# Patient Record
Sex: FEMALE | Race: WHITE | NOT HISPANIC OR LATINO | Employment: OTHER | URBAN - METROPOLITAN AREA
[De-identification: names, ages, dates, MRNs, and addresses within clinical notes are randomized per-mention and may not be internally consistent; named-entity substitution may affect disease eponyms.]

---

## 2017-01-18 ENCOUNTER — OFFICE VISIT (OUTPATIENT)
Dept: OPHTHALMOLOGY | Facility: CLINIC | Age: 67
End: 2017-01-18
Payer: COMMERCIAL

## 2017-01-18 DIAGNOSIS — Z96.1 PSEUDOPHAKIA OF BOTH EYES: ICD-10-CM

## 2017-01-18 DIAGNOSIS — D31.32 CHOROIDAL NEVUS, LEFT: ICD-10-CM

## 2017-01-18 DIAGNOSIS — H52.4 PRESBYOPIA: Primary | ICD-10-CM

## 2017-01-18 DIAGNOSIS — M35.00 SJOGREN'S SYNDROME (H): ICD-10-CM

## 2017-01-18 PROCEDURE — 92250 FUNDUS PHOTOGRAPHY W/I&R: CPT | Performed by: STUDENT IN AN ORGANIZED HEALTH CARE EDUCATION/TRAINING PROGRAM

## 2017-01-18 PROCEDURE — 92014 COMPRE OPH EXAM EST PT 1/>: CPT | Performed by: STUDENT IN AN ORGANIZED HEALTH CARE EDUCATION/TRAINING PROGRAM

## 2017-01-18 PROCEDURE — 92015 DETERMINE REFRACTIVE STATE: CPT | Performed by: STUDENT IN AN ORGANIZED HEALTH CARE EDUCATION/TRAINING PROGRAM

## 2017-01-18 ASSESSMENT — REFRACTION_MANIFEST
OS_ADD: +2.50
OD_SPHERE: -0.25
OD_ADD: +2.50
OS_CYLINDER: +1.00
OS_SPHERE: -0.50
OS_AXIS: 170
OD_CYLINDER: SPHERE

## 2017-01-18 ASSESSMENT — CONF VISUAL FIELD
OS_NORMAL: 1
OD_NORMAL: 1

## 2017-01-18 ASSESSMENT — EXTERNAL EXAM - RIGHT EYE: OD_EXAM: NORMAL

## 2017-01-18 ASSESSMENT — SLIT LAMP EXAM - LIDS
COMMENTS: NORMAL
COMMENTS: NORMAL

## 2017-01-18 ASSESSMENT — TONOMETRY
OS_IOP_MMHG: 14
IOP_METHOD: APPLANATION
OD_IOP_MMHG: 15

## 2017-01-18 ASSESSMENT — VISUAL ACUITY
OD_SC: 20/20
METHOD: SNELLEN - LINEAR
OS_SC: 20/25
OD_SC+: -2

## 2017-01-18 ASSESSMENT — EXTERNAL EXAM - LEFT EYE: OS_EXAM: NORMAL

## 2017-01-18 ASSESSMENT — CUP TO DISC RATIO
OD_RATIO: 0.25
OS_RATIO: 0.2

## 2017-01-18 NOTE — PATIENT INSTRUCTIONS
"Use artificial tears up to 4 times per day (Refresh Plus, Systane Balance, or generic artificial tears are ok. Avoid \"get the red out\" drops).   Continue Zyrtec as needed   May continue over the counter reader  Glasses prescription given - optional     Tiffanie Leroy MD  [phone]    "

## 2017-01-18 NOTE — MR AVS SNAPSHOT
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 Avoid \"get the red out\" drops).   Continue Zyrtec as needed   May continue over the counter reader  Glasses prescription given - optional     Tiffanie Leroy MD  [phone]          Follow-ups after your visit        Follow-up notes from your care team     Return in about 1 year (around 1/18/2018) for Complete Exam.      Who to contact     If you have questions or need follow up information about today's clinic visit or your schedule please contact [facility] directly at [phone].  Normal or non-critical lab and imaging results will be communicated to you by [employer], letter or phone within 4 business days after the clinic has received the results. If you do not hear from us within 7 days, please contact the clinic through [employer] or phone. If you have a critical or abnormal lab result, we will notify you by phone as soon as possible.  Submit refill requests through "[employer] or call your pharmacy and they will forward the refill request to us. Please allow 3 business days for your refill to be completed.          Additional Information About Your Visit        MyChart Information     "[employer] gives you secure access to your electronic health record. If you see a primary care provider, you can also send messages to your care team and make appointments. If you have questions, please call your primary care clinic.  If you do not have a primary care " provider, please call [phone] and they will assist you.        Care EveryWhere ID     This is your Care EveryWhere ID. This could be used by other organizations to access your [place] medical records  [id number]         Blood Pressure from Last 3 Encounters:   08/27/16 129/67   07/19/16 129/78   03/23/16 125/72    Weight from Last 3 Encounters:   08/27/16 93.26 kg (205 lb 9.6 oz)   07/19/16 92.987 kg (205 lb)   03/23/16 94.348 kg (208 lb)              Today, you had the following     No orders found for display       Primary Care Provider Office Phone # Fax #    Josee Carpenter [phone]       [facility] [address] [facility] [address]        Thank you!     Thank you for choosing [facility]  for your care. Our goal is always to provide you with excellent care. Hearing back from our patients is one way we can continue to improve our services. Please take a few minutes to complete the written survey that you may receive in the mail after your visit with us. Thank you!             Your Updated Medication List - Protect others around you: Learn how to safely use, store and throw away your medicines at www.disposemymeds.org.          This list is accurate as of: 1/18/17  3:08 PM.  Always use your most recent med list.                   Brand Name Dispense Instructions for use    ascorbic acid 500 MG tablet    VITAMIN C     1 TABLET  DAILY       BL VITAMIN B COMPLEX Tabs      sublingual       CALCIUM 600 + D PO      Take by mouth daily       carboxymethylcellulose 1 % ophthalmic solution    CELLUVISC/REFRESH LIQUIGEL     Place 1 drop into both eyes 3 times daily       cetirizine 10 MG tablet    zyrTEC     Take 10 mg by mouth daily       clobetasol 0.05 % cream    TEMOVATE     Apply topically 2 times daily       diclofenac 1 % Gel topical gel    VOLTAREN    100 g    Place onto the skin 4 times daily       fluticasone 50 MCG/ACT spray    FLONASE     Spray 2  sprays into both nostrils daily       OSTEO BI-FLEX ADV DOUBLE ST Caps      1 tablet daily       SUDAFED 30 MG tablet   Generic drug:  pseudoePHEDrine      1 TABLET EVERY 4 TO 6 HOURS AS NEEDED       TYLENOL PO      Take 325 mg by mouth       umeclidinium 62.5 MCG/INH oral inhaler    INCRUSE ELLIPTA    30 each    Inhale 1 puff (62.5 mcg) into the lungs daily       vitamin E 400 UNIT capsule      1 CAPSULE DAILY

## 2017-01-18 NOTE — PROGRESS NOTES
" Current Eye Medications:  Art. Tears TID both eyes.      Subjective:  Here for complete.  Has the marker's for Sjogren's, [facility] Does use drops every day, vision has been good.  Even reads without glasses sometimes. Zyrtec helps with the red right eye flare ups. Nevus left eye, will be photographed today.      Objective:  See Ophthalmology Exam.      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      Choroidal nevus, left Stable. Photos obtained today.       Plan:  Use artificial tears up to 4 times per day (Refresh Plus, Systane Balance, or generic artificial tears are ok. Avoid \"get the red out\" drops).   Continue Zyrtec as needed   May continue over the counter reader    Tiffanie Leroy MD  [phone]        "

## 2017-01-26 ENCOUNTER — OFFICE VISIT (OUTPATIENT)
Dept: URGENT CARE | Facility: URGENT CARE | Age: 67
End: 2017-01-26
Payer: COMMERCIAL

## 2017-01-26 VITALS
WEIGHT: 196 LBS | RESPIRATION RATE: 16 BRPM | DIASTOLIC BLOOD PRESSURE: 71 MMHG | SYSTOLIC BLOOD PRESSURE: 117 MMHG | TEMPERATURE: 99.3 F | BODY MASS INDEX: 31.16 KG/M2 | HEART RATE: 81 BPM | OXYGEN SATURATION: 97 %

## 2017-01-26 DIAGNOSIS — J01.40 ACUTE NON-RECURRENT PANSINUSITIS: Primary | ICD-10-CM

## 2017-01-26 PROCEDURE — 99214 OFFICE O/P EST MOD 30 MIN: CPT | Performed by: PHYSICIAN ASSISTANT

## 2017-01-26 NOTE — PROGRESS NOTES
SUBJECTIVE:                                                    Karis Youssef is a 66 year old female who presents to clinic today for the following health issues:      RESPIRATORY SYMPTOMS      Duration: 3 days    Description  nasal congestion, sore throat, facial pain/pressure, cough, wheezing and chills    Severity: moderate    Accompanying signs and symptoms: None    History (predisposing factors):  COPD ?    Precipitating or alleviating factors: None    Therapies tried and outcome:  oral decongestant     Grayson Youssef is a 65 yo female who presents with possible sinus infection for last 4 days.  She notes frontal sinus pressure.  She does have a cough at night, not during the day as much.  There is a sore throat, that is right sided and worse in the morning.  She has had a fever, states on Monday she had body aches, headache, chills and a temperature up to 104.  Low grade fever has persisted since Monday.  She did have some wheezing last night when she was lying down.  She has used an oral decongestant with some relief.  She has a history of chronic sinusitis as well as pneumonia-last year.  She has been around sick contacts.  No shortness of breath, vomiting, or diarrhea.  No hemoptysis.      Problem list and histories reviewed & adjusted, as indicated.  Additional history: as documented    Current Outpatient Prescriptions   Medication Sig Dispense Refill     carboxymethylcellulose (CELLUVISC/REFRESH LIQUIGEL) 1 % ophthalmic solution Place 1 drop into both eyes 3 times daily       fluticasone (FLONASE) 50 MCG/ACT nasal spray Spray 2 sprays into both nostrils daily       umeclidinium (INCRUSE ELLIPTA) 62.5 MCG/INH oral inhaler Inhale 1 puff (62.5 mcg) into the lungs daily 30 each 1     diclofenac (VOLTAREN) 1 % GEL Place onto the skin 4 times daily 100 g 6     Acetaminophen (TYLENOL PO) Take 325 mg by mouth       cetirizine (ZYRTEC) 10 MG tablet Take 10 mg by mouth daily       clobetasol (TEMOVATE) 0.05 %  cream Apply topically 2 times daily       Calcium Carbonate-Vitamin D (CALCIUM 600 + D OR) Take by mouth daily       OSTEO BI-FLEX ADV DOUBLE ST PO CAPS 1 tablet daily       VITAMIN E 400 UNIT PO CAPS 1 CAPSULE DAILY       BL VITAMIN B COMPLEX PO TABS sublingual       VITAMIN C 500 MG OR TABS 1 TABLET  DAILY       SUDAFED 30 MG OR TABS 1 TABLET EVERY 4 TO 6 HOURS AS NEEDED       Allergies   Allergen Reactions     Erythromycin Nausea     Tongue turned black.       ROS:  As per HPI    OBJECTIVE:                                                    /71 mmHg  Pulse 81  Temp(Src) 99.3  F (37.4  C) (Oral)  Resp 16  Wt 196 lb (88.905 kg)  SpO2 97%  Body mass index is 31.16 kg/(m^2).  GENERAL: healthy, alert and no distress  HENT: normal cephalic/atraumatic, ear canals and TM's normal, nose and mouth without ulcers or lesions, oropharynx clear and oral mucous membranes moist.  Bilateral inferior turbinates are mildly edematous.  There is frontal and maxillary sinus tenderness bilaterally.  Thick drainage is noted in the OP.  NECK: no adenopathy  RESP: lungs clear to auscultation - no rales, rhonchi or wheezes  CV: regular rate and rhythm, normal S1 S2  SKIN: no suspicious lesions or rashes    Diagnostic Test Results:  none      ASSESSMENT/PLAN:                                                    (J01.40) Acute non-recurrent pansinusitis  (primary encounter diagnosis)      Findings consistent with sinus infection.  With her history of chronic sinusitis and low grade fever in clinic I would like to treat and she was in agreement.  Start Augmentin 875 mg one tablet twice daily for 10 days.  Take with food and add a probiotic.  Continue with nasal saline rinses and a decongestant of choice.  Steamy showers.  Follow up with primary care to assure resolution of symptoms.  All questions were answered and she was in agreement with above plan.      Mignon Stanford PA-C  [facility]

## 2017-01-27 NOTE — NURSING NOTE
"Chief Complaint   Patient presents with     Sinus Problem     c/o sinus pressure and pain, congestion and fever x 3 days       Initial /71 mmHg  Pulse 81  Temp(Src) 99.3  F (37.4  C) (Oral)  Resp 16  Wt 196 lb (88.905 kg)  SpO2 97% Estimated body mass index is 31.16 kg/(m^2) as calculated from the following:    Height as of 7/19/16: 5' 6.5\" (1.689 m).    Weight as of this encounter: 196 lb (88.905 kg).  BP completed using cuff size: uyen Jenkins MA      "

## 2017-05-02 ENCOUNTER — TELEPHONE (OUTPATIENT)
Dept: FAMILY MEDICINE | Facility: CLINIC | Age: 67
End: 2017-05-02

## 2017-05-02 NOTE — TELEPHONE ENCOUNTER
Panel Management Review      Patient has the following on her problem list: None      Composite cancer screening  Chart review shows that this patient is due/due soon for the following Pap Smear  Summary:    Patient is due/failing the following:   PAP    Action needed:   Patient needs office visit for AFE .    Type of outreach:    Sent [employer] message.    Questions for provider review:    None                                                                                                                                    Lucy Spangler [facility]       Chart routed to Care Team .

## 2017-05-26 ENCOUNTER — HEALTH MAINTENANCE LETTER (OUTPATIENT)
Age: 67
End: 2017-05-26

## 2017-07-21 ENCOUNTER — OFFICE VISIT (OUTPATIENT)
Dept: URGENT CARE | Facility: URGENT CARE | Age: 67
End: 2017-07-21
Payer: COMMERCIAL

## 2017-07-21 ENCOUNTER — RADIANT APPOINTMENT (OUTPATIENT)
Dept: GENERAL RADIOLOGY | Facility: CLINIC | Age: 67
End: 2017-07-21
Attending: FAMILY MEDICINE
Payer: COMMERCIAL

## 2017-07-21 VITALS
WEIGHT: 203 LBS | OXYGEN SATURATION: 98 % | HEART RATE: 74 BPM | TEMPERATURE: 97.5 F | DIASTOLIC BLOOD PRESSURE: 75 MMHG | SYSTOLIC BLOOD PRESSURE: 127 MMHG | BODY MASS INDEX: 32.27 KG/M2

## 2017-07-21 DIAGNOSIS — R07.89 CHEST WALL PAIN: Primary | ICD-10-CM

## 2017-07-21 DIAGNOSIS — R07.89 CHEST WALL PAIN: ICD-10-CM

## 2017-07-21 DIAGNOSIS — S23.8XXA SPRAIN OF CHEST WALL, INITIAL ENCOUNTER: ICD-10-CM

## 2017-07-21 PROCEDURE — 71101 X-RAY EXAM UNILAT RIBS/CHEST: CPT | Mod: RT

## 2017-07-21 PROCEDURE — 99213 OFFICE O/P EST LOW 20 MIN: CPT | Performed by: FAMILY MEDICINE

## 2017-07-21 NOTE — MR AVS SNAPSHOT
After Visit Summary   7/21/2017    Karis Youssef    MRN: [id number]           Patient Information     Date Of Birth          1950        Visit Information        Provider Department      7/21/2017 5:30 PM Johnny Lanier MD [facility]        Today's Diagnoses     Chest wall pain    -  1    Sprain of chest wall, initial encounter          Care Instructions    Continue resting, icing, biofreeze and OTC analgesia.  Avoid lifting heavy weight (5 pound or more).   Follow up if symptoms persist or worsen.             Follow-ups after your visit        Who to contact     If you have questions or need follow up information about today's clinic visit or your schedule please contact [facility] [place] directly at [phone].  Normal or non-critical lab and imaging results will be communicated to you by [employer], letter or phone within 4 business days after the clinic has received the results. If you do not hear from us within 7 days, please contact the clinic through [employer] or phone. If you have a critical or abnormal lab result, we will notify you by phone as soon as possible.  Submit refill requests through [employer] or call your pharmacy and they will forward the refill request to us. Please allow 3 business days for your refill to be completed.          Additional Information About Your Visit        MyChart Information     [employer] gives you secure access to your electronic health record. If you see a primary care provider, you can also send messages to your care team and make appointments. If you have questions, please call your primary care clinic.  If you do not have a primary care provider, please call [phone] and they will assist you.        Care EveryWhere ID     This is your Care EveryWhere ID. This could be used by other organizations to access your [place] medical records  [id number]        Your Vitals Were     Pulse Temperature Pulse Oximetry BMI (Body Mass Index)           74 97.5  F (36.4  C) (Oral) 98% 32.27 kg/m2         Blood Pressure from Last 3 Encounters:   07/21/17 127/75   01/26/17 117/71   08/27/16 129/67    Weight from Last 3 Encounters:   07/21/17 203 lb (92.1 kg)   01/26/17 196 lb (88.9 kg)   08/27/16 205 lb 9.6 oz (93.3 kg)               Primary Care Provider Office Phone # Fax #    Josee Dori Carpenter [phone]       [facility] [address] [facility] [address]        Equal Access to Services     ADELINA DUQUE : Hadii aad ku hadasho Soshola, waaxda luqadaha, qaybta kaalmada adeyrisyada, otto patel. So [facility] [phone].    ATENCIÓN: Si habla español, tiene a lewis disposición servicios gratuitos de asistencia lingüística. Llame al [phone].    We comply with applicable federal civil rights laws and [place] laws. We do not discriminate on the basis of race, color, national origin, age, disability sex, sexual orientation or gender identity.            Thank you!     Thank you for choosing [facility]  for your care. Our goal is always to provide you with excellent care. Hearing back from our patients is one way we can continue to improve our services. Please take a few minutes to complete the written survey that you may receive in the mail after your visit with us. Thank you!             Your Updated Medication List - Protect others around you: Learn how to safely use, store and throw away your medicines at www.disposemymeds.org.          This list is accurate as of: 7/21/17  6:21 PM.  Always use your most recent med list.                   Brand Name Dispense Instructions for use Diagnosis    ascorbic acid 500 MG tablet    VITAMIN C     1 TABLET  DAILY    Sicca (H)       BL VITAMIN B COMPLEX Tabs      sublingual    Sicca (H)       CALCIUM 600 + D PO      Take by mouth daily        carboxymethylcellulose 1 % ophthalmic solution    CELLUVISC/REFRESH LIQUIGEL     Place 1 drop into  both eyes 3 times daily        cetirizine 10 MG tablet    zyrTEC     Take 10 mg by mouth daily        clobetasol 0.05 % cream    TEMOVATE     Apply topically 2 times daily        diclofenac 1 % Gel topical gel    VOLTAREN    100 g    Place onto the skin 4 times daily    Primary osteoarthritis of both knees       fluticasone 50 MCG/ACT spray    FLONASE     Spray 2 sprays into both nostrils daily        OSTEO BI-FLEX ADV DOUBLE ST Caps      1 tablet daily        SUDAFED 30 MG tablet   Generic drug:  pseudoePHEDrine      1 TABLET EVERY 4 TO 6 HOURS AS NEEDED    Sicca (H)       TYLENOL PO      Take 325 mg by mouth        vitamin E 400 UNIT capsule      1 CAPSULE DAILY    Sicca (H)

## 2017-07-21 NOTE — PROGRESS NOTES
SUBJECTIVE:                                                    Karis Youssef is a 67 year old female who presents to clinic today for the following health issues:      Musculoskeletal problem/pain      Duration: X 5 days    Description  Location: right side rib    Intensity:  moderate    Accompanying signs and symptoms: radiation of pain across under breast    History  Previous similar problem: no   Previous evaluation:  none    Precipitating or alleviating factors:  Trauma or overuse: YES- using chainsaw before it started, lifted a humidifier from floor to counter top (weight 40-50 pounds) as well    Aggravating factors include: movements, laying down    Therapies tried and outcome: Ibuprofen        Problem list and histories reviewed & adjusted, as indicated.  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 Mother      OSTEOPOROSIS Mother      Hypertension Mother      HEART DISEASE Mother      CAD, CHF     DIABETES Father      Hypertension Father      CANCER Brother      liver     DIABETES Brother      DIABETES Sister      Breast Cancer Sister 50     Hypertension Sister      CANCER Sister 72      non-hodgkins lymphoma     Connective Tissue Disorder Other      2 cousins and niece with MS; aunt with lupus     Rheumatoid Arthritis Cousin          Current Outpatient Prescriptions   Medication Sig Dispense Refill     Acetaminophen (TYLENOL PO) Take 325 mg by mouth       cetirizine (ZYRTEC) 10 MG tablet Take 10 mg by mouth daily       Calcium Carbonate-Vitamin D (CALCIUM 600 + D OR) Take by mouth daily       VITAMIN E 400 UNIT PO CAPS 1 CAPSULE DAILY       VITAMIN C 500 MG OR TABS 1 TABLET  DAILY       SUDAFED 30 MG OR TABS 1 TABLET EVERY 4 TO 6 HOURS AS NEEDED       carboxymethylcellulose (CELLUVISC/REFRESH LIQUIGEL) 1 % ophthalmic solution Place 1 drop into both eyes 3 times daily       fluticasone (FLONASE) 50 MCG/ACT nasal spray Spray 2 sprays into both nostrils daily       diclofenac (VOLTAREN) 1 % GEL Place onto the skin 4 times daily (Patient not taking: Reported on 7/21/2017) 100 g 6     clobetasol (TEMOVATE) 0.05 % cream Apply topically 2 times daily       OSTEO BI-FLEX ADV DOUBLE ST PO CAPS 1 tablet daily       BL VITAMIN B COMPLEX PO TABS sublingual       Allergies   Allergen Reactions     Erythromycin Nausea     Tongue turned black.     Recent Labs   Lab Test  05/28/15   0831  08/07/13   1232  08/18/10   1035  05/27/09   1037   LDL  114   --   127   --    HDL  67   --   66   --    TRIG  78   --   83   --    ALT   --    --   22   --    CR   --    --   0.79   --    GFRESTIMATED   --    --   75   --    GFRESTBLACK   --    --   >90   --    POTASSIUM   --    --   4.1   --    TSH   --   2.52   --   3.30      BP Readings from Last 3 Encounters:   07/21/17 127/75   01/26/17 117/71   08/27/16 129/67    Wt Readings from Last 3 Encounters:   07/21/17 203 lb (92.1 kg)   01/26/17 196 lb (88.9 kg)   08/27/16 205 lb 9.6 oz (93.3 kg)                  Labs reviewed in EPIC          ROS:  Constitutional, HEENT, cardiovascular, pulmonary, gi and gu systems are negative, except as otherwise noted.      OBJECTIVE:   BP  127/75  Pulse 74  Temp 97.5  F (36.4  C) (Oral)  Wt 203 lb (92.1 kg)  SpO2 98%  BMI 32.27 kg/m2  Body mass index is 32.27 kg/(m^2).  GENERAL: alert and no distress  EYES: Eyes grossly normal to inspection, PERRL and conjunctivae and sclerae normal  NECK: no adenopathy, no asymmetry, masses, or scars and thyroid normal to palpation  RESP: lungs clear to auscultation - no rales, rhonchi or wheezes  CV: right lower chest wall tenderness, no bruising or swelling noted, pain reproducible with movement, HR normal, normal S1 and S2,   ABDOMEN: soft, nontender, no hepatosplenomegaly, no masses and bowel sounds normal  MS: no gross musculoskeletal defects noted, no edema      XR RIBS & CHEST RT 3VW 7/21/2017 6:13 PM      HISTORY: right chest wall pain, Other chest pain     COMPARISON: 8/25/2016         IMPRESSION: Cardiac size is at the upper limits of normal. Thoracic  aorta is tortuous. The pulmonary vasculature is not distended. No  evidence of pneumothorax. There are no acute infiltrates. No displaced  fractures are identified on rib detail views.      ASSESSMENT/PLAN:         ICD-10-CM    1. Chest wall pain R07.89 XR Ribs & Chest Right G/E 3 Views   2. Sprain of chest wall, initial encounter S23.8XXA      Symptoms likely secondary to chest wall sprain. X-ray findings discussed. Written instructions provided. All questions answered.       Patient Instructions   Continue resting, icing and OTC analgesia.  Avoid lifting heavy weight (5 pound or more).   Follow up if symptoms persist or worsen.         Johnny Lanier MD  [facility]

## 2017-07-21 NOTE — NURSING NOTE
"Chief Complaint   Patient presents with     Rib Pain     right side, related to movement       Initial /75  Pulse 74  Temp 97.5  F (36.4  C) (Oral)  Wt 203 lb (92.1 kg)  SpO2 98%  BMI 32.27 kg/m2 Estimated body mass index is 32.27 kg/(m^2) as calculated from the following:    Height as of 7/19/16: 5' 6.5\" (1.689 m).    Weight as of this encounter: 203 lb (92.1 kg).  Medication Reconciliation: complete   Antonietta Duggan CMA      "

## 2017-07-21 NOTE — PATIENT INSTRUCTIONS
Continue resting, icing, biofreeze and OTC analgesia.  Avoid lifting heavy weight (5 pound or more).   Follow up if symptoms persist or worsen.

## 2017-07-24 ENCOUNTER — MYC MEDICAL ADVICE (OUTPATIENT)
Dept: FAMILY MEDICINE | Facility: CLINIC | Age: 67
End: 2017-07-24

## 2017-07-24 NOTE — TELEPHONE ENCOUNTER
Per protocol, will route encounter to be cosigned by provider for Verbal Orders.  Trinity Fraser RN

## 2017-08-10 ENCOUNTER — OFFICE VISIT (OUTPATIENT)
Dept: URGENT CARE | Facility: URGENT CARE | Age: 67
End: 2017-08-10
Payer: COMMERCIAL

## 2017-08-10 VITALS
TEMPERATURE: 97.4 F | DIASTOLIC BLOOD PRESSURE: 71 MMHG | OXYGEN SATURATION: 96 % | SYSTOLIC BLOOD PRESSURE: 116 MMHG | WEIGHT: 199.8 LBS | BODY MASS INDEX: 31.77 KG/M2 | HEART RATE: 78 BPM

## 2017-08-10 DIAGNOSIS — R07.0 THROAT PAIN: Primary | ICD-10-CM

## 2017-08-10 DIAGNOSIS — J01.90 ACUTE SINUSITIS WITH COEXISTING CONDITION REQUIRING PROPHYLACTIC TREATMENT: ICD-10-CM

## 2017-08-10 DIAGNOSIS — M35.00 SJOGREN'S SYNDROME, WITH UNSPECIFIED ORGAN INVOLVEMENT (H): ICD-10-CM

## 2017-08-10 LAB
DEPRECATED S PYO AG THROAT QL EIA: NORMAL
MICRO REPORT STATUS: NORMAL
SPECIMEN SOURCE: NORMAL

## 2017-08-10 PROCEDURE — 99213 OFFICE O/P EST LOW 20 MIN: CPT | Performed by: FAMILY MEDICINE

## 2017-08-10 PROCEDURE — 87880 STREP A ASSAY W/OPTIC: CPT | Performed by: FAMILY MEDICINE

## 2017-08-10 PROCEDURE — 87081 CULTURE SCREEN ONLY: CPT | Performed by: FAMILY MEDICINE

## 2017-08-10 RX ORDER — AMOXICILLIN 875 MG
875 TABLET ORAL 2 TIMES DAILY
Qty: 20 TABLET | Refills: 0 | Status: SHIPPED | OUTPATIENT
Start: 2017-08-10 | End: 2017-08-20

## 2017-08-10 NOTE — MR AVS SNAPSHOT
After Visit Summary   8/10/2017    Karis Youssef    MRN: [id number]           Patient Information     Date Of Birth          1950        Visit Information        Provider Department      8/10/2017 5:15 PM Giselle Perez MD [facility]        Today's Diagnoses     Throat pain    -  1    Sjogren's syndrome, with unspecified organ involvement (H)        Acute sinusitis with coexisting condition requiring prophylactic treatment           Follow-ups after your visit        Your next 10 appointments already scheduled     Aug 14, 2017  8:40 AM CDT   [facility] with [facility]e Carpenter [facility]   [facility]    [address] [facility] [address] [phone]              Who to contact     If you have questions or need follow up information about today's clinic visit or your schedule please contact [facility] directly at [phone].  Normal or non-critical lab and imaging results will be communicated to you by cWyzehart, letter or phone within 4 business days after the clinic has received the results. If you do not hear from us within 7 days, please contact the clinic through ZOCKOt or phone. If you have a critical or abnormal lab result, we will notify you by phone as soon as possible.  Submit refill requests through [email or web address] or call your pharmacy and they will forward the refill request to us. Please allow 3 business days for your refill to be completed.          Additional Information About Your Visit        MyChart Information     [email or web address] gives you secure access to your electronic health record. If you see a primary care provider, you can also send messages to your care team and make appointments. If you have questions, please call your primary care clinic.  If you do not have a primary care provider, please call [phone] and they will assist you.        Care EveryWhere ID     This is your Care  EveryWhere ID. This could be used by other organizations to access your [place] medical records  [id number]        Your Vitals Were     Pulse Temperature Pulse Oximetry BMI (Body Mass Index)          78 97.4  F (36.3  C) (Oral) 96% 31.77 kg/m2         Blood Pressure from Last 3 Encounters:   08/10/17 116/71   07/21/17 127/75   01/26/17 117/71    Weight from Last 3 Encounters:   08/10/17 199 lb 12.8 oz (90.6 kg)   07/21/17 203 lb (92.1 kg)   01/26/17 196 lb (88.9 kg)              We Performed the Following     Beta strep group A culture     Rapid strep screen          Today's Medication Changes          These changes are accurate as of: 8/10/17  6:25 PM.  If you have any questions, ask your nurse or doctor.               Start taking these medicines.        Dose/Directions    amoxicillin 875 MG tablet   Commonly known as:  AMOXIL   Used for:  Acute sinusitis with coexisting condition requiring prophylactic treatment        Dose:  875 mg   Take 1 tablet (875 mg) by mouth 2 times daily for 10 days   Quantity:  20 tablet   Refills:  0            Where to get your medicines      These medications were sent to [facility] [address], [facility] [address]  [id number] [place] [address]     Phone:  [phone]     amoxicillin 875 MG tablet                Primary Care Provider Office Phone # Fax #    Josee Dori Carpenter [phone] [address] [facility] [address]        Equal Access to Services     ADELINA DUQUE AH: Hadii kristy mcphersono Sobuzzali, waaxda luqadaha, qaybta kaalmada linda, otto patel. So [facility] [phone].    ATENCIÓN: Si habla español, tiene a lewis disposición servicios gratuitos de asistencia lingüística. Llame al [phone].    We comply with applicable federal civil rights laws and [place] laws. We do not discriminate on the basis of race, color, national origin, age, disability sex, sexual  orientation or gender identity.            Thank you!     [facility] you [facility]  for your [facility]. Our goal is always to provide you with excellent care. Hearing back from our patients is one way we can continue to improve our services. Please take a few minutes to complete the written survey that you may receive in the mail after your visit with us. Thank you!             Your Updated Medication List - Protect others around you: Learn how to safely use, store and throw away your medicines at www.disposemymeds.org.          This list is accurate as of: 8/10/17  6:25 PM.  Always use your most recent med list.                   Brand Name Dispense Instructions for use Diagnosis    amoxicillin 875 MG tablet    AMOXIL    20 tablet    Take 1 tablet (875 mg) by mouth 2 times daily for 10 days    Acute sinusitis with coexisting condition requiring prophylactic treatment       ascorbic acid 500 MG tablet    VITAMIN C     1 TABLET  DAILY    Sicca (H)       BL VITAMIN B COMPLEX Tabs      sublingual    Sicca (H)       CALCIUM 600 + D PO      Take by mouth daily        carboxymethylcellulose 1 % ophthalmic solution    CELLUVISC/REFRESH LIQUIGEL     Place 1 drop into both eyes 3 times daily        cetirizine 10 MG tablet    zyrTEC     Take 10 mg by mouth daily        clobetasol 0.05 % cream    TEMOVATE     Apply topically 2 times daily        diclofenac 1 % Gel topical gel    VOLTAREN    100 g    Place onto the skin 4 times daily    Primary osteoarthritis of both knees       fluticasone 50 MCG/ACT spray    FLONASE     Spray 2 sprays into both nostrils daily        OSTEO BI-FLEX ADV DOUBLE ST Caps      1 tablet daily        SUDAFED 30 MG tablet   Generic drug:  pseudoePHEDrine      1 TABLET EVERY 4 TO 6 HOURS AS NEEDED    Sicca (H)       TYLENOL PO      Take 325 mg by mouth        vitamin E 400 UNIT capsule      1 CAPSULE DAILY    Sicca (H)

## 2017-08-10 NOTE — NURSING NOTE
"Chief Complaint   Patient presents with     Pharyngitis     fatigue X 6 days       Initial /71  Pulse 78  Temp 97.4  F (36.3  C) (Oral)  Wt 199 lb 12.8 oz (90.6 kg)  SpO2 96%  BMI 31.77 kg/m2 Estimated body mass index is 31.77 kg/(m^2) as calculated from the following:    Height as of 7/19/16: 5' 6.5\" (1.689 m).    Weight as of this encounter: 199 lb 12.8 oz (90.6 kg).  Medication Reconciliation: complete   Antonietta Duggan CMA      "

## 2017-08-10 NOTE — PROGRESS NOTES
SUBJECTIVE:                                                    Karis Youssef is a 67 year old female who presents to clinic today for the following health issues:      RESPIRATORY SYMPTOMS      Duration: X 6 days    Description  nasal congestion, sore throat, fatigue/malaise and sneezing    Severity: moderate    Accompanying signs and symptoms: None    History (predisposing factors):  Does have allergies    Precipitating or alleviating factors: None    Therapies tried and outcome:  Sudafed, Zytec    Thought initially maybe her sinuses allergy    This morning got worse and then saw white spots  Colds, sinus congestion, facial pain  Cough Yes  Greenish discharge  Fever No  Progressively getting worse: YES  Thought was getting better then started getting worse: YES  Getting better:  No  Exposure to pertussis or pertussis like symptoms: No    Problem list and histories reviewed & adjusted, as indicated.  Additional history: as documented    Problem list, Medication list, Allergies, and Medical/Social/Surgical histories reviewed in EPIC and updated as appropriate.    ROS:  Constitutional, HEENT, cardiovascular, pulmonary, gi and gu systems are negative, except as otherwise noted.    OBJECTIVE:                                                    /71  Pulse 78  Temp 97.4  F (36.3  C) (Oral)  Wt 199 lb 12.8 oz (90.6 kg)  SpO2 96%  BMI 31.77 kg/m2  Body mass index is 31.77 kg/(m^2).  GENERAL: healthy, alert and no distress  EYES: Eyes grossly normal to inspection, PERRL and conjunctivae and sclerae normal  HENT: ear canals and TM's normal, nose and mouth without ulcers or lesions  Sinuses: turbinates erythematous   NECK: no adenopathy, no asymmetry, masses, or scars and thyroid normal to palpation  RESP: lungs clear to auscultation - no rales, rhonchi or wheezes   CV: regular rate and rhythm, normal S1 S2, no S3 or S4, no murmur, click or rub, no peripheral edema and peripheral pulses strong  MS: no gross  musculoskeletal defects noted, no edema  SKIN: no suspicious lesions or rashes  NEURO: Normal strength and tone, mentation intact and speech normal  PSYCH: mentation appears normal, affect normal/bright    Diagnostic Test Results:  Results for orders placed or performed in visit on 08/10/17 (from the past 24 hour(s))   Rapid strep screen   Result Value Ref Range    Specimen Description Throat     Rapid Strep A Screen       NEGATIVE: No Group A streptococcal antigen detected by immunoassay, await   culture report.      Micro Report Status FINAL 08/10/2017         ASSESSMENT/PLAN:                                                        ICD-10-CM    1. Throat pain R07.0 Rapid strep screen     Beta strep group A culture   2. Sjogren's syndrome, with unspecified organ involvement (H) M35.00    3. Acute sinusitis with coexisting condition requiring prophylactic treatment J01.90 amoxicillin (AMOXIL) 875 MG tablet     Prescribed with amoxicillin  sjogren's stable. No concerns. Dry eyes manageable. However hasn't follow up with her rheumatologist for many years.  Recommend follow up. Patient states she will  Recommend follow up with primary care provider if no relief , sooner if worse  Adverse reactions of medications discussed.  Over the counter medications discussed.   Aware to come back in if with worsening symptoms or if no relief despite treatment plan  Patient voiced understanding and had no further questions.     MD Giselle Lora MD  [facility]

## 2017-08-11 LAB
BACTERIA SPEC CULT: NORMAL
MICRO REPORT STATUS: NORMAL
SPECIMEN SOURCE: NORMAL

## 2017-08-14 ENCOUNTER — OFFICE VISIT (OUTPATIENT)
Dept: FAMILY MEDICINE | Facility: CLINIC | Age: 67
End: 2017-08-14
Payer: COMMERCIAL

## 2017-08-14 VITALS
BODY MASS INDEX: 31.23 KG/M2 | DIASTOLIC BLOOD PRESSURE: 69 MMHG | SYSTOLIC BLOOD PRESSURE: 111 MMHG | HEIGHT: 67 IN | WEIGHT: 199 LBS | HEART RATE: 74 BPM | TEMPERATURE: 97.7 F

## 2017-08-14 DIAGNOSIS — J44.9 CHRONIC OBSTRUCTIVE PULMONARY DISEASE, UNSPECIFIED COPD TYPE (H): ICD-10-CM

## 2017-08-14 DIAGNOSIS — Z12.31 ENCOUNTER FOR SCREENING MAMMOGRAM FOR BREAST CANCER: ICD-10-CM

## 2017-08-14 DIAGNOSIS — M35.00 SJOGREN'S SYNDROME, WITH UNSPECIFIED ORGAN INVOLVEMENT (H): ICD-10-CM

## 2017-08-14 DIAGNOSIS — Z12.11 SCREEN FOR COLON CANCER: ICD-10-CM

## 2017-08-14 DIAGNOSIS — Z00.00 ENCOUNTER FOR ROUTINE ADULT HEALTH EXAMINATION WITHOUT ABNORMAL FINDINGS: Primary | ICD-10-CM

## 2017-08-14 DIAGNOSIS — R60.0 BILATERAL LEG EDEMA: ICD-10-CM

## 2017-08-14 DIAGNOSIS — M85.80 OSTEOPENIA, UNSPECIFIED LOCATION: ICD-10-CM

## 2017-08-14 LAB
CHOLEST SERPL-MCNC: 197 MG/DL
FOLATE SERPL-MCNC: 20.7 NG/ML
GLUCOSE SERPL-MCNC: 99 MG/DL (ref 70–99)
HDLC SERPL-MCNC: 68 MG/DL
LDLC SERPL CALC-MCNC: 116 MG/DL
NONHDLC SERPL-MCNC: 129 MG/DL
TRIGL SERPL-MCNC: 66 MG/DL
TSH SERPL DL<=0.005 MIU/L-ACNC: 2.62 MU/L (ref 0.4–4)
VIT B12 SERPL-MCNC: 320 PG/ML (ref 193–986)

## 2017-08-14 PROCEDURE — 82746 ASSAY OF FOLIC ACID SERUM: CPT | Performed by: FAMILY MEDICINE

## 2017-08-14 PROCEDURE — 82947 ASSAY GLUCOSE BLOOD QUANT: CPT | Performed by: FAMILY MEDICINE

## 2017-08-14 PROCEDURE — 80061 LIPID PANEL: CPT | Performed by: FAMILY MEDICINE

## 2017-08-14 PROCEDURE — 36415 COLL VENOUS BLD VENIPUNCTURE: CPT | Performed by: FAMILY MEDICINE

## 2017-08-14 PROCEDURE — 82607 VITAMIN B-12: CPT | Performed by: FAMILY MEDICINE

## 2017-08-14 PROCEDURE — 99397 PER PM REEVAL EST PAT 65+ YR: CPT | Performed by: FAMILY MEDICINE

## 2017-08-14 PROCEDURE — 84443 ASSAY THYROID STIM HORMONE: CPT | Performed by: FAMILY MEDICINE

## 2017-08-14 NOTE — MR AVS SNAPSHOT
After Visit Summary   8/14/2017    Karis Youssef    MRN: [id number]           Patient Information     Date Of Birth          1950        Visit Information        Provider Department      8/14/2017 8:40 AM Josee Carpenter MD [facility]        Today's Diagnoses     Encounter for routine adult health examination without abnormal findings    -  1    Chronic obstructive pulmonary disease, unspecified COPD type (H)        Sjogren's syndrome, with unspecified organ involvement (H)        Osteopenia, unspecified location        Screen for colon cancer        Encounter for screening mammogram for breast cancer        Bilateral leg edema          Care Instructions    Please  call [phone] to schedule your mammogram and bone density testing.    Complete stool card after antibiotics are completed.           Follow-ups after your visit        Additional Services     PULMONARY MEDICINE REFERRAL       Your provider has referred you to: [facility]: [facility] [place] [phone]   [email or web address]    Please be aware that coverage of these services is subject to the terms and limitations of your health insurance plan.  Call member services at your health plan with any benefit or coverage questions.      Please bring the following with you to your appointment:    (1) Any X-Rays, CTs or MRIs which have been performed.  Contact the facility where they were done to arrange for  prior to your scheduled appointment.    (2) List of current medications   (3) This referral request   (4) Any documents/labs given to you for this referral                  Future tests that were ordered for you today     Open Future Orders        Priority Expected Expires Ordered    *MA Screening Digital Bilateral Routine  8/14/2018 8/14/2017    Fecal colorectal cancer screen (FIT) Routine 9/4/2017 11/6/2017 8/14/2017  "   DX Hip/Pelvis/Spine Routine  8/14/2018 8/14/2017            Who to contact     If you have questions or need follow up information about today's clinic visit or your schedule please contact [facility] directly at [phone].  Normal or non-critical lab and imaging results will be communicated to you by MyChart, letter or phone within 4 business days after the clinic has received the results. If you do not hear from us within 7 days, please contact the clinic through Nanosyshart or phone. If you have a critical or abnormal lab result, we will notify you by phone as soon as possible.  Submit refill requests through Bluesocket or call your pharmacy and they will forward the refill request to us. Please allow 3 business days for your refill to be completed.          Additional Information About Your Visit        NanosysharInfer Information     Bluesocket gives you secure access to your electronic health record. If you see a primary care provider, you can also send messages to your care team and make appointments. If you have questions, please call your primary care clinic.  If you do not have a primary care provider, please call [phone] and they will assist you.        Care EveryWhere ID     This is your Care EveryWhere ID. This could be used by other organizations to access your [place] medical records  [id number]        Your Vitals Were     Pulse Temperature Height BMI (Body Mass Index)          74 97.7  F (36.5  C) (Oral) 5' 6.5\" (1.689 m) 31.64 kg/m2         Blood Pressure from Last 3 Encounters:   08/14/17 111/69   08/10/17 116/71   07/21/17 127/75    Weight from Last 3 Encounters:   08/14/17 199 lb (90.3 kg)   08/10/17 199 lb 12.8 oz (90.6 kg)   07/21/17 203 lb (92.1 kg)              We Performed the Following     Folate     Glucose     Lipid panel reflex to direct LDL     PULMONARY MEDICINE REFERRAL     TSH with free T4 reflex     Vitamin B12        Primary Care Provider Office Phone # Fax #    Josee " Dori Carpenter -[phone]       [address] [facility] [address]        Equal Access to Services     ADELINA DUQUE : Hadii aad ku hadattila Juarez, jerald ameliafarrahha, sandi bharatinancy stallworth, otto nunez noemiyris villanueva laSindylanden patel. So [facility] [phone].    ATENCIÓN: Si habla español, tiene a lewis disposición servicios gratuitos de asistencia lingüística. Llame al [phone].    We comply with applicable federal civil rights laws and [place] laws. We do not discriminate on the basis of race, color, national origin, age, disability sex, sexual orientation or gender identity.            Thank you!     Thank you for choosing [facility]  for your care. Our goal is always to provide you with excellent care. Hearing back from our patients is one way we can continue to improve our services. Please take a few minutes to complete the written survey that you may receive in the mail after your visit with us. Thank you!             Your Updated Medication List - Protect others around you: Learn how to safely use, store and throw away your medicines at www.disposemymeds.org.          This list is accurate as of: 8/14/17  9:46 AM.  Always use your most recent med list.                   Brand Name Dispense Instructions for use Diagnosis    amoxicillin 875 MG tablet    AMOXIL    20 tablet    Take 1 tablet (875 mg) by mouth 2 times daily for 10 days    Acute sinusitis with coexisting condition requiring prophylactic treatment       ascorbic acid 500 MG tablet    VITAMIN C     1 TABLET  DAILY    Sicca (H)       CALCIUM 600 + D PO      Take by mouth daily        carboxymethylcellulose 1 % ophthalmic solution    CELLUVISC/REFRESH LIQUIGEL     Place 1 drop into both eyes 3 times daily        cetirizine 10 MG tablet    zyrTEC     Take 10 mg by mouth daily        clobetasol 0.05 % cream    TEMOVATE     Apply topically 2 times daily        diclofenac 1 % Gel topical gel    VOLTAREN    100 g     Place onto the skin 4 times daily    Primary osteoarthritis of both knees       KRILL OIL PO           SUDAFED 30 MG tablet   Generic drug:  pseudoePHEDrine      1 TABLET EVERY 4 TO 6 HOURS AS NEEDED    Sicca (H)       TYLENOL PO      Take 325 mg by mouth        vitamin E 400 UNIT capsule      1 CAPSULE DAILY    Sicca (H)

## 2017-08-14 NOTE — PATIENT INSTRUCTIONS
Please  call [phone] to schedule your mammogram and bone density testing.    Complete stool card after antibiotics are completed.

## 2017-08-14 NOTE — PROGRESS NOTES
SUBJECTIVE:   CC: Karis Youssef is an 67 year old woman who presents for preventive health visit.     Physical   Annual:     Getting at least 3 servings of Calcium per day::  Yes    Bi-annual eye exam::  Yes    Dental care twice a year::  Yes    Sleep apnea or symptoms of sleep apnea::  None    Diet::  Gluten-free/reduced    Frequency of exercise::  4-5 days/week    Duration of exercise::  30-45 minutes    Taking medications regularly::  Yes    Medication side effects::  Other    Additional concerns today::  YES        Follow up chest xray , RE: enlarged heart      Today's PHQ-2 Score:   PHQ-2 (  [employer]) 2017   Q1: Little interest or pleasure in doing things 0   Q2: Feeling down, depressed or hopeless 0   PHQ-2 Score 0   Q1: Little interest or pleasure in doing things -   Q2: Feeling down, depressed or hopeless -   PHQ-2 Score -       Abuse: Current or Past(Physical, Sexual or Emotional)- No  Do you feel safe in your environment - Yes    Social History   Substance Use Topics     Smoking status: Former Smoker     Quit date: 1984     Smokeless tobacco: Never Used      Comment: nonsmoking hosuehold     Alcohol use Yes      Comment: 1 glass of wine one/two times a week     The patient does not drink >3 drinks per day nor >7 drinks per week.    Reviewed orders with patient.  Reviewed health maintenance and updated orders accordingly - Yes  G 2 P 2   No LMP recorded. Patient is postmenopausal.     Fasting: Yes    Td:Tdap        Last 3 Pap Results:   PAP (no units)   Date Value   2013 NIL   2010 NIL        HPV: neg           Cholesterol:   Lab Results   Component Value Date    CHOL 197 2015     Lab Results   Component Value Date    HDL 67 2015     Lab Results   Component Value Date     2015     Lab Results   Component Value Date    TRIG 78 2015     Lab Results   Component Value Date    CHOLHDLRATIO 2.9 2015         MM/15  Dexa:   2004     Flex/colo: FIT 5/16      Seat Belt: Yes    Sunscreen use: Yes   Calcium Intake: adeq  Health Care Directive: Yes   Sexually Active: No    Current contraception: none  History of abnormal Pap smear: Yes: see pmh  Family history of colon/breast/ovarian cancer: Yes: see [facility]  Regular self breast exam: Yes  History of abnormal mammogram: Yes: f/u benign       Labs reviewed in EPIC  BP Readings from Last 3 Encounters:   08/14/17 111/69   08/10/17 116/71   07/21/17 127/75    Wt Readings from Last 3 Encounters:   08/14/17 199 lb (90.3 kg)   08/10/17 199 lb 12.8 oz (90.6 kg)   07/21/17 203 lb (92.1 kg)                  Patient Active Problem List   Diagnosis     Thigh pain     Osteopenia     Sjogren's syndrome (H)     CARDIOVASCULAR SCREENING; LDL GOAL LESS THAN 160     Advanced directives, counseling/discussion     Palpitations     Acute conjunctivitis     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     Choroidal nevus, left     Acute pain of left shoulder     Chronic obstructive pulmonary disease (H)     Chronic obstructive pulmonary disease, unspecified COPD type (H)     Past Surgical History:   Procedure Laterality Date     BIOPSY  2000    right breast lumpectomy     BREAST LUMPECTOMY, RT/LT      right breast, benign     CATARACT IOL, RT/LT      bilateral       Social History   Substance Use Topics     Smoking status: Former Smoker     Quit date: 1/1/1984     Smokeless tobacco: Never Used      Comment: nonsmoking hosuehold     Alcohol use Yes      Comment: 1 glass of wine one/two times a week     Family History   Problem Relation Age of Onset     C.A.D. Mother      OSTEOPOROSIS Mother      Hypertension Mother      HEART DISEASE Mother      CAD, CHF     Coronary Artery Disease Mother      Hyperlipidemia Mother      DIABETES Father      Hypertension Father      CANCER Brother      liver     DIABETES Brother      DIABETES Sister      Breast Cancer Sister 50     Hypertension Sister      CANCER Sister 72     non-hodgkins  lymphoma     CEREBROVASCULAR DISEASE Maternal Grandmother      DIABETES Paternal Grandfather      Connective Tissue Disorder Other      2 cousins and niece with MS; aunt with lupus     Rheumatoid Arthritis Cousin      DIABETES Niece      Hyperlipidemia Sister      Other Cancer Brother      Liver     Other Cancer Sister      non-hodgkins lymphoma     Thyroid Disease Sister      OSTEOPOROSIS Sister          Current Outpatient Prescriptions   Medication Sig Dispense Refill     KRILL OIL PO        amoxicillin (AMOXIL) 875 MG tablet Take 1 tablet (875 mg) by mouth 2 times daily for 10 days 20 tablet 0     carboxymethylcellulose (CELLUVISC/REFRESH LIQUIGEL) 1 % ophthalmic solution Place 1 drop into both eyes 3 times daily       diclofenac (VOLTAREN) 1 % GEL Place onto the skin 4 times daily 100 g 6     Acetaminophen (TYLENOL PO) Take 325 mg by mouth       cetirizine (ZYRTEC) 10 MG tablet Take 10 mg by mouth daily       clobetasol (TEMOVATE) 0.05 % cream Apply topically 2 times daily       Calcium Carbonate-Vitamin D (CALCIUM 600 + D OR) Take by mouth daily       VITAMIN E 400 UNIT PO CAPS 1 CAPSULE DAILY       VITAMIN C 500 MG OR TABS 1 TABLET  DAILY       SUDAFED 30 MG OR TABS 1 TABLET EVERY 4 TO 6 HOURS AS NEEDED             Patient over age 50, mutual decision to screen reflected in health maintenance.      Pertinent mammograms are reviewed under the imaging tab.      Reviewed and updated as needed this visit by clinical staffTobacco  Allergies  Meds         Reviewed and updated as needed this visit by Provider            ROS:  C: NEGATIVE for fever, chills, change in weight  I: NEGATIVE for worrisome rashes, moles or lesions  E: NEGATIVE for vision changes or irritation  ENT: NEGATIVE for ear, mouth and throat problems  R: NEGATIVE for significant cough or SOB  B: NEGATIVE for masses, tenderness or discharge  CV: NEGATIVE for chest pain, palpitations or peripheral edema  GI: NEGATIVE for nausea, abdominal pain,  "heartburn, or change in bowel habits  : NEGATIVE for unusual urinary or vaginal symptoms. No vaginal bleeding.  M: NEGATIVE for significant arthralgias or myalgia  N: NEGATIVE for weakness, dizziness or paresthesias  P: NEGATIVE for changes in mood or affect      OBJECTIVE:   /69  Pulse 74  Temp 97.7  F (36.5  C) (Oral)  Ht 5' 6.5\" (1.689 m)  Wt 199 lb (90.3 kg)  BMI 31.64 kg/m2  EXAM:  GENERAL APPEARANCE: healthy, alert and no distress  EYES: Eyes grossly normal to inspection, PERRL and conjunctivae and sclerae normal  HENT: ear canals and TM's normal, nose and mouth without ulcers or lesions, oropharynx clear and oral mucous membranes moist  NECK: no adenopathy, no asymmetry, masses, or scars and thyroid normal to palpation  RESP: lungs clear to auscultation - no rales, rhonchi or wheezes  BREAST: normal without masses, tenderness or nipple discharge and no palpable axillary masses or adenopathy  CV: regular rate and rhythm, normal S1 S2, no S3 or S4, no murmur, click or rub, no peripheral edema and peripheral pulses strong  ABDOMEN: soft, nontender, no hepatosplenomegaly, no masses and bowel sounds normal  MS: no musculoskeletal defects are noted and gait is age appropriate without ataxia  SKIN: no suspicious lesions or rashes  NEURO: Normal strength and tone, sensory exam grossly normal, mentation intact and speech normal  PSYCH: mentation appears normal and affect normal/bright    ASSESSMENT/PLAN:   (Z00.00) Encounter for routine adult health examination without abnormal findings  (primary encounter diagnosis)  Comment: preventive needs reviewed  Plan: Glucose, Lipid panel reflex to direct LDL        see orders in Epic.     (J44.9) Chronic obstructive pulmonary disease, unspecified COPD type (H)  Comment: unclear if truly copd, symptoms at time of testing are resolved  Plan: PULMONARY MEDICINE REFERRAL        Desires confirmation of COPD    (M35.00) Sjogren's syndrome, with unspecified organ " "involvement (H)  Comment: has all markers of Sjogren's but no biopsy to prove she has this  Plan: no symptoms  Will query rheum for need for biopsy if asymptomatic and if any treatment or lack of treatment will make long term difference    (M85.80) Osteopenia, unspecified location  Comment: due  Plan: DX Hip/Pelvis/Spine            (Z12.11) Screen for colon cancer  Comment: due  Plan: Fecal colorectal cancer screen (FIT)            (Z12.31) Encounter for screening mammogram for breast cancer  Comment: due  Plan: *MA Screening Digital Bilateral            (R60.0) Bilateral leg edema  Comment: by end of day, no using compression stockings  Plan: TSH with free T4 reflex, Vitamin B12, Folate        Check labs, pt will purchase compression stockings and let me know if wants to try low dose prn hctz      COUNSELING:  Reviewed preventive health counseling, as reflected in patient instructions  Special attention given to:        Regular exercise       Healthy diet/nutrition         reports that she quit smoking about 33 years ago. She has never used smokeless tobacco.    Estimated body mass index is 31.64 kg/(m^2) as calculated from the following:    Height as of this encounter: 5' 6.5\" (1.689 m).    Weight as of this encounter: 199 lb (90.3 kg).   Weight management plan: Discussed healthy diet and exercise guidelines and patient will follow up in 12 months in clinic to re-evaluate.    Counseling Resources:  ATP IV Guidelines  Pooled Cohorts Equation Calculator  Breast Cancer Risk Calculator  FRAX Risk Assessment  ICSI Preventive Guidelines  Dietary Guidelines for Americans, 2010  USDA's MyPlate  ASA Prophylaxis  Lung CA Screening    Josee Carpenter MD  [facility] [place]  Answers for HPI/ROS submitted by the patient on 8/11/2017   PHQ-2 Score: 0    "

## 2017-08-14 NOTE — NURSING NOTE
"Chief Complaint   Patient presents with     Physical       Initial /69  Pulse 74  Temp 97.7  F (36.5  C) (Oral)  Ht 5' 6.5\" (1.689 m)  Wt 199 lb (90.3 kg)  BMI 31.64 kg/m2 Estimated body mass index is 31.64 kg/(m^2) as calculated from the following:    Height as of this encounter: 5' 6.5\" (1.689 m).    Weight as of this encounter: 199 lb (90.3 kg).  Medication Reconciliation: complete  Lucy Spang[place] , [place]     "

## 2017-09-01 DIAGNOSIS — Z12.11 SCREEN FOR COLON CANCER: ICD-10-CM

## 2017-09-01 PROCEDURE — 82274 ASSAY TEST FOR BLOOD FECAL: CPT | Performed by: FAMILY MEDICINE

## 2017-09-03 LAB — HEMOCCULT STL QL IA: NEGATIVE

## 2017-10-02 ENCOUNTER — RADIANT APPOINTMENT (OUTPATIENT)
Dept: MAMMOGRAPHY | Facility: CLINIC | Age: 67
End: 2017-10-02
Attending: FAMILY MEDICINE
Payer: COMMERCIAL

## 2017-10-02 ENCOUNTER — RADIANT APPOINTMENT (OUTPATIENT)
Dept: BONE DENSITY | Facility: CLINIC | Age: 67
End: 2017-10-02
Attending: FAMILY MEDICINE
Payer: COMMERCIAL

## 2017-10-02 DIAGNOSIS — Z12.31 VISIT FOR SCREENING MAMMOGRAM: ICD-10-CM

## 2017-10-02 DIAGNOSIS — M85.80 OSTEOPENIA, UNSPECIFIED LOCATION: ICD-10-CM

## 2017-10-02 PROCEDURE — G0202 SCR MAMMO BI INCL CAD: HCPCS | Mod: TC

## 2017-10-02 PROCEDURE — 77085 DXA BONE DENSITY AXL VRT FX: CPT | Performed by: INTERNAL MEDICINE

## 2017-11-26 ENCOUNTER — OFFICE VISIT (OUTPATIENT)
Dept: URGENT CARE | Facility: URGENT CARE | Age: 67
End: 2017-11-26
Payer: COMMERCIAL

## 2017-11-26 VITALS
SYSTOLIC BLOOD PRESSURE: 129 MMHG | BODY MASS INDEX: 32.75 KG/M2 | OXYGEN SATURATION: 97 % | WEIGHT: 206 LBS | HEART RATE: 79 BPM | DIASTOLIC BLOOD PRESSURE: 77 MMHG | TEMPERATURE: 97 F

## 2017-11-26 DIAGNOSIS — L03.012 PARONYCHIA OF LEFT INDEX FINGER: Primary | ICD-10-CM

## 2017-11-26 PROCEDURE — 99213 OFFICE O/P EST LOW 20 MIN: CPT | Mod: 25 | Performed by: FAMILY MEDICINE

## 2017-11-26 PROCEDURE — 10060 I&D ABSCESS SIMPLE/SINGLE: CPT | Performed by: FAMILY MEDICINE

## 2017-11-26 RX ORDER — CEPHALEXIN 500 MG/1
500 CAPSULE ORAL 3 TIMES DAILY
Qty: 21 CAPSULE | Refills: 0 | Status: SHIPPED | OUTPATIENT
Start: 2017-11-26 | End: 2017-12-03

## 2017-11-26 NOTE — NURSING NOTE
"Chief Complaint   Patient presents with     Infection     Infected finger , L hand       Initial /77  Pulse 79  Temp 97  F (36.1  C) (Oral)  Wt 206 lb (93.4 kg)  SpO2 97%  BMI 32.75 kg/m2 Estimated body mass index is 32.75 kg/(m^2) as calculated from the following:    Height as of 8/14/17: 5' 6.5\" (1.689 m).    Weight as of this encounter: 206 lb (93.4 kg).  Medication Reconciliation: complete     YODIT KERR      "

## 2017-11-26 NOTE — PROGRESS NOTES
SUBJECTIVE:                                                    Karis Youssef is a 67 year old female who presents to clinic today for the following health issues:    Infected finger on the R hand.     SUBJECTIVE:  Here for possible infection left index finger.  Swollen and angry - worsening over past week.  No fever or chills.  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    Treating with essential oils.    Review of systems otherwise negative.  Past medical, family, and social history reviewed and updated in chart.    OBJECTIVE:  /77  Pulse 79  Temp 97  F (36.1  C) (Oral)  Wt 206 lb (93.4 kg)  SpO2 97%  BMI 32.75 kg/m2  Alert, pleasant, upbeat, and in no apparent discomfort.  Left hand - distal 2nd phalanx swollen and red, with a pocket of pus along nail fold  Past labs reviewed with the patient.     Discussed risks and benefits of proposed procedure - patient agreed to proceed.   #11 scalpel used to open x-incision.  Pus drained, bandaged     ASSESSMENT / PLAN:  (L03.012) Paronychia of left index finger  (primary encounter diagnosis)  Comment: after care discussed.  Likely does not need antibiotics - should resolve over today - if not, fill keflex.   Plan: cephALEXin (KEFLEX) 500 MG capsule, DRAIN SKIN         ABSCESS SIMPLE/SINGLE            Follow up as needed   S. Adelfo Huang MD    (Chart documentation completed in part with Dragon voice-recognition software.  Even though reviewed some grammatical, spelling, and word errors may remain.)

## 2017-12-13 ENCOUNTER — PRE VISIT (OUTPATIENT)
Dept: PULMONOLOGY | Facility: CLINIC | Age: 67
End: 2017-12-13

## 2017-12-14 NOTE — TELEPHONE ENCOUNTER
PULMONARY NEW PATIENT PRE-VISIT ASSESSMENT    Date Patient Contacted: 12/18/2017    Confirmed appointment times and location OR  Message left confirming appointment times and location. Requested patient return call to review medical history and outside records.      1. Patient is scheduled to see [facility] on 1/11/2018  2. Reason for visit: Chronic obstructive pulmonary disease, unspecified COPD type (H) [J44.9]   3. Referring provider Josee Carpenter MD on 08/14/17 0945    4. Has patient seen previous specialist? ???-no      5. Previous Imaging: In [facility]: Last CXR done: 8/26/2016, Last Chest CT done: ???      Outside Imaging: Location/Date/Type/Status (Requested, Received, Patient will hand carry disc, Pushed to iSite, Disc will be mailed) ???-         6.  Pulmonary Function Tests: Scheduled at  1/11/2018       Outside PFT Lab:  Location/Date/Status (Requested, Received, Patient will hand carry) ??? no         Oxygen? ???- no    Will route to care team for follow-up.    Patient Reminders Given:  Please, make sure you bring an updated list of your medications.   If you need to cancel or reschedule, please call [phone].

## 2017-12-20 ENCOUNTER — OFFICE VISIT (OUTPATIENT)
Dept: URGENT CARE | Facility: URGENT CARE | Age: 67
End: 2017-12-20
Payer: COMMERCIAL

## 2017-12-20 VITALS
WEIGHT: 206 LBS | OXYGEN SATURATION: 97 % | RESPIRATION RATE: 15 BRPM | DIASTOLIC BLOOD PRESSURE: 71 MMHG | SYSTOLIC BLOOD PRESSURE: 120 MMHG | BODY MASS INDEX: 32.75 KG/M2 | HEART RATE: 79 BPM | TEMPERATURE: 98.2 F

## 2017-12-20 DIAGNOSIS — J44.89 CHRONIC BRONCHITIS WITH COPD (CHRONIC OBSTRUCTIVE PULMONARY DISEASE) (H): Primary | ICD-10-CM

## 2017-12-20 DIAGNOSIS — H10.31 ACUTE BACTERIAL CONJUNCTIVITIS OF RIGHT EYE: ICD-10-CM

## 2017-12-20 PROCEDURE — 99214 OFFICE O/P EST MOD 30 MIN: CPT | Performed by: PHYSICIAN ASSISTANT

## 2017-12-20 RX ORDER — ALBUTEROL SULFATE 90 UG/1
2 AEROSOL, METERED RESPIRATORY (INHALATION) EVERY 4 HOURS PRN
Qty: 1 INHALER | Refills: 0 | Status: SHIPPED | OUTPATIENT
Start: 2017-12-20 | End: 2018-01-17

## 2017-12-20 RX ORDER — AZITHROMYCIN 250 MG/1
TABLET, FILM COATED ORAL
Qty: 6 TABLET | Refills: 0 | Status: SHIPPED | OUTPATIENT
Start: 2017-12-20 | End: 2018-01-11

## 2017-12-20 RX ORDER — POLYMYXIN B SULFATE AND TRIMETHOPRIM 1; 10000 MG/ML; [USP'U]/ML
1 SOLUTION OPHTHALMIC 4 TIMES DAILY
Qty: 1 BOTTLE | Refills: 0 | Status: SHIPPED | OUTPATIENT
Start: 2017-12-20 | End: 2017-12-27

## 2017-12-20 ASSESSMENT — ENCOUNTER SYMPTOMS
CONSTITUTIONAL NEGATIVE: 1
PALPITATIONS: 0
COUGH: 1
SHORTNESS OF BREATH: 1
GASTROINTESTINAL NEGATIVE: 1
CARDIOVASCULAR NEGATIVE: 1
EYE PAIN: 0
DIAPHORESIS: 0
SPUTUM PRODUCTION: 1
WHEEZING: 1
SINUS PAIN: 1
HEMOPTYSIS: 0
WEIGHT LOSS: 0
FEVER: 0

## 2017-12-20 NOTE — PROGRESS NOTES
SUBJECTIVE:                                                      HPI  Karis Youssef is a 67 year old female who presents to clinic today for the following health issues:  RESPIRATORY SYMPTOMS    Duration: 5 days    Description  Productive cough with mild shortness of breath and wheezing.  No hemoptysis.     Severity: moderate    Accompanying signs and symptoms: also reports sinus congestion/pain/pressure/HA, sore throat, myalgias and post nasal drainage.  Also has R sided pink eye along with drainage and mild discomfort.  Reports blurry vision but no injury or trauma.  No abdominal pain, n/v, constipation, diarrhea, bloody or black tarry stools.  No fever, chills or sweats.    History (predisposing factors):  COPD and sjogren's disease    Precipitating or alleviating factors: None    Therapies tried and outcome:  oral decongestant, tyelnol and ibuprofen with minimal relief      Reviewed PMH.  Patient Active Problem List   Diagnosis     Thigh pain     Osteopenia     Sjogren's syndrome (H)     CARDIOVASCULAR SCREENING; LDL GOAL LESS THAN 160     Advanced directives, counseling/discussion     Palpitations     Acute conjunctivitis     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     Choroidal nevus, left     Acute pain of left shoulder     Chronic obstructive pulmonary disease (H)     Chronic obstructive pulmonary disease, unspecified COPD type (H)     Current Outpatient Prescriptions   Medication Sig Dispense Refill     KRILL OIL PO        carboxymethylcellulose (CELLUVISC/REFRESH LIQUIGEL) 1 % ophthalmic solution Place 1 drop into both eyes 3 times daily       diclofenac (VOLTAREN) 1 % GEL Place onto the skin 4 times daily 100 g 6     Acetaminophen (TYLENOL PO) Take 325 mg by mouth       cetirizine (ZYRTEC) 10 MG tablet Take 10 mg by mouth daily       clobetasol (TEMOVATE) 0.05 % cream Apply topically 2 times daily       Calcium Carbonate-Vitamin D (CALCIUM 600 + D OR) Take by mouth daily       VITAMIN E 400 UNIT PO CAPS  1 CAPSULE DAILY       VITAMIN C 500 MG OR TABS 1 TABLET  DAILY       SUDAFED 30 MG OR TABS 1 TABLET EVERY 4 TO 6 HOURS AS NEEDED       Allergies   Allergen Reactions     Erythromycin Nausea     Tongue turned black.       Review of Systems   Constitutional: Negative.  Negative for diaphoresis, fever and weight loss.   HENT: Positive for congestion, ear pain and sinus pain.    Eyes: Negative for pain.   Respiratory: Positive for cough, sputum production, shortness of breath and wheezing. Negative for hemoptysis.    Cardiovascular: Negative.  Negative for chest pain and palpitations.   Gastrointestinal: Negative.    Skin: Negative.    Endo/Heme/Allergies: Negative for environmental allergies.   All other systems reviewed and are negative.      /71  Pulse 79  Temp 98.2  F (36.8  C) (Oral)  Resp 15  Wt 206 lb (93.4 kg)  SpO2 97%  Breastfeeding? No  BMI 32.75 kg/m2  Physical Exam   Constitutional: She is oriented to person, place, and time and well-developed, well-nourished, and in no distress. No distress.   HENT:   Head: Normocephalic and atraumatic.   Nose: Mucosal edema and rhinorrhea present. No nasal deformity or septal deviation. No epistaxis.  No foreign bodies. Right sinus exhibits no maxillary sinus tenderness and no frontal sinus tenderness. Left sinus exhibits no maxillary sinus tenderness and no frontal sinus tenderness.   Mouth/Throat: Uvula is midline and oropharynx is clear and moist. No oropharyngeal exudate or posterior oropharyngeal erythema.   TMs are intact without any erythema or bulging bilaterally.  Airway is patent.   Eyes: EOM are normal. Pupils are equal, round, and reactive to light. Right eye exhibits discharge. Right conjunctiva is injected. No scleral icterus.   Visual acuity L eye 20/30.  Visual acuity R eye 20/40.     Neck: Normal range of motion. Neck supple. No thyromegaly present.   Cardiovascular: Normal rate, regular rhythm, normal heart sounds and intact distal pulses.   Exam reveals no gallop and no friction rub.    No murmur heard.  Pulmonary/Chest: Effort normal and breath sounds normal. No accessory muscle usage. No respiratory distress. She has no decreased breath sounds. She has no wheezes. She has no rhonchi. She has no rales.   Lymphadenopathy:     She has no cervical adenopathy.   Neurological: She is alert and oriented to person, place, and time.   Skin: Skin is warm and dry. No cyanosis. Nails show no clubbing.   Psychiatric: Mood and affect normal.   Nursing note and vitals reviewed.        Assessment/Plan:  Chronic bronchitis with COPD (chronic obstructive pulmonary disease) (H):  Will treat with zithromax X5days and albuterol inh as needed for symptoms.  OTC Robitussin or Delsym prn cough.  Recommend treatment with rest, fluids and chicken soup. Tylenol/ibuprofen prn fever/pain.  Recheck in clinic if symptoms worsen or if symptoms do not improve.  To the ER if worsening cough, shortness of breath/wheezing, hemoptysis, or fevers.  -     azithromycin (ZITHROMAX) 250 MG tablet; 2 tablets the first day, then 1 tablet daily for the next 4 days  -     albuterol (PROAIR HFA/PROVENTIL HFA/VENTOLIN HFA) 108 (90 BASE) MCG/ACT Inhaler; Inhale 2 puffs into the lungs every 4 hours as needed for shortness of breath / dyspnea or wheezing    Acute bacterial conjunctivitis of right eye:  Will treat with polytrim eye drops as directed X7days.  Continue with warm compresses and frequent hand washing to prevent transmission.  Tylenol as needed for pain/fever.  Recheck in clinic if symptoms worsen or if symptoms do not improve.  -     trimethoprim-polymyxin b (POLYTRIM) ophthalmic solution; Place 1 drop into the right eye 4 times daily for 7 days          Simona Sadler PA-C

## 2017-12-20 NOTE — MR AVS SNAPSHOT
After Visit Summary   12/20/2017    Karis Youssef    MRN: [id number]           Patient Information     Date Of Birth          1950        Visit Information        Provider Department      12/20/2017 5:10 PM Simona Sadler PA-C [facility]        Today's Diagnoses     Chronic bronchitis with COPD (chronic obstructive pulmonary disease) (H)    -  1    Acute bacterial conjunctivitis of right eye           Follow-ups after your visit        Your next 10 appointments already scheduled     Jan 11, 2018 11:00 AM CST   Office Visit with PFT LAB   [facility])    [address] [place] [id number] [phone]           Bring a current list of meds and any records pertaining to this visit. For Physicals, please bring immunization records and any forms needing to be filled out. Please arrive 10 minutes early to complete paperwork.            Jan 11, 2018  1:00 PM CST   New Visit with Miguelina Green MD   [facility])    [address] [place] [address] [phone]              Who to contact     If you have questions or need follow up information about today's clinic visit or your schedule please contact [facility] directly at [phone].  Normal or non-critical lab and imaging results will be communicated to you by MyChart, letter or phone within 4 business days after the clinic has received the results. If you do not hear from us within 7 days, please contact the clinic through MyChart or phone. If you have a critical or abnormal lab result, we will notify you by phone as soon as possible.  Submit refill requests through [employer] or call your pharmacy and they will forward the refill request to us. Please allow 3 business days for your refill to be completed.          Additional Information About Your Visit        MyChart Information      [facility] gives you secure access to your electronic health record. If you see a primary care provider, you can also send messages to your care team and make appointments. If you have questions, please call your primary care clinic.  If you do not have a primary care provider, please call [phone] and they will assist you.        Care EveryWhere ID     This is your Care EveryWhere ID. This could be used by other organizations to access your [facility] medical records  [id number]        Your Vitals Were     Pulse Temperature Respirations Pulse Oximetry Breastfeeding? BMI (Body Mass Index)    79 98.2  F (36.8  C) (Oral) 15 97% No 32.75 kg/m2       Blood Pressure from Last 3 Encounters:   12/20/17 120/71   11/26/17 129/77   08/14/17 111/69    Weight from Last 3 Encounters:   12/20/17 206 lb (93.4 kg)   11/26/17 206 lb (93.4 kg)   08/14/17 199 lb (90.3 kg)              Today, you had the following     No orders found for display         Today's Medication Changes          These changes are accurate as of: 12/20/17  5:40 PM.  If you have any questions, ask your nurse or doctor.               Start taking these medicines.        Dose/Directions    albuterol 108 (90 BASE) MCG/ACT Inhaler   Commonly known as:  PROAIR HFA/PROVENTIL HFA/VENTOLIN HFA   Used for:  Chronic bronchitis with COPD (chronic obstructive pulmonary disease) (H)   Started by:  Simona Sadler PA-C        Dose:  2 puff   Inhale 2 puffs into the lungs every 4 hours as needed for shortness of breath / dyspnea or wheezing   Quantity:  1 Inhaler   Refills:  0       azithromycin 250 MG tablet   Commonly known as:  ZITHROMAX   Used for:  Chronic bronchitis with COPD (chronic obstructive pulmonary disease) (H)   Started by:  Simona Sadler PA-C        2 tablets the first day, then 1 tablet daily for the next 4 days   Quantity:  6 tablet   Refills:  0       trimethoprim-polymyxin b ophthalmic solution   Commonly known as:  POLYTRIM   Used for:  Acute bacterial  conjunctivitis of right eye   Started by:  Simona Sadler PA-C        Dose:  1 drop   Place 1 drop into the right eye 4 times daily for 7 days   Quantity:  1 Bottle   Refills:  0            Where to get your medicines      These medications were sent to [facility] [place], [place] - [address]  [address], [address], [place] [address]     Phone:  [phone]     albuterol 108 (90 BASE) MCG/ACT Inhaler    azithromycin 250 MG tablet    trimethoprim-polymyxin b ophthalmic solution                Primary Care Provider Office Phone # Fax #    Josee Dori Carpenter [phone] [address] [facility] [address]        Equal Access to Services     ADELINA DUQUE : Delphine mcphersono Soshola, waaxda luqadaha, qaybta kaalmada adeegyada, otto mayers . So [facility] [phone].    ATENCIÓN: Si habla español, tiene a lewis disposición servicios gratuitos de asistencia lingüística. [facility] [phone].    We comply with applicable federal civil rights laws and [place] laws. We do not discriminate on the basis of race, color, national origin, age, disability, sex, sexual orientation, or gender identity.            Thank you!     Thank you for choosing [facility]  for your care. Our goal is always to provide you with excellent care. Hearing back from our patients is one way we can continue to improve our services. Please take a few minutes to complete the written survey that you may receive in the mail after your visit with us. Thank you!             Your Updated Medication List - Protect others around you: Learn how to safely use, store and throw away your medicines at www.disposemymeds.org.          This list is accurate as of: 12/20/17  5:40 PM.  Always use your most recent med list.                   Brand Name Dispense Instructions for use Diagnosis    albuterol 108 (90 BASE) MCG/ACT Inhaler    PROAIR HFA/PROVENTIL  HFA/VENTOLIN HFA    1 Inhaler    Inhale 2 puffs into the lungs every 4 hours as needed for shortness of breath / dyspnea or wheezing    Chronic bronchitis with COPD (chronic obstructive pulmonary disease) (H)       ascorbic acid 500 MG tablet    VITAMIN C     1 TABLET  DAILY    Sicca (H)       azithromycin 250 MG tablet    ZITHROMAX    6 tablet    2 tablets the first day, then 1 tablet daily for the next 4 days    Chronic bronchitis with COPD (chronic obstructive pulmonary disease) (H)       CALCIUM 600 + D PO      Take by mouth daily        carboxymethylcellulose 1 % ophthalmic solution    CELLUVISC/REFRESH LIQUIGEL     Place 1 drop into both eyes 3 times daily        cetirizine 10 MG tablet    zyrTEC     Take 10 mg by mouth daily        clobetasol 0.05 % cream    TEMOVATE     Apply topically 2 times daily        diclofenac 1 % Gel topical gel    VOLTAREN    100 g    Place onto the skin 4 times daily    Primary osteoarthritis of both knees       KRILL OIL PO           SUDAFED 30 MG tablet   Generic drug:  pseudoePHEDrine      1 TABLET EVERY 4 TO 6 HOURS AS NEEDED    Sicca (H)       trimethoprim-polymyxin b ophthalmic solution    POLYTRIM    1 Bottle    Place 1 drop into the right eye 4 times daily for 7 days    Acute bacterial conjunctivitis of right eye       TYLENOL PO      Take 325 mg by mouth        vitamin E 400 UNIT capsule      1 CAPSULE DAILY    Sicca (H)

## 2017-12-20 NOTE — NURSING NOTE
"Chief Complaint   Patient presents with     Sinus Problem     c/o cough, congestion, post nasal drainage and right eye is pink and has drainage       Initial /71  Pulse 79  Temp 98.2  F (36.8  C) (Oral)  Resp 15  Wt 206 lb (93.4 kg)  SpO2 97%  Breastfeeding? No  BMI 32.75 kg/m2 Estimated body mass index is 32.75 kg/(m^2) as calculated from the following:    Height as of 8/14/17: 5' 6.5\" (1.689 m).    Weight as of this encounter: 206 lb (93.4 kg).  Medication Reconciliation: complete   Patient and/or MA were masked during rooming process  Luc Jenkins MA        "

## 2018-01-11 ENCOUNTER — OFFICE VISIT (OUTPATIENT)
Dept: PULMONOLOGY | Facility: CLINIC | Age: 68
End: 2018-01-11
Attending: FAMILY MEDICINE
Payer: COMMERCIAL

## 2018-01-11 ENCOUNTER — OFFICE VISIT (OUTPATIENT)
Dept: NURSING | Facility: CLINIC | Age: 68
End: 2018-01-11
Payer: COMMERCIAL

## 2018-01-11 VITALS
DIASTOLIC BLOOD PRESSURE: 81 MMHG | WEIGHT: 209.7 LBS | BODY MASS INDEX: 33.7 KG/M2 | SYSTOLIC BLOOD PRESSURE: 132 MMHG | OXYGEN SATURATION: 97 % | HEIGHT: 66 IN | RESPIRATION RATE: 18 BRPM | HEART RATE: 96 BPM

## 2018-01-11 DIAGNOSIS — R06.09 DYSPNEA ON EXERTION: Primary | ICD-10-CM

## 2018-01-11 DIAGNOSIS — J44.9 COPD (CHRONIC OBSTRUCTIVE PULMONARY DISEASE) (H): Primary | ICD-10-CM

## 2018-01-11 DIAGNOSIS — Z23 NEED FOR PROPHYLACTIC VACCINATION AGAINST STREPTOCOCCUS PNEUMONIAE (PNEUMOCOCCUS): ICD-10-CM

## 2018-01-11 LAB
DLCOUNC-%PRED-PRE: 110 %
DLCOUNC-PRE: 23.83 ML/MIN/MMHG
DLCOUNC-PRED: 21.6 ML/MIN/MMHG
ERV-%PRED-PRE: 69 %
ERV-PRE: 0.32 L
ERV-PRED: 0.46 L
EXPTIME-PRE: 6.57 SEC
FEF2575-%PRED-PRE: 97 %
FEF2575-PRE: 2.04 L/SEC
FEF2575-PRED: 2.1 L/SEC
FEFMAX-%PRED-PRE: 99 %
FEFMAX-PRE: 6.12 L/SEC
FEFMAX-PRED: 6.18 L/SEC
FEV1-%PRED-PRE: 85 %
FEV1-PRE: 2.1 L
FEV1FEV6-PRE: 81 %
FEV1FEV6-PRED: 80 %
FEV1FVC-PRE: 81 %
FEV1FVC-PRED: 78 %
FEV1SVC-PRE: 75 %
FEV1SVC-PRED: 73 %
FIFMAX-PRE: 3.96 L/SEC
FRCPLETH-%PRED-PRE: 92 %
FRCPLETH-PRE: 2.58 L
FRCPLETH-PRED: 2.79 L
FVC-%PRED-PRE: 82 %
FVC-PRE: 2.6 L
FVC-PRED: 3.15 L
IC-%PRED-PRE: 85 %
IC-PRE: 2.48 L
IC-PRED: 2.88 L
RVPLETH-%PRED-PRE: 109 %
RVPLETH-PRE: 2.26 L
RVPLETH-PRED: 2.06 L
TLCPLETH-%PRED-PRE: 97 %
TLCPLETH-PRE: 5.05 L
TLCPLETH-PRED: 5.19 L
VA-%PRED-PRE: 84 %
VA-PRE: 4.49 L
VC-%PRED-PRE: 83 %
VC-PRE: 2.79 L
VC-PRED: 3.34 L

## 2018-01-11 PROCEDURE — 94729 DIFFUSING CAPACITY: CPT | Performed by: INTERNAL MEDICINE

## 2018-01-11 PROCEDURE — G0009 ADMIN PNEUMOCOCCAL VACCINE: HCPCS | Performed by: INTERNAL MEDICINE

## 2018-01-11 PROCEDURE — 99207 ZZC DROP WITH A PROCEDURE: CPT | Performed by: INTERNAL MEDICINE

## 2018-01-11 PROCEDURE — 94726 PLETHYSMOGRAPHY LUNG VOLUMES: CPT | Performed by: INTERNAL MEDICINE

## 2018-01-11 PROCEDURE — 94375 RESPIRATORY FLOW VOLUME LOOP: CPT | Performed by: INTERNAL MEDICINE

## 2018-01-11 PROCEDURE — 90670 PCV13 VACCINE IM: CPT | Performed by: INTERNAL MEDICINE

## 2018-01-11 PROCEDURE — 99204 OFFICE O/P NEW MOD 45 MIN: CPT | Mod: 25 | Performed by: INTERNAL MEDICINE

## 2018-01-11 NOTE — PATIENT INSTRUCTIONS
It was nice to meet you today.    You do not have COPD. I removed it from your problem list and your past medical history.    Prevnar 13 for vaccination against pneumonia; you should get a pneumovax 23 somewhere between 8 weeks and 1  Year from now.    Come back and see me again if you feel like your breathing is changing.     Please use a neti pot for your sinuses.    Miguelina Green

## 2018-01-11 NOTE — LETTER
1/11/2018         RE: Karis Youssef  [address]  [place] [address]        Dear Colleague,    Thank you for referring your patient, Karis Youssef, to the [facility]. Please see a copy of my visit note below.    CHIEF COMPLAINT:  Intermittent shortness of breath.      REFERRING PROVIDER:  Josee Carpenter MD.      HISTORY OF PRESENT ILLNESS:  Karis Youssef is a 67-year-old woman here for evaluation.  She says that she is here essentially to talk to a pulmonologist about whether or not she has diagnosable lung disease.  She says that she was diagnosed with pneumonia in Urgent Care and, while she was being seen there, she was asked if she had any history of COPD.  She went to primary care office and underwent spirometry using a device hooked up to the computer at the office.  She says that she was told that she had COPD at that time.  She was prescribed an inhaler, but really was not taking it.  At another Urgent Care visit (she thinks for a spider bite), while she was being checked in a provider at the clinic was going through her medical records.  The provider looked at her spirometry and told her that she did not think that the patient had COPD, but that the patient should think about confirming the diagnosis.  She says that she does have occasional shortness of breath.  Sometimes when she is climbing up stairs or walking up a steep incline, she will feel short of breath.  Sometimes she has a hard time when she is rushing into work, although she does note that she has to walk uphill to get to her job site.  She says that she has had sinus problems since her childhood.  She always has sinus congestion.  She has used a Neti pot occasionally, but does not use it consistently.  She has been a little bit concerned about her breathing because, in addition to a diagnosis of pneumonia, she has received antibiotics for 2 diagnoses of bronchitis in the last year.        She reports  having Sjogren's markers in her blood as well as dry mouth and dry eyes.  She saw a rheumatologist for a couple of years, and they followed along with her symptoms, but they did not really change and she has not been on any immunomodulatory medications.  She does have lower extremity edema, especially at night.  When she wakes up in the morning, the edema is gone.  She does report that she has to pee several times during the night and says that she thinks that she is just diuresing very well during the night.  She reports a previous episode of hemoptysis when she was in her 20s.  She says at that time she was pregnant with her daughter.  She was smoking, had a chronic cough and had a couple of episodes where she had blood-streaked sputum.  She had a sputum sample sent for AFB, which came back negative.  She also had a chest x-ray done when she moved from [place] to [place], where she was working at [employer] for TB screening.  She was told that there were some small spots.  She went to her primary care provider, who suggested that the spots were encapsulated areas of a previous fungal infection and were not anything to be worried about.  She has had several chest x-rays since then and has never again been told about findings of small granulomas.      PAST MEDICAL HISTORY:   1.  Osteopenia.   2.  Arthritis in the knees and feet.      FAMILY HISTORY:  She has a niece with lupus.  A maternal aunt also had lupus.  Her mother had coronary artery disease, osteoporosis, hyperlipidemia and COPD.  Father had diabetes, hypertension and COPD.  She has 1 sister with osteoporosis.  Another sister had diabetes, breast cancer and non-Hodgkin lymphoma.  She had 1 brother with liver cancer who is .  She has 2 cousins with MS.      SOCIAL HISTORY:  She is a former smoker with an approximately a 15-pack-year smoking history.  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      REVIEW OF SYSTEMS:  A full  14-point review of systems was done and is negative except as noted above in the HPI.      PHYSICAL EXAMINATION:   VITAL SIGNS:  Blood pressure is 132/81, pulse is 96, respiratory rate is 18 and SpO2 is 97% on room air.  BMI is 34.37.   GENERAL APPEARANCE:  She appears her stated age.  She is in no distress.   EYES:  PERRL.  No conjunctival injection.   HEENT:  Nasal mucosa is within normal limits.  Small nasal aperture bilaterally.   MOUTH:  Oral mucosa is moist.  No posterior oropharyngeal erythema or exudate.   NECK:  Supple with no masses.   LYMPHATICS:  No cervical or supraclavicular lymphadenopathy.   RESPIRATORY:  Good air movement bilaterally.  No wheezes, rales or rhonchi.   CARDIOVASCULAR:  Regular rate and rhythm.  Normal S1 and S2.  No murmurs, rubs or gallops.  JVP is not appreciated with the patient sitting upright at 90 degrees.  No lower extremity edema is noted.   MUSCULOSKELETAL:  No clubbing or cyanosis.   SKIN:  No rash on limited exam.   NEUROLOGIC:  Mentation appears intact, and speech is fluid.   PSYCHIATRIC:  Affect appears appropriate.      RESULTS:  Pulmonary function testing from today shows normal spirometry, lung volumes and DLCO.      IMAGING:  Chest x-ray was reviewed with the patient in the clinic.  No acute cardiopulmonary abnormality.      LABORATORY DATA:  Most recent TSH from 08/2017 was 2.62.      ASSESSMENT/PLAN:  The patient is a 67-year-old woman here for evaluation.       1.  Pulmonary function test evaluation.  I did review and interpret her pulmonary function testing in the clinic today.  She does not have evidence of COPD.  Previous spirometry done in the office setting did show a low FVC and FEV1, but a normal ratio.  This pattern is more restrictive than obstructive.  However, I suspect that this test reflects normal lung function, as her lung volumes are within normal limits.  Although she has a previous tobacco use history, she does not have evidence of COPD.  I  removed COPD from her problem list as well as her past medical history.     2.  Dyspnea on exertion.  The patient is reporting mild dyspnea on exertion, including shortness of breath with climbing up hills and stairs.  I suspect that there is a component of deconditioning.  She had a previous cardiac workup that showed a normal echo.  Pulmonary function testing, as noted above, is normal.  TSH is within normal limits.  I encouraged her to continue to be active.     3.  Recent episodes of bronchitis and pneumonia.  No clear underlying etiology for slightly increased frequency of bronchitis and pneumonia.  If she should continue to have recurrent illnesses, it might be reasonable to do initial workup, including a CBC with differential and consideration of immunoglobulins (IgG with subclass panel, IgM, IgA, etc.) for evaluation.  Overall, I suspect that this represents normal course of events for her and would not expect any immunodeficiency at this time.     3.  Vaccination.  She has not had either the 13-valent or the 23-valent pneumococcal vaccinations.  I provided her with a 13-valent in the clinic today, and she will follow up to get the 23-valent in the future.      She can come back and see me again in the future if needed.         MIGUELINA LEONE MD             D: 2018 13:53   T: 2018 14:57   MT: [id number]      Name:     MARY COREAS   MRN:      [id number]        Account:      [id number]   :      1950           Visit Date:   2018      Document: [id number]       cc: Josee Carpenter MD       Again, thank you for allowing me to participate in the care of your patient.        Sincerely,        Miguelina Leone MD

## 2018-01-11 NOTE — NURSING NOTE
"Karis MAHIN Youssef's goals for this visit include: COPD  She requests these members of her care team be copied on today's visit information: yes    PCP: Josee Carpenter    Referring Provider:  Josee Carpenter MD  [address] [facility] [place] [address]    Chief Complaint   Patient presents with     Consult       Initial /81 (BP Location: Left arm, Patient Position: Chair, Cuff Size: Adult Large)  Pulse 96  Resp 18  Ht 1.664 m (5' 5.5\")  Wt 95.1 kg (209 lb 11.2 oz)  SpO2 97%  BMI 34.37 kg/m2 Estimated body mass index is 34.37 kg/(m^2) as calculated from the following:    Height as of this encounter: 1.664 m (5' 5.5\").    Weight as of this encounter: 95.1 kg (209 lb 11.2 oz).  Medication Reconciliation: complete    Do you need any medication refills at today's visit? no    "

## 2018-01-11 NOTE — MR AVS SNAPSHOT
After Visit Summary   1/11/2018    Karis Youssef    MRN: [id number]           Patient Information     Date Of Birth          1950        Visit Information        Provider Department      1/11/2018 1:00 PM Miguelina Green MD [facility]        Today's Diagnoses     Dyspnea on exertion    -  1    Need for prophylactic vaccination against Streptococcus pneumoniae (pneumococcus)          Care Instructions    It was nice to meet you today.    You do not have COPD. I removed it from your problem list and your past medical history.    Prevnar 13 for vaccination against pneumonia; you should get a pneumovax 23 somewhere between 8 weeks and 1  Year from now.    Come back and see me again if you feel like your breathing is changing.     Please use a neti pot for your sinuses.    Miguelina Green           Follow-ups after your visit        Who to contact     If you have questions or need follow up information about today's clinic visit or your schedule please contact [facility] directly at [phone].  Normal or non-critical lab and imaging results will be communicated to you by [employer], letter or phone within 4 business days after the clinic has received the results. If you do not hear from us within 7 days, please contact the clinic through [employer] or phone. If you have a critical or abnormal lab result, we will notify you by phone as soon as possible.  Submit refill requests through [employer] or call your pharmacy and they will forward the refill request to us. Please allow 3 business days for your refill to be completed.          Additional Information About Your Visit        [employer] Information     [employer] gives you secure access to your electronic health record. If you see a primary care provider, you can also send messages to your care team and make appointments. If you have questions, please call your primary care clinic.  If you do not  "have a primary care provider, please call [phone] and they will assist you.      [employer] is an electronic gateway that provides easy, online access to your medical records. With [employer], you can request a clinic appointment, read your test results, renew a prescription or communicate with your care team.     To access your existing account, please contact your [facility] or call [phone] for assistance.        Care EveryWhere ID     This is your Care EveryWhere ID. This could be used by other organizations to access your [facility] medical records  [id number]        Your Vitals Were     Pulse Respirations Height Pulse Oximetry BMI (Body Mass Index)       96 18 1.664 m (5' 5.5\") 97% 34.37 kg/m2        Blood Pressure from Last 3 Encounters:   01/11/18 132/81   12/20/17 120/71   11/26/17 129/77    Weight from Last 3 Encounters:   01/11/18 95.1 kg (209 lb 11.2 oz)   12/20/17 93.4 kg (206 lb)   11/26/17 93.4 kg (206 lb)              We Performed the Following     ADMIN MEDICARE: Pneumococcal Vaccine ()     ADMIN: Vaccine, Initial (02446)     Pneumococcal vaccine 13 valent PCV13 IM (Prevnar) [30020]        Primary Care Provider Office Phone # Fax #    Josee Dori Carpenter [phone] [address] [address] [facility] [address]        Equal Access to Services     ADELINA DUQUE AH: Hadii aad ku hadasho Soomaali, waaxda luqadaha, qaybta kaalmada adeegyada, otto patel. So [facility] [phone].    ATENCIÓN: Si habla español, tiene a lewis disposición servicios gratuitos de asistencia lingüística. Llame al [phone].    We comply with applicable federal civil rights laws and [place] laws. We do not discriminate on the basis of race, color, national origin, age, disability, sex, sexual orientation, or gender identity.            Thank you!     Thank you for choosing [facility]  for your care. Our goal is always to provide " you with excellent care. Hearing back from our patients is one way we can continue to improve our services. Please take a few minutes to complete the written survey that you may receive in the mail after your visit with us. Thank you!             Your Updated Medication List - Protect others around you: Learn how to safely use, store and throw away your medicines at www.disposemymeds.org.          This list is accurate as of: 1/11/18  1:45 PM.  Always use your most recent med list.                   Brand Name Dispense Instructions for use Diagnosis    albuterol 108 (90 BASE) MCG/ACT Inhaler    PROAIR HFA/PROVENTIL HFA/VENTOLIN HFA    1 Inhaler    Inhale 2 puffs into the lungs every 4 hours as needed for shortness of breath / dyspnea or wheezing    Chronic bronchitis with COPD (chronic obstructive pulmonary disease) (H)       ascorbic acid 500 MG tablet    VITAMIN C     1 TABLET  DAILY    Sicca (H)       CALCIUM 600 + D PO      Take by mouth daily        carboxymethylcellulose 1 % ophthalmic solution    CELLUVISC/REFRESH LIQUIGEL     Place 1 drop into both eyes 3 times daily        cetirizine 10 MG tablet    zyrTEC     Take 10 mg by mouth daily        diclofenac 1 % Gel topical gel    VOLTAREN    100 g    Place onto the skin 4 times daily    Primary osteoarthritis of both knees       SUDAFED 30 MG tablet   Generic drug:  pseudoePHEDrine      1 TABLET EVERY 4 TO 6 HOURS AS NEEDED    Sicca (H)       TYLENOL PO      Take 325 mg by mouth        vitamin E 400 UNIT capsule      1 CAPSULE DAILY    Sicca (H)

## 2018-01-11 NOTE — PROGRESS NOTES
CHIEF COMPLAINT:  Intermittent shortness of breath.      REFERRING PROVIDER:  Josee Carpenter MD.      HISTORY OF PRESENT ILLNESS:  Karis Youssef is a 67-year-old woman here for evaluation.  She says that she is here essentially to talk to a pulmonologist about whether or not she has diagnosable lung disease.  She says that she was diagnosed with pneumonia in Urgent Care and, while she was being seen there, she was asked if she had any history of COPD.  She went to primary care office and underwent spirometry using a device hooked up to the computer at the office.  She says that she was told that she had COPD at that time.  She was prescribed an inhaler, but really was not taking it.  At another Urgent Care visit (she thinks for a spider bite), while she was being checked in a provider at the clinic was going through her medical records.  The provider looked at her spirometry and told her that she did not think that the patient had COPD, but that the patient should think about confirming the diagnosis.  She says that she does have occasional shortness of breath.  Sometimes when she is climbing up stairs or walking up a steep incline, she will feel short of breath.  Sometimes she has a hard time when she is rushing into work, although she does note that she has to walk uphill to get to her job site.  She says that she has had sinus problems since her childhood.  She always has sinus congestion.  She has used a Neti pot occasionally, but does not use it consistently.  She has been a little bit concerned about her breathing because, in addition to a diagnosis of pneumonia, she has received antibiotics for 2 diagnoses of bronchitis in the last year.        She reports having Sjogren's markers in her blood as well as dry mouth and dry eyes.  She saw a rheumatologist for a couple of years, and they followed along with her symptoms, but they did not really change and she has not been on any immunomodulatory medications.  She  does have lower extremity edema, especially at night.  When she wakes up in the morning, the edema is gone.  She does report that she has to pee several times during the night and says that she thinks that she is just diuresing very well during the night.  She reports a previous episode of hemoptysis when she was in her 20s.  She says at that time she was pregnant with her daughter.  She was smoking, had a chronic cough and had a couple of episodes where she had blood-streaked sputum.  She had a sputum sample sent for AFB, which came back negative.  She also had a chest x-ray done when she moved from [place] to [place], where she was working at [employer] for TB screening.  She was told that there were some small spots.  She went to her primary care provider, who suggested that the spots were encapsulated areas of a previous fungal infection and were not anything to be worried about.  She has had several chest x-rays since then and has never again been told about findings of small granulomas.      PAST MEDICAL HISTORY:   1.  Osteopenia.   2.  Arthritis in the knees and feet.      FAMILY HISTORY:  She has a niece with lupus.  A maternal aunt also had lupus.  Her mother had coronary artery disease, osteoporosis, hyperlipidemia and COPD.  Father had diabetes, hypertension and COPD.  She has 1 sister with osteoporosis.  Another sister had diabetes, breast cancer and non-Hodgkin lymphoma.  She had 1 brother with liver cancer who is .  She has 2 cousins with MS.      SOCIAL HISTORY:  She is a former smoker with an approximately a 15-pack-year smoking history.  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      REVIEW OF SYSTEMS:  A full 14-point review of systems was done and is negative except as noted above in the HPI.      PHYSICAL EXAMINATION:   VITAL SIGNS:  Blood pressure is 132/81, pulse is 96, respiratory rate is 18 and SpO2 is 97% on room air.  BMI is 34.37.   GENERAL APPEARANCE:   She appears her stated age.  She is in no distress.   EYES:  PERRL.  No conjunctival injection.   HEENT:  Nasal mucosa is within normal limits.  Small nasal aperture bilaterally.   MOUTH:  Oral mucosa is moist.  No posterior oropharyngeal erythema or exudate.   NECK:  Supple with no masses.   LYMPHATICS:  No cervical or supraclavicular lymphadenopathy.   RESPIRATORY:  Good air movement bilaterally.  No wheezes, rales or rhonchi.   CARDIOVASCULAR:  Regular rate and rhythm.  Normal S1 and S2.  No murmurs, rubs or gallops.  JVP is not appreciated with the patient sitting upright at 90 degrees.  No lower extremity edema is noted.   MUSCULOSKELETAL:  No clubbing or cyanosis.   SKIN:  No rash on limited exam.   NEUROLOGIC:  Mentation appears intact, and speech is fluid.   PSYCHIATRIC:  Affect appears appropriate.      RESULTS:  Pulmonary function testing from today shows normal spirometry, lung volumes and DLCO.      IMAGING:  Chest x-ray was reviewed with the patient in the clinic.  No acute cardiopulmonary abnormality.      LABORATORY DATA:  Most recent TSH from 08/2017 was 2.62.      ASSESSMENT/PLAN:  The patient is a 67-year-old woman here for evaluation.       1.  Pulmonary function test evaluation.  I did review and interpret her pulmonary function testing in the clinic today.  She does not have evidence of COPD.  Previous spirometry done in the office setting did show a low FVC and FEV1, but a normal ratio.  This pattern is more restrictive than obstructive.  However, I suspect that this test reflects normal lung function, as her lung volumes are within normal limits.  Although she has a previous tobacco use history, she does not have evidence of COPD.  I removed COPD from her problem list as well as her past medical history.     2.  Dyspnea on exertion.  The patient is reporting mild dyspnea on exertion, including shortness of breath with climbing up hills and stairs.  I suspect that there is a component of  deconditioning.  She had a previous cardiac workup that showed a normal echo.  Pulmonary function testing, as noted above, is normal.  TSH is within normal limits.  I encouraged her to continue to be active.     3.  Recent episodes of bronchitis and pneumonia.  No clear underlying etiology for slightly increased frequency of bronchitis and pneumonia.  If she should continue to have recurrent illnesses, it might be reasonable to do initial workup, including a CBC with differential and consideration of immunoglobulins (IgG with subclass panel, IgM, IgA, etc.) for evaluation.  Overall, I suspect that this represents normal course of events for her and would not expect any immunodeficiency at this time.     3.  Vaccination.  She has not had either the 13-valent or the 23-valent pneumococcal vaccinations.  I provided her with a 13-valent in the clinic today, and she will follow up to get the 23-valent in the future.      She can come back and see me again in the future if needed.         NIMO LEONE MD             D: 2018 13:53   T: 2018 14:57   MT: [id number]      Name:     MARY COREAS   MRN:      [id number]        Account:      [id number]   :      1950           Visit Date:   2018      Document: [id number]       cc: Josee Carpenter MD

## 2018-01-11 NOTE — MR AVS SNAPSHOT
After Visit Summary   1/11/2018    Karis Youssef    MRN: [id number]           Patient Information     Date Of Birth          1950        Visit Information        Provider Department      1/11/2018 11:00 AM PFT LAB [facility]        Today's Diagnoses     COPD (chronic obstructive pulmonary disease) (H)    -  1       Follow-ups after your visit        Your next 10 appointments already scheduled     Jan 11, 2018  1:00 PM CST   New Visit with Miguelina Green MD   [facility]    [id number] [place] [address] [id number] [phone]              Who to contact     If you have questions or need follow up information about today's clinic visit or your schedule please contact [facility] directly at [phone].  Normal or non-critical lab and imaging results will be communicated to you by [employer], letter or phone within 4 business days after the clinic has received the results. If you do not hear from us within 7 days, please contact the clinic through [employer] or phone. If you have a critical or abnormal lab result, we will notify you by phone as soon as possible.  Submit refill requests through [email or web address] or call your pharmacy and they will forward the refill request to us. Please allow 3 business days for your refill to be completed.          Additional Information About Your Visit        MyChart Information     [email or web address] gives you secure access to your electronic health record. If you see a primary care provider, you can also send messages to your care team and make appointments. If you have questions, please call your primary care clinic.  If you do not have a primary care provider, please call [phone] and they will assist you.      [email or web address] is an electronic gateway that provides easy, online access to your medical records. With [email or web address], you can request a clinic appointment, read your test results,  renew a prescription or communicate with your care team.     To access your existing account, please contact your [facility] or call [phone] for assistance.        Care EveryWhere ID     This is your Care EveryWhere ID. This could be used by other organizations to access your [place] medical records  [id number]         Blood Pressure from Last 3 Encounters:   12/20/17 120/71   11/26/17 129/77   08/14/17 111/69    Weight from Last 3 Encounters:   12/20/17 93.4 kg (206 lb)   11/26/17 93.4 kg (206 lb)   08/14/17 90.3 kg (199 lb)              We Performed the Following     General PFT Lab (Please always keep checked)     HC DIFFUSING CAPACITY        Primary Care Provider Office Phone # Fax #    Josee Carpenter [phone] [address] [facility] [address]        Equal Access to Services     ADELINA DUQUE : Hadii aad ku hadasho Soomaali, waaxda luqadaha, qaybta kaalmada adeegyada, waxay idiin hayaan oneida kharadelbert mayesr . So [facility] [phone].    ATENCIÓN: Si habla español, tiene a lewis disposición servicios gratuitos de asistencia lingüística. Jermain al [phone].    We comply with applicable federal civil rights laws and [place] laws. We do not discriminate on the basis of race, color, national origin, age, disability, sex, sexual orientation, or gender identity.            Thank you!     Thank you for choosing [facility]  for your care. Our goal is always to provide you with excellent care. Hearing back from our patients is one way we can continue to improve our services. Please take a few minutes to complete the written survey that you may receive in the mail after your visit with us. Thank you!             Your Updated Medication List - Protect others around you: Learn how to safely use, store and throw away your medicines at www.disposemymeds.org.          This list is accurate as of: 1/11/18 11:45 AM.  Always use your most  recent med list.                   Brand Name Dispense Instructions for use Diagnosis    albuterol 108 (90 BASE) MCG/ACT Inhaler    PROAIR HFA/PROVENTIL HFA/VENTOLIN HFA    1 Inhaler    Inhale 2 puffs into the lungs every 4 hours as needed for shortness of breath / dyspnea or wheezing    Chronic bronchitis with COPD (chronic obstructive pulmonary disease) (H)       ascorbic acid 500 MG tablet    VITAMIN C     1 TABLET  DAILY    Sicca (H)       azithromycin 250 MG tablet    ZITHROMAX    6 tablet    2 tablets the first day, then 1 tablet daily for the next 4 days    Chronic bronchitis with COPD (chronic obstructive pulmonary disease) (H)       CALCIUM 600 + D PO      Take by mouth daily        carboxymethylcellulose 1 % ophthalmic solution    CELLUVISC/REFRESH LIQUIGEL     Place 1 drop into both eyes 3 times daily        cetirizine 10 MG tablet    zyrTEC     Take 10 mg by mouth daily        clobetasol 0.05 % cream    TEMOVATE     Apply topically 2 times daily        diclofenac 1 % Gel topical gel    VOLTAREN    100 g    Place onto the skin 4 times daily    Primary osteoarthritis of both knees       KRILL OIL PO           SUDAFED 30 MG tablet   Generic drug:  pseudoePHEDrine      1 TABLET EVERY 4 TO 6 HOURS AS NEEDED    Sicca (H)       TYLENOL PO      Take 325 mg by mouth        vitamin E 400 UNIT capsule      1 CAPSULE DAILY    Sicca (H)

## 2018-01-14 ENCOUNTER — TRANSFERRED RECORDS (OUTPATIENT)
Dept: HEALTH INFORMATION MANAGEMENT | Facility: CLINIC | Age: 68
End: 2018-01-14

## 2018-01-14 ENCOUNTER — NURSE TRIAGE (OUTPATIENT)
Dept: NURSING | Facility: CLINIC | Age: 68
End: 2018-01-14

## 2018-01-14 NOTE — TELEPHONE ENCOUNTER
"Patient slipped on the ice and landed on the right wrist, 45 minutes ago.  Can move her fingers, is not able to move her wrist normally.   Still has her mitten on from when she fell outside. \"I feel like that's giving me more stability.\"   Has ice on the area.   Denies any numbness in the fingers.   Notes a \"large lump\" on the top of her wrist.     Protocol and care advice reviewed.   Patient states understanding of the recommended disposition and is going to go to [facility] for eval.   Advised to call back if further questions or concerns.     Reason for Disposition    Looks like a dislocated joint (e.g., crooked or deformed)    Additional Information    Negative: Serious injury with multiple fractures    Negative: [1] Major bleeding (e.g., actively dripping or spurting) AND [2] can't be stopped    Negative: Amputation    Negative: Sounds like a life-threatening emergency to the triager    Protocols used: HAND AND WRIST INJURY-ADULT-    "

## 2018-01-15 ENCOUNTER — TRANSFERRED RECORDS (OUTPATIENT)
Dept: HEALTH INFORMATION MANAGEMENT | Facility: CLINIC | Age: 68
End: 2018-01-15

## 2018-01-17 ENCOUNTER — OFFICE VISIT (OUTPATIENT)
Dept: FAMILY MEDICINE | Facility: CLINIC | Age: 68
End: 2018-01-17
Payer: COMMERCIAL

## 2018-01-17 VITALS
HEIGHT: 66 IN | TEMPERATURE: 97.2 F | HEART RATE: 83 BPM | SYSTOLIC BLOOD PRESSURE: 129 MMHG | DIASTOLIC BLOOD PRESSURE: 69 MMHG | WEIGHT: 206 LBS | OXYGEN SATURATION: 97 % | BODY MASS INDEX: 33.11 KG/M2

## 2018-01-17 DIAGNOSIS — M17.0 PRIMARY OSTEOARTHRITIS OF BOTH KNEES: ICD-10-CM

## 2018-01-17 DIAGNOSIS — Z01.818 PREOP GENERAL PHYSICAL EXAM: Primary | ICD-10-CM

## 2018-01-17 LAB
ERYTHROCYTE [DISTWIDTH] IN BLOOD BY AUTOMATED COUNT: 13.7 % (ref 10–15)
HCT VFR BLD AUTO: 38.4 % (ref 35–47)
HGB BLD-MCNC: 12 G/DL (ref 11.7–15.7)
MCH RBC QN AUTO: 28.8 PG (ref 26.5–33)
MCHC RBC AUTO-ENTMCNC: 31.3 G/DL (ref 31.5–36.5)
MCV RBC AUTO: 92 FL (ref 78–100)
PLATELET # BLD AUTO: 288 10E9/L (ref 150–450)
RBC # BLD AUTO: 4.17 10E12/L (ref 3.8–5.2)
WBC # BLD AUTO: 7.9 10E9/L (ref 4–11)

## 2018-01-17 PROCEDURE — 99214 OFFICE O/P EST MOD 30 MIN: CPT | Mod: 25 | Performed by: INTERNAL MEDICINE

## 2018-01-17 PROCEDURE — 85027 COMPLETE CBC AUTOMATED: CPT | Performed by: INTERNAL MEDICINE

## 2018-01-17 PROCEDURE — 36415 COLL VENOUS BLD VENIPUNCTURE: CPT | Performed by: INTERNAL MEDICINE

## 2018-01-17 PROCEDURE — 93005 ELECTROCARDIOGRAM TRACING: CPT | Performed by: INTERNAL MEDICINE

## 2018-01-17 NOTE — PROGRESS NOTES
[facility]  [address] [employer] [address]  [phone]  Dept: [phone]    PRE-OP EVALUATION:  Today's date: 2018    Karis Youssef (: 1950) presents for pre-operative evaluation assessment as requested by Dr. Underwood.  She requires evaluation and anesthesia risk assessment prior to undergoing surgery/procedure for treatment of ORIF right distal radius .  Proposed procedure: fracture repair.    Date of Surgery/ Procedure: 2018  Time of Surgery/ Procedure: 9:45AM  Hospital/Surgical Facility: [facility]  [phone]  Primary Physician: Josee Carpenter  Type of Anesthesia Anticipated: to be determined    Patient has a Health Care Directive or Living Will:  NO    1. NO - Do you have a history of heart attack, stroke, stent, bypass or surgery on an artery in the head, neck, heart or legs?  2. NO - Do you ever have any pain or discomfort in your chest?  3. NO - Do you have a history of  Heart Failure?  4. NO - Are you troubled by shortness of breath when: walking on the level, up a slight hill or at night?  5. YES recovering from cold - Do you currently have a cold, bronchitis or other respiratory infection?  No fevers, but recent cold, still a little cough and nasal congestion.  6. YES - cold - Do you have a cough, shortness of breath or wheezing?  Still has cough in morning as recovering from cold sxs.    7. NO - Do you sometimes get pains in the calves of your legs when you walk?  8. YES mother/ sister - Do you or anyone in your family have previous history of blood clots? Pt has not had a blood clot  9. NO - Do you or does anyone in your family have a serious bleeding problem such as prolonged bleeding following surgeries or cuts?  10. YES - Have you ever had problems with anemia or been told to take iron pills?  About 25 years ago  11. NO - Have you had any abnormal blood loss such as black, tarry or bloody stools, or abnormal vaginal bleeding?  12. NO  - Have you ever had a blood transfusion?  13. NO - Have you or any of your relatives ever had problems with anesthesia?  14. NO - Do you have sleep apnea, excessive snoring or daytime drowsiness?  15. NO - Do you have any prosthetic heart valves?  16. NO - Do you have prosthetic joints?  17. NO - Is there any chance that you may be pregnant?        HPI:                                                      Brief HPI related to upcoming procedure: fell on ice and fractured radius and couldn't be reduced so putting a plate in.  Scheduled for 1/19/18.      See problem list for active medical problems.  Problems all longstanding and stable, except as noted/documented.  See ROS for pertinent symptoms related to these conditions.                                                                                                  .    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 Provider to review       Palpitations 06/05/2015     Priority: Medium     Advanced directives, counseling/discussion 10/04/2012     Priority: Medium     Discussed advance care planning with patient; information given to patient to review. 10/4/2012          CARDIOVASCULAR SCREENING; LDL GOAL LESS THAN 160 03/13/2012     Priority: Medium     Sjogren's syndrome (H) 08/16/2010     Priority: Medium     (Problem list name updated by automated process. Provider to review and confirm.)       Osteopenia      Priority: Medium     Thigh pain 08/14/2009     Priority: Medium      Past Medical History:   Diagnosis Date     Abnormal Pap smear 1995    had colpo 1995-hyperplasia, nl since      Arthritis      Disturbance of skin sensation      Osteopenia      Osteopenia      Seasonal allergies      Sjogren's syndrome (H)      Past Surgical History:   Procedure Laterality Date     BIOPSY  2000    right breast lumpectomy     BREAST LUMPECTOMY, RT/LT      right breast, benign     CATARACT IOL, RT/LT      bilateral     Current Outpatient Prescriptions   Medication Sig Dispense Refill     diclofenac (VOLTAREN) 1 % GEL topical gel Place onto the skin 4 times daily 100 g 6     carboxymethylcellulose (CELLUVISC/REFRESH LIQUIGEL) 1 % ophthalmic solution Place 1 drop into both eyes 3 times daily       Acetaminophen (TYLENOL PO) Take 325 mg by mouth       cetirizine (ZYRTEC) 10 MG tablet Take 10 mg by mouth daily       Calcium Carbonate-Vitamin D (CALCIUM 600 + D OR) Take by mouth daily       VITAMIN E 400 UNIT PO CAPS 1 CAPSULE DAILY       VITAMIN C 500 MG OR TABS 1 TABLET  DAILY       SUDAFED 30 MG OR TABS 1 TABLET EVERY 4 TO 6 HOURS AS NEEDED       OTC products:  NSAIDS - ibuprofen    Allergies   Allergen Reactions     Erythromycin Nausea     Tongue turned black.      Latex Allergy: NO    Social History   Substance Use Topics     Smoking status: Former Smoker     Quit date: 1/1/1984     Smokeless tobacco: Never Used      Comment: nonsmoking hosuehold     Alcohol use Yes      Comment: 1 glass of wine one/two times a week     History   Drug Use No       REVIEW OF SYSTEMS:                                                    C: NEGATIVE for fever, chills, change in weight  I: NEGATIVE for worrisome rashes, moles or lesions  E: NEGATIVE for vision changes or irritation  ENT/MOUTH: mild nasal congestion  RESP:mild cough as recovers from recent cold  B: NEGATIVE for masses, tenderness or discharge  CV: NEGATIVE for chest pain, palpitations or peripheral edema  GI: NEGATIVE for nausea, abdominal pain, heartburn, or change in bowel habits  : NEGATIVE for frequency, dysuria, or hematuria  M: NEGATIVE for significant  "arthralgias or myalgia  N: NEGATIVE for weakness, dizziness or paresthesias  E: NEGATIVE for temperature intolerance, skin/hair changes  H: NEGATIVE for bleeding problems  P: NEGATIVE for changes in mood or affect    EXAM:                                                    /69  Pulse 83  Temp 97.2  F (36.2  C) (Oral)  Ht 5' 6\" (1.676 m)  Wt 206 lb (93.4 kg)  SpO2 97%  BMI 33.25 kg/m2    GENERAL APPEARANCE: healthy, alert and no distress     EYES: EOMI, PERRL     HENT: ear canals and TM's normal and nose and mouth without ulcers or lesions     NECK: no adenopathy, no asymmetry, masses, or scars and thyroid normal to palpation     RESP: lungs clear to auscultation - no rales, rhonchi or wheezes     CV: regular rates and rhythm, normal S1 S2, no S3 or S4 and no murmur, click or rub     ABDOMEN:  soft, nontender, no HSM or masses and bowel sounds normal     MS: extremities normal- no gross deformities noted, no evidence of inflammation in joints, FROM in all extremities.     SKIN: no suspicious lesions or rashes     NEURO: Normal strength and tone, sensory exam grossly normal, mentation intact and speech normal     PSYCH: mentation appears normal. and affect normal/bright     LYMPHATICS: No axillary, cervical, or supraclavicular nodes    DIAGNOSTICS:                                                    EKG: appears normal, NSR, normal axis, normal intervals, no acute ST/T changes c/w ischemia, no LVH by voltage criteria, unchanged from previous tracings    Lab Results   Component Value Date    WBC 7.9 01/17/2018     Lab Results   Component Value Date    RBC 4.17 01/17/2018     Lab Results   Component Value Date    HGB 12.0 01/17/2018     Lab Results   Component Value Date    HCT 38.4 01/17/2018     No components found for: MCT  Lab Results   Component Value Date    MCV 92 01/17/2018     Lab Results   Component Value Date    MCH 28.8 01/17/2018     Lab Results   Component Value Date    MCHC 31.3 01/17/2018 "     Lab Results   Component Value Date    RDW 13.7 01/17/2018     Lab Results   Component Value Date     01/17/2018         Recent Labs   Lab Test  08/07/13   1232  08/18/10   1035   HGB  12.8   --    PLT  291   --    NA   --   139   POTASSIUM   --   4.1   CR   --   0.79        IMPRESSION:                                                    Reason for surgery/procedure: fracture  Diagnosis/reason for consult: minimize risk of surgery    The proposed surgical procedure is considered INTERMEDIATE risk.    REVISED CARDIAC RISK INDEX  The patient has the following serious cardiovascular risks for perioperative complications such as (MI, PE, VFib and 3  AV Block):  No serious cardiac risks  INTERPRETATION: 0 risks: Class I (very low risk - 0.4% complication rate)    The patient has the following additional risks for perioperative complications:  No identified additional risks      ICD-10-CM    1. Preop general physical exam Z01.818 EKG 12-LEAD RADIOLOGY     CBC with platelets   2. Primary osteoarthritis of both knees M17.0 diclofenac (VOLTAREN) 1 % GEL topical gel       RECOMMENDATIONS:                                                      --If needed, Consult hospital rounder / IM to assist post-op medical management    --Patient is to hold all medications the morning of surgery  --patient to stop all oral nsaid and vitamins now as surgery is in two days.  --patient to contact surgeon if any fevers or worsening of respiratory symptoms.    APPROVAL GIVEN to proceed with proposed procedure, without further diagnostic evaluation       Signed Electronically by: Anita Morocho MD    Copy of this evaluation report is provided to requesting physician.    [place] Preop Guidelines

## 2018-01-17 NOTE — MR AVS SNAPSHOT
After Visit Summary   1/17/2018    Karis Youssef    MRN: [id number]           Patient Information     Date Of Birth          1950        Visit Information        Provider Department      1/17/2018 9:40 AM Anita Morocho MD [facility]        Today's Diagnoses     Preop general physical exam    -  1    Primary osteoarthritis of both knees          Care Instructions      Before Your Surgery      Call your surgeon if there is any change in your health. This includes signs of a cold or flu (such as a sore throat, runny nose, cough, rash or fever).    Do not smoke, drink alcohol or take over the counter medicine (unless your surgeon or primary care doctor tells you to) for the 24 hours before and after surgery.    If you take prescribed drugs: Follow your doctor s orders about which medicines to take and which to stop until after surgery.    Eating and drinking prior to surgery: follow the instructions from your surgeon    Take a shower or bath the night before surgery. Use the soap your surgeon gave you to gently clean your skin. If you do not have soap from your surgeon, use your regular soap. Do not shave or scrub the surgery site.  Wear clean pajamas and have clean sheets on your bed.           Follow-ups after your visit        Follow-up notes from your care team     Return if symptoms worsen or fail to improve.      Who to contact     If you have questions or need follow up information about today's clinic visit or your schedule please contact [facility] directly at [phone].  Normal or non-critical lab and imaging results will be communicated to you by MyChart, letter or phone within 4 business days after the clinic has received the results. If you do not hear from us within 7 days, please contact the clinic through MyChart or phone. If you have a critical or abnormal lab result, we will notify you by phone as soon as possible.  Submit refill requests through  "[employer] or call your pharmacy and they will forward the refill request to us. Please allow 3 business days for your refill to be completed.          Additional Information About Your Visit        MyChart Information     [employer] gives you secure access to your electronic health record. If you see a primary care provider, you can also send messages to your care team and make appointments. If you have questions, please call your primary care clinic.  If you do not have a primary care provider, please call [phone] and they will assist you.        Care EveryWhere ID     This is your Care EveryWhere ID. This could be used by other organizations to access your [facility] medical records  [id number]        Your Vitals Were     Pulse Temperature Height Pulse Oximetry BMI (Body Mass Index)       83 97.2  F (36.2  C) (Oral) 5' 6\" (1.676 m) 97% 33.25 kg/m2        Blood Pressure from Last 3 Encounters:   01/17/18 129/69   01/11/18 132/81   12/20/17 120/71    Weight from Last 3 Encounters:   01/17/18 206 lb (93.4 kg)   01/11/18 209 lb 11.2 oz (95.1 kg)   12/20/17 206 lb (93.4 kg)              We Performed the Following     CBC with platelets     EKG 12-LEAD RADIOLOGY          Where to get your medicines      These medications were sent to [facility] [address] [place] [address], [address]  [address] [place] [address], [address] [address]     Phone:  [phone]     diclofenac 1 % Gel topical gel          Primary Care Provider Office Phone # Fax #    Josee Carpenter [phone] [address] [facility] [address]        Equal Access to Services     [facility] Hospital[facility]ANIEL : Hadii kristy Juarez, eldada luchauadaha, qaybta bharatialotto chaudhry. So [facility] [phone].    ATENCIÓN: Si habla español, tiene a lewis disposición servicios gratuitos de asistencia lingüística. Llame al [phone].    We comply with applicable federal civil rights laws " and [place] laws. We do not discriminate on the basis of race, color, national origin, age, disability, sex, sexual orientation, or gender identity.            Thank you!     [facility] you [facility]  [facility] your [facility]. Our goal is always to provide you with excellent care. Hearing back from our patients is one way we can continue to improve our services. Please take a few minutes to complete the written survey that you may receive in the mail after your visit with us. Thank you!             Your Updated Medication List - Protect others around you: Learn how to safely use, store and throw away your medicines at www.disposemymeds.org.          This list is accurate as of: 1/17/18 11:35 AM.  Always use your most recent med list.                   Brand Name Dispense Instructions for use Diagnosis    ascorbic acid 500 MG tablet    VITAMIN C     1 TABLET  DAILY    Sicca (H)       CALCIUM 600 + D PO      Take by mouth daily        carboxymethylcellulose 1 % ophthalmic solution    CELLUVISC/REFRESH LIQUIGEL     Place 1 drop into both eyes 3 times daily        cetirizine 10 MG tablet    zyrTEC     Take 10 mg by mouth daily        diclofenac 1 % Gel topical gel    VOLTAREN    100 g    Place onto the skin 4 times daily    Primary osteoarthritis of both knees       SUDAFED 30 MG tablet   Generic drug:  pseudoePHEDrine      1 TABLET EVERY 4 TO 6 HOURS AS NEEDED    Sicca (H)       TYLENOL PO      Take 325 mg by mouth        vitamin E 400 UNIT capsule      1 CAPSULE DAILY    Sicca (H)

## 2018-01-29 ENCOUNTER — TRANSFERRED RECORDS (OUTPATIENT)
Dept: HEALTH INFORMATION MANAGEMENT | Facility: CLINIC | Age: 68
End: 2018-01-29

## 2018-02-19 ENCOUNTER — TRANSFERRED RECORDS (OUTPATIENT)
Dept: HEALTH INFORMATION MANAGEMENT | Facility: CLINIC | Age: 68
End: 2018-02-19

## 2018-03-05 ENCOUNTER — TRANSFERRED RECORDS (OUTPATIENT)
Dept: HEALTH INFORMATION MANAGEMENT | Facility: CLINIC | Age: 68
End: 2018-03-05

## 2018-10-01 NOTE — PROGRESS NOTES
SUBJECTIVE:   Karis Youssef is a 68 year old female who presents to clinic today for the following health issues:      Vaginal Bleeding (Dysmenorrhea)      Onset: x 2 weeks    Description:    Duration of bleeding episodes: vaginal spotting x 2weeks.  Water, dark spotting,  Frequency between periods:  Has been menopausal for greater than 10 years  Describe bleeding/flow:   Clots: no   Number of pads/hour: n/a  Cramping: none     Intensity:  mild    Accompanying signs and symptoms: has markers for Sjogren's, and vulvar irritation    History (similar episodes/previous evaluation): was seen by OB/gyn in 2014     Precipitating or alleviating factors:Bleeding started after heavy lifting and moving tile boxes     Therapies tried and outcome: clobetasol has helped in the past when no bleeding was present       Has h/o lichen planus of vulva, no signs of that during this time.    Seems to be getting more bilateral ankle pain, redness/swelling, has sjogren's.    Problem list and histories reviewed & adjusted, as indicated.  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     Choroidal nevus, left     Acute pain of left shoulder     Past Surgical History:   Procedure Laterality Date     BIOPSY  2000    right breast lumpectomy     BREAST LUMPECTOMY, RT/LT      right breast, benign     CATARACT IOL, RT/LT      bilateral     ORTHOPEDIC SURGERY  01/2018    right radius        Social History   Substance Use Topics     Smoking status: Former Smoker     Quit date: 1/1/1984     Smokeless tobacco: Never Used      Comment: nonsmoking hosuehold     Alcohol use Yes      Comment: 1 glass of wine one/two times a week     Family History   Problem Relation Age of Onset      TAMIKAAILEANA Mother      Osteoporosis Mother      Hypertension Mother      HEART DISEASE Mother      CAD, CHF     Coronary Artery Disease Mother      Hyperlipidemia Mother      Diabetes Father      Hypertension Father      Cancer Brother      liver     Diabetes Brother      Diabetes Sister      Breast Cancer Sister 50     Hypertension Sister      Cancer Sister 72     non-hodgkins lymphoma     Cerebrovascular Disease Maternal Grandmother      Diabetes Paternal Grandfather      Connective Tissue Disorder Other      2 cousins and niece with MS; aunt with lupus     Rheumatoid Arthritis Cousin      Diabetes Niece      Hyperlipidemia Sister      Other Cancer Brother      Liver     Other Cancer Sister      non-hodgkins lymphoma     Thyroid Disease Sister      Osteoporosis Sister          Current Outpatient Prescriptions   Medication Sig Dispense Refill     Calcium Carbonate-Vitamin D (CALCIUM 600 + D OR) Take by mouth daily       carboxymethylcellulose (CELLUVISC/REFRESH LIQUIGEL) 1 % ophthalmic solution Place 1 drop into both eyes 3 times daily       cetirizine (ZYRTEC) 10 MG tablet Take 10 mg by mouth daily       diclofenac (VOLTAREN) 1 % GEL topical gel Place onto the skin 4 times daily 100 g 6     SUDAFED 30 MG OR TABS 1 TABLET EVERY 4 TO 6 HOURS AS NEEDED       VITAMIN C 500 MG OR TABS 1 TABLET  DAILY       VITAMIN E 400 UNIT PO CAPS 1 CAPSULE DAILY       Acetaminophen (TYLENOL PO) Take 325 mg by mouth       BP Readings from Last 3 Encounters:   10/04/18 133/74   01/17/18 129/69   01/11/18 132/81    Wt Readings from Last 3 Encounters:   10/04/18 206 lb 9.6 oz (93.7 kg)   01/17/18 206 lb (93.4 kg)   01/11/18 209 lb 11.2 oz (95.1 kg)                  Labs reviewed in EPIC    Reviewed and updated as needed this visit by clinical staff  Allergies  Meds  Problems       Reviewed and updated as needed this visit by Provider  Allergies  Meds  Problems         ROS:  Constitutional, HEENT, cardiovascular, pulmonary, gi and gu  systems are negative, except as otherwise noted.    OBJECTIVE:     /74  Pulse 78  Temp 98  F (36.7  C) (Oral)  Resp 18  Wt 206 lb 9.6 oz (93.7 kg)  BMI 33.35 kg/m2  Body mass index is 33.35 kg/(m^2).  GENERAL: healthy, alert and no distress  EYES: Eyes grossly normal to inspection, PERRL and conjunctivae and sclerae normal  MS: no gross musculoskeletal defects noted, no edema  SKIN: no suspicious lesions or rashes  PSYCH: mentation appears normal, affect normal/bright    Diagnostic Test Results:  none     ASSESSMENT/PLAN:     (N93.9) Vaginal bleeding  (primary encounter diagnosis)  Comment: postmenopausal  Plan: US Pelvic Complete w Transvaginal        Obtain US, plan embx if thickened    (M35.00) Sjogren's syndrome without extraglandular involvement (H)  Comment: possible increasing symptoms  Plan: RHEUMATOLOGY REFERRAL        Refer to rheum    (Z23) Need for vaccination  Comment: due  Plan: Pneumococcal vaccine 23 valent PPSV23          (Pneumovax) [45684], 1st  Administration          [27473]            (Z12.11) Special screening for malignant neoplasms, colon  Comment: due  Plan: Fecal colorectal cancer screen FIT - Future         (S+30)              Patient Instructions       Please  call [phone] to schedule your ultrasound.       Josee Carpenter MD  [facility] [place]

## 2018-10-04 ENCOUNTER — OFFICE VISIT (OUTPATIENT)
Dept: FAMILY MEDICINE | Facility: CLINIC | Age: 68
End: 2018-10-04
Payer: COMMERCIAL

## 2018-10-04 VITALS
DIASTOLIC BLOOD PRESSURE: 74 MMHG | RESPIRATION RATE: 18 BRPM | BODY MASS INDEX: 33.35 KG/M2 | SYSTOLIC BLOOD PRESSURE: 133 MMHG | HEART RATE: 78 BPM | TEMPERATURE: 98 F | WEIGHT: 206.6 LBS

## 2018-10-04 DIAGNOSIS — M35.00 SJOGREN'S SYNDROME WITHOUT EXTRAGLANDULAR INVOLVEMENT (H): ICD-10-CM

## 2018-10-04 DIAGNOSIS — N93.9 VAGINAL BLEEDING: Primary | ICD-10-CM

## 2018-10-04 DIAGNOSIS — Z23 NEED FOR VACCINATION: ICD-10-CM

## 2018-10-04 DIAGNOSIS — Z12.11 SPECIAL SCREENING FOR MALIGNANT NEOPLASMS, COLON: ICD-10-CM

## 2018-10-04 PROCEDURE — 99214 OFFICE O/P EST MOD 30 MIN: CPT | Mod: 25 | Performed by: FAMILY MEDICINE

## 2018-10-04 PROCEDURE — 90732 PPSV23 VACC 2 YRS+ SUBQ/IM: CPT | Performed by: FAMILY MEDICINE

## 2018-10-04 PROCEDURE — 90471 IMMUNIZATION ADMIN: CPT | Performed by: FAMILY MEDICINE

## 2018-10-04 NOTE — MR AVS SNAPSHOT
After Visit Summary   10/4/2018    Karis Youssef    MRN: [id number]           Patient Information     Date Of Birth          1950        Visit Information        Provider Department      10/4/2018 3:15 PM Josee Carpenter MD [facility]        Today's Diagnoses     Need for vaccination    -  1    Special screening for malignant neoplasms, colon        Sjogren's syndrome without extraglandular involvement (H)        Vaginal bleeding          Care Instructions        Please  call [phone] to schedule your ultrasound.           Follow-ups after your visit        Additional Services     RHEUMATOLOGY REFERRAL       Your provider has referred you to: [facility]: [facility] [address] [place] [phone]   [email or web address]    Please be aware that coverage of these services is subject to the terms and limitations of your health insurance plan.  Call member services at your health plan with any benefit or coverage questions.      Please bring the following with you to your appointment:    (1) Any X-Rays, CTs or MRIs which have been performed.  Contact the facility where they were done to arrange for  prior to your scheduled appointment.    (2) List of current medications   (3) This referral request   (4) Any documents/labs given to you for this referral                  Follow-up notes from your care team     Return in about 3 months (around 1/4/2019) for Physical Exam, Fasting Lab Work.      Future tests that were ordered for you today     Open Future Orders        Priority Expected Expires Ordered    Fecal colorectal cancer screen FIT - Future (S+30) Routine 10/22/2018 10/31/2018 10/4/2018    US Pelvic Complete w Transvaginal Routine  10/4/2019 10/4/2018            Who to contact     If you have questions or need follow up information about today's clinic visit or your schedule please contact [facility] directly at [phone].  Normal or  non-critical lab and imaging results will be communicated to you by MyChart, letter or phone within 4 business days after the clinic has received the results. If you do not hear from us within 7 days, please contact the clinic through [employer] or phone. If you have a critical or abnormal lab result, we will notify you by phone as soon as possible.  Submit refill requests through [employer] or call your pharmacy and they will forward the refill request to us. Please allow 3 business days for your refill to be completed.          Additional Information About Your Visit        [employer] Information     [employer] gives you secure access to your electronic health record. If you see a primary care provider, you can also send messages to your care team and make appointments. If you have questions, please call your primary care clinic.  If you do not have a primary care provider, please call [phone] and they will assist you.        Care EveryWhere ID     This is your Care EveryWhere ID. This could be used by other organizations to access your [facility] medical records  [id number]        Your Vitals Were     Pulse Temperature Respirations BMI (Body Mass Index)          78 98  F (36.7  C) (Oral) 18 33.35 kg/m2         Blood Pressure from Last 3 Encounters:   10/04/18 133/74   01/17/18 129/69   01/11/18 132/81    Weight from Last 3 Encounters:   10/04/18 206 lb 9.6 oz (93.7 kg)   01/17/18 206 lb (93.4 kg)   01/11/18 209 lb 11.2 oz (95.1 kg)              We Performed the Following     1st  Administration  [72865]     Pneumococcal vaccine 23 valent PPSV23  (Pneumovax) [78833]     RHEUMATOLOGY REFERRAL        Primary Care Provider Office Phone # Fax #    Josee Carpenter [phone] [address] [facility] [address]        Equal Access to Services     ADELINA DUQUE : Delphine Juarez, waaxda luqadaha, qaybta kaalmaherber stallworth, otto patel. So [facility]  [phone].    ATENCIÓN: Si jovana das, tiene a lewis disposición servicios gratuitos de asistencia lingüística. Jermain shaw [phone].    We comply with applicable federal civil rights laws and [place] laws. We do not discriminate on the basis of race, color, national origin, age, disability, sex, sexual orientation, or gender identity.            Thank you!     Thank you for choosing [facility]  [facility] your care. Our goal is always to provide you with excellent care. Hearing back from our patients is one way we can continue to improve our services. Please take a few minutes to complete the written survey that you may receive in the mail after your visit with us. Thank you!             Your Updated Medication List - Protect others around you: Learn how to safely use, store and throw away your medicines at www.disposemymeds.org.          This list is accurate as of 10/4/18  4:02 PM.  Always use your most recent med list.                   Brand Name Dispense Instructions for use Diagnosis    ascorbic acid 500 MG tablet    VITAMIN C     1 TABLET  DAILY    Sicca (H)       CALCIUM 600 + D PO      Take by mouth daily        carboxymethylcellulose 1 % ophthalmic solution    CELLUVISC/REFRESH LIQUIGEL     Place 1 drop into both eyes 3 times daily        cetirizine 10 MG tablet    zyrTEC     Take 10 mg by mouth daily        diclofenac 1 % Gel topical gel    VOLTAREN    100 g    Place onto the skin 4 times daily    Primary osteoarthritis of both knees       SUDAFED 30 MG tablet   Generic drug:  pseudoePHEDrine      1 TABLET EVERY 4 TO 6 HOURS AS NEEDED    Sicca (H)       TYLENOL PO      Take 325 mg by mouth        vitamin E 400 UNIT capsule      1 CAPSULE DAILY    Sicca (H)

## 2018-10-04 NOTE — NURSING NOTE
"Chief Complaint   Patient presents with     Vaginal Bleeding       Initial /74  Pulse 78  Temp 98  F (36.7  C) (Oral)  Resp 18  Wt 206 lb 9.6 oz (93.7 kg)  BMI 33.35 kg/m2 Estimated body mass index is 33.35 kg/(m^2) as calculated from the following:    Height as of 1/17/18: 5' 6\" (1.676 m).    Weight as of this encounter: 206 lb 9.6 oz (93.7 kg).  Medication Reconciliation: complete  Pipe Best CMA    "

## 2018-10-04 NOTE — LETTER
My COPD Action Plan     Name: Karis Youssef    YOB: 1950   Date: 10/1/2018    My doctor: Josee Carpenter MD   My clinic: [address] [address] [phone]  My Controller Medicine: none   Dose:      My Rescue Medicine: none   Dose:      My Flare Up Medicine: none   Dose:      My COPD Severity: Moderate = FeV1 < 79% -50%      Use of Oxygen: Oxygen Not Prescribed      Make sure you've had your pneumonia   vaccines.          GREEN ZONE       Doing well today      Usual level of activity and exercise    Usual amount of cough and mucus    No shortness of breath    Usual level of health (thinking clearly, sleeping well, feel like eating) Actions:      Take daily medicines    Use oxygen as prescribed    Follow regular exercise and diet plan    Avoid cigarette smoke and other irritants that harm the lungs           YELLOW ZONE          Having a bad day or flare up      Short of breath more than usual    A lot more sputum (mucus) than usual    Sputum looks yellow, green, tan, brown or bloody    More coughing or wheezing    Fever or chills    Less energy; trouble completing activities    Trouble thinking or focusing    Using quick relief inhaler or nebulizer more often    Poor sleep; symptoms wake me up    Do not feel like eating Actions:      Get plenty of rest    Take daily medicines    Use quick relief inhaler every 4 hours    If you use oxygen, call you doctor to see if you should adjust your oxygen    Do breathing exercises or other things to help you relax    Let a loved one, friend or neighbor know you are feeling worse    Call your care team if you have 2 or more symptoms.  Start taking steroids or antibiotics if directed by your care team           RED ZONE       Need medical care now      Severe shortness of breath (feel you can't breathe)    Fever, chills    Not enough breath to do any activity    Trouble coughing up mucus, walking or  talking    Blood in mucus    Frequent coughing   Rescue medicines are not working    Not able to sleep because of breathing    Feel confused or drowsy    Chest pain    Actions:      Call your health care team.  If you cannot reach your care team, call 911 or go to the emergency room.        Annual Reminders:  Meet with Care Team, Flu Shot every Fall  Pharmacy:    [facility]  [facility] [place] [facility] [address], [address]  [id number] [place] [place] - [address] AT [facility]

## 2018-10-05 ENCOUNTER — MYC MEDICAL ADVICE (OUTPATIENT)
Dept: FAMILY MEDICINE | Facility: CLINIC | Age: 68
End: 2018-10-05

## 2018-10-11 ENCOUNTER — RADIANT APPOINTMENT (OUTPATIENT)
Dept: ULTRASOUND IMAGING | Facility: CLINIC | Age: 68
End: 2018-10-11
Attending: FAMILY MEDICINE
Payer: COMMERCIAL

## 2018-10-11 DIAGNOSIS — N93.9 VAGINAL BLEEDING: ICD-10-CM

## 2018-10-11 PROCEDURE — 76830 TRANSVAGINAL US NON-OB: CPT

## 2018-10-11 PROCEDURE — 76856 US EXAM PELVIC COMPLETE: CPT

## 2018-10-15 ENCOUNTER — MYC MEDICAL ADVICE (OUTPATIENT)
Dept: FAMILY MEDICINE | Facility: CLINIC | Age: 68
End: 2018-10-15

## 2018-10-16 PROCEDURE — 82274 ASSAY TEST FOR BLOOD FECAL: CPT | Performed by: FAMILY MEDICINE

## 2018-10-18 ENCOUNTER — TELEPHONE (OUTPATIENT)
Dept: FAMILY MEDICINE | Facility: CLINIC | Age: 68
End: 2018-10-18

## 2018-10-18 NOTE — TELEPHONE ENCOUNTER
Patient was told she needed an endometrial biopsy and has been waiting for a call to schedule. She has also sent my chart messages. Please call as she wants this done. Ok to leave a message.

## 2018-10-19 DIAGNOSIS — Z12.11 SPECIAL SCREENING FOR MALIGNANT NEOPLASMS, COLON: ICD-10-CM

## 2018-10-19 LAB — HEMOCCULT STL QL IA: NEGATIVE

## 2018-11-01 ENCOUNTER — OFFICE VISIT (OUTPATIENT)
Dept: FAMILY MEDICINE | Facility: CLINIC | Age: 68
End: 2018-11-01
Payer: COMMERCIAL

## 2018-11-01 VITALS
TEMPERATURE: 97.3 F | DIASTOLIC BLOOD PRESSURE: 66 MMHG | HEART RATE: 79 BPM | RESPIRATION RATE: 18 BRPM | SYSTOLIC BLOOD PRESSURE: 130 MMHG | OXYGEN SATURATION: 96 % | BODY MASS INDEX: 33.22 KG/M2 | WEIGHT: 205.8 LBS

## 2018-11-01 DIAGNOSIS — R93.89 ENDOMETRIAL THICKENING ON ULTRASOUND: ICD-10-CM

## 2018-11-01 DIAGNOSIS — N95.0 POSTMENOPAUSAL BLEEDING: Primary | ICD-10-CM

## 2018-11-01 PROCEDURE — 58100 BIOPSY OF UTERUS LINING: CPT | Performed by: FAMILY MEDICINE

## 2018-11-01 PROCEDURE — 88305 TISSUE EXAM BY PATHOLOGIST: CPT | Performed by: FAMILY MEDICINE

## 2018-11-01 NOTE — LETTER
My COPD Action Plan     Name: Karis Youssef    YOB: 1950   Date: 10/29/2018    My doctor: Josee Carpenter MD   My clinic: [address] [address] [phone]  My Controller Medicine: { :370311}   Dose: ***     My Rescue Medicine: { :840586}   Dose: ***     My Flare Up Medicine: { :330683}   Dose: ***     My COPD Severity: { :217451}      Use of Oxygen: { :414516}     Make sure you've had your pneumonia   vaccines.          GREEN ZONE       Doing well today      Usual level of activity and exercise    Usual amount of cough and mucus    No shortness of breath    Usual level of health (thinking clearly, sleeping well, feel like eating) Actions:      Take daily medicines    Use oxygen as prescribed    Follow regular exercise and diet plan    Avoid cigarette smoke and other irritants that harm the lungs           YELLOW ZONE          Having a bad day or flare up      Short of breath more than usual    A lot more sputum (mucus) than usual    Sputum looks yellow, green, tan, brown or bloody    More coughing or wheezing    Fever or chills    Less energy; trouble completing activities    Trouble thinking or focusing    Using quick relief inhaler or nebulizer more often    Poor sleep; symptoms wake me up    Do not feel like eating Actions:      Get plenty of rest    Take daily medicines    Use quick relief inhaler every *** hours    If you use oxygen, call you doctor to see if you should adjust your oxygen    Do breathing exercises or other things to help you relax    Let a loved one, friend or neighbor know you are feeling worse    Call your care team if you have 2 or more symptoms.  Start taking steroids or antibiotics if directed by your care team           RED ZONE       Need medical care now      Severe shortness of breath (feel you can't breathe)    Fever, chills    Not enough breath to do any activity    Trouble coughing up mucus, walking or  talking    Blood in mucus    Frequent coughing   Rescue medicines are not working    Not able to sleep because of breathing    Feel confused or drowsy    Chest pain    Actions:      Call your health care team.  If you cannot reach your care team, call 911 or go to the emergency room.        Annual Reminders:  Meet with Care Team, Flu Shot every Fall  Pharmacy:    [facility]  [place] [facility] [place] - [address], [address]  [id number] [place], [place] - [address] AT [facility]

## 2018-11-01 NOTE — PATIENT INSTRUCTIONS
Watch for MyChart results message in about 1 week.        Monitor for symptoms of infection to include a foul smelling discharge, abdominal tenderness or fever without other explanation.  Monitor for heavy bleeding (soaking a maxi pad every hour for 2-3 hours).

## 2018-11-01 NOTE — NURSING NOTE
"Chief Complaint   Patient presents with     Biopsy       Initial /85  Pulse 79  Temp 97.3  F (36.3  C) (Oral)  Resp 18  Wt 205 lb 12.8 oz (93.4 kg)  SpO2 96%  BMI 33.22 kg/m2 Estimated body mass index is 33.22 kg/(m^2) as calculated from the following:    Height as of 1/17/18: 5' 6\" (1.676 m).    Weight as of this encounter: 205 lb 12.8 oz (93.4 kg).  Medication Reconciliation: complete  Pipe Best CMA    "

## 2018-11-01 NOTE — PROGRESS NOTES
Karis Youssef is 68 year old female who presents today for endometrial biopsy.  Indications for the procedure are postmenopausal bleeding with thickened endometrial stripe of 9mm on pelvic US    No LMP recorded. Patient is postmenopausal.        The procedure technique was explained to the patient,as well as possible risks of infection or bleeding.  Patient gives her verbal consent to proceed.    Speculum was placed into the vaginal vault: Cervix was visualized and appears multiparous.  Cervix was prepped  with betadine.  A pipelle was easily introduced through the cervical os.  The uterus sounded to 8  cm.  Tenaculum was  used on the cervix. Endometrial sample was obtained.  There was very minimal bleeding from the cervical os after removal of the pipelle.  Silver nitrate was not used to cauterize the tenaculum sites.  The patient tolerated the procedure well.    A: Endometrial Biopsy    P: Pathology report pending.  Will contact patient when results become available.  The patient will refrain from intercourse for 72 hours.  Doxycline 100 mg bid po for 5 days given for infection prophylaxis.  She report any difficulty with pelvic pain, bleeding or fevers.

## 2018-11-01 NOTE — LETTER
"                                                                          Affirmation of Informed Consent for Surgery or Invasive Procedure    1.  I, (print patient's name) Karis Youssef,  1950,   a.  Agree that I will have (include both the medical term and patient words):           Chief Complaint   Patient presents with     Biopsy      b.  At [facility].     c.  The reason for this procedure is (medical condition):  Endometrial biopsy.   d.  This will be done or supervised by:  Josee Carpenter MD.   e.  My doctor may have help from others.  Help could include opening or closing         the wound. Help might also include taking grafts, cutting out tissue,                           implanting devices.  I have been told who will help, if known.     2.  I have talked to my doctor or health care team about:   a.  What the procedure is and what will happen.   b   How it may help me (the benefits).   c.  How it may harm me (the most likely and most serious risks).   d.  The long-term effects the procedure might have.    e.  My other choices for treatment.  The risks and benefits of those choices.    f.   What will likely happen if I say \"no\" to this procedure.    g.  How I might feel right after and how quickly I might be expected to recover.      h.  What medicines will be used to manage pain or sedate me.     3.  I agree that:  (If I do not agree with a statement, I have crossed it out and              initialed next to it.)       a.  I will ask questions.     b.  No one has promised me definite results.    c.  If serious problems are found during the procedure, the treatment may                    change.   d.  If I have \"do not resuscitate\" (DNR) wishes, I have discussed this with my                              doctor and they will be put on hold during the procedure.   e. Students and other appropriate people, approved by the facility, may watch                      the procedure and help with " tasks they are qualified for.                                                    f.  Pictures or videos may be taken. They may be used for medical or                  educational reasons only.       g. Tissues or organs removed from my body as part of the normal course of the                    procedure may be used for research or teaching purposes. They will be                  disposed of with respect.                    h.  If a staff person is exposed to my blood or body fluids, my blood will be drawn                   and tested for HIV and hepatitis.  I understand that by law, the test results will                    go:         -  In my medical record.                         -  To the Employee Occupational Health Service and/or Infection Control                                  at this facility.    -  To the [place] [facility] officials.      i.  I may have a blood transfusion: I have talked to my doctor or care team about:    -  Why I may need a blood transfusion.     - The risks, benefits, and side effects of transfusion - and the risks of not        Having one.     -  Blood safety and other options for treatment.        Consent for blood transfusion obtained during a hospital admission is valid for the entire hospital stay.  Consent  for blood transfusion obtained in the clinic setting is valid for a year from the signature date.                                4.  I understand that:   a.  I can change my mind.  If I do, I must tell my doctor or team as soon as                           possible.              b.  The team members may change during the procedure.                c.   The team will double-check who I am.  They will ask me what I am having                         done and the site of the site of the procedure.  This is done for my safety.    My questions have been answered.  I agree to the procedure.  I have made my special needs and instructions known.      Karis Youssef      11/1/2018 11:56  AM  Patient (or representative)/Relationship to patient             Date  Time        Witness:  I have verified that the signature is that of the patient or patient's representative and that this has been signed before the procedure:    NAME:        11/1/2018  11:56 AM         Date  Time  Person verifying patient's name or patient representative's signature     Provider:  I have discussed the procedure and the information stated above with the patient (or the patient's representative) and answered their questions. The patient or their representative consented to the procedure:      Josee Carpenter MD    11/1/2018  11:56 AM  Physician or Provider's Signature(s)   Date  Time       Intepreter (if used):       11/1/2018  11:56 AM                                   Name       Language/Organization Date  Time    Consent for procedure valid for 30 days after patient signature date     [facility]  AFFIRMATION OF INFORMED CONSENT FOR SURGERY OR INVASIVE PROCEDURE               Original - Medical Records

## 2018-11-01 NOTE — MR AVS SNAPSHOT
After Visit Summary   11/1/2018    Karis Youssef    MRN: [id number]           Patient Information     Date Of Birth          1950        Visit Information        Provider Department      11/1/2018 11:55 AM Josee Carpenter MD [facility]        Care Instructions      Watch for Planana results message in about 1 week.        Monitor for symptoms of infection to include a foul smelling discharge, abdominal tenderness or fever without other explanation.  Monitor for heavy bleeding (soaking a maxi pad every hour for 2-3 hours).           Follow-ups after your visit        Your next 10 appointments already scheduled     Feb 19, 2019  9:00 AM CST   New Visit with [facility]    [address] [place] [id number] [phone]              Who to contact     If you have questions or need follow up information about today's clinic visit or your schedule please contact [facility] directly at [phone].  Normal or non-critical lab and imaging results will be communicated to you by MyChart, letter or phone within 4 business days after the clinic has received the results. If you do not hear from us within 7 days, please contact the clinic through MyChart or phone. If you have a critical or abnormal lab result, we will notify you by phone as soon as possible.  Submit refill requests through Planana or call your pharmacy and they will forward the refill request to us. Please allow 3 business days for your refill to be completed.          Additional Information About Your Visit        FastCAPhart Information     Planana gives you secure access to your electronic health record. If you see a primary care provider, you can also send messages to your care team and make appointments. If you have questions, please call your primary care clinic.  If you do not have a primary care provider, please  call [phone] and they will assist you.        Care EveryWhere ID     This is your Care EveryWhere ID. This could be used by other organizations to access your [place] medical records  [id number]        Your Vitals Were     Pulse Temperature Respirations Pulse Oximetry BMI (Body Mass Index)       79 97.3  F (36.3  C) (Oral) 18 96% 33.22 kg/m2        Blood Pressure from Last 3 Encounters:   11/01/18 130/66   10/04/18 133/74   01/17/18 129/69    Weight from Last 3 Encounters:   11/01/18 205 lb 12.8 oz (93.4 kg)   10/04/18 206 lb 9.6 oz (93.7 kg)   01/17/18 206 lb (93.4 kg)              Today, you had the following     No orders found for display       Primary Care Provider Office Phone # Fax #    [place] Dori Carpenter [phone] [address] [address] [address]        Equal Access to Services     SANDY DUQUE : Hadii aad ku hadasho Soomaali, waaxda luqadaha, qaybta kaalmada adeegyada, waxay idiin hayaan adeeg michaelaradelbert mayers . So [facility] [phone].    ATENCIÓN: Si habla español, tiene a lewis disposición servicios gratuitos de asistencia lingüística. Ll[facility] [phone].    We comply with applicable federal civil rights laws and [place] laws. We do not discriminate on the basis of race, color, national origin, age, disability, sex, sexual orientation, or gender identity.            Thank you!     Thank you for choosing [facility]  for your care. Our goal is always to provide you with excellent care. Hearing back from our patients is one way we can continue to improve our services. Please take a few minutes to complete the written survey that you may receive in the mail after your visit with us. Thank you!             Your Updated Medication List - Protect others around you: Learn how to safely use, store and throw away your medicines at www.disposemymeds.org.          This list is accurate as of 11/1/18 12:19 PM.  Always use your most recent med list.                    Brand Name Dispense Instructions for use Diagnosis    ascorbic acid 500 MG tablet    VITAMIN C     1 TABLET  DAILY    Sicca (H)       CALCIUM 600 + D PO      Take by mouth daily        carboxymethylcellulose 1 % ophthalmic solution    CELLUVISC/REFRESH LIQUIGEL     Place 1 drop into both eyes 3 times daily        cetirizine 10 MG tablet    zyrTEC     Take 10 mg by mouth daily        diclofenac 1 % Gel topical gel    VOLTAREN    100 g    Place onto the skin 4 times daily    Primary osteoarthritis of both knees       MAGNESIUM PO      Take 250 mg by mouth        SUDAFED 30 MG tablet   Generic drug:  pseudoePHEDrine      1 TABLET EVERY 4 TO 6 HOURS AS NEEDED    Sicca (H)       TYLENOL PO      Take 325 mg by mouth        vitamin E 400 UNIT capsule      1 CAPSULE DAILY    Sicca (H)

## 2018-11-08 LAB — COPATH REPORT: NORMAL

## 2019-02-19 ENCOUNTER — OFFICE VISIT (OUTPATIENT)
Dept: RHEUMATOLOGY | Facility: CLINIC | Age: 69
End: 2019-02-19
Payer: COMMERCIAL

## 2019-02-19 VITALS
HEART RATE: 78 BPM | SYSTOLIC BLOOD PRESSURE: 125 MMHG | BODY MASS INDEX: 33.11 KG/M2 | DIASTOLIC BLOOD PRESSURE: 80 MMHG | RESPIRATION RATE: 16 BRPM | WEIGHT: 206 LBS | HEIGHT: 66 IN | OXYGEN SATURATION: 96 % | TEMPERATURE: 98.1 F

## 2019-02-19 DIAGNOSIS — M35.01 SJOGREN'S SYNDROME WITH KERATOCONJUNCTIVITIS SICCA (H): Primary | ICD-10-CM

## 2019-02-19 LAB
ALBUMIN SERPL-MCNC: 3.6 G/DL (ref 3.4–5)
ALBUMIN UR-MCNC: NEGATIVE MG/DL
ALP SERPL-CCNC: 103 U/L (ref 40–150)
ALT SERPL W P-5'-P-CCNC: 28 U/L (ref 0–50)
ANION GAP SERPL CALCULATED.3IONS-SCNC: 7 MMOL/L (ref 3–14)
APPEARANCE UR: CLEAR
AST SERPL W P-5'-P-CCNC: 21 U/L (ref 0–45)
BASOPHILS # BLD AUTO: 0 10E9/L (ref 0–0.2)
BASOPHILS NFR BLD AUTO: 0.5 %
BILIRUB SERPL-MCNC: 0.3 MG/DL (ref 0.2–1.3)
BILIRUB UR QL STRIP: NEGATIVE
BUN SERPL-MCNC: 17 MG/DL (ref 7–30)
CALCIUM SERPL-MCNC: 9.6 MG/DL (ref 8.5–10.1)
CHLORIDE SERPL-SCNC: 107 MMOL/L (ref 94–109)
CO2 SERPL-SCNC: 27 MMOL/L (ref 20–32)
COLOR UR AUTO: YELLOW
CREAT SERPL-MCNC: 0.64 MG/DL (ref 0.52–1.04)
CREAT UR-MCNC: 86 MG/DL
DIFFERENTIAL METHOD BLD: NORMAL
EOSINOPHIL # BLD AUTO: 0.3 10E9/L (ref 0–0.7)
EOSINOPHIL NFR BLD AUTO: 3.8 %
ERYTHROCYTE [DISTWIDTH] IN BLOOD BY AUTOMATED COUNT: 13.7 % (ref 10–15)
GFR SERPL CREATININE-BSD FRML MDRD: >90 ML/MIN/{1.73_M2}
GLUCOSE SERPL-MCNC: 99 MG/DL (ref 70–99)
GLUCOSE UR STRIP-MCNC: NEGATIVE MG/DL
HCT VFR BLD AUTO: 39.9 % (ref 35–47)
HGB BLD-MCNC: 12.8 G/DL (ref 11.7–15.7)
HGB UR QL STRIP: NEGATIVE
KETONES UR STRIP-MCNC: NEGATIVE MG/DL
LEUKOCYTE ESTERASE UR QL STRIP: NEGATIVE
LYMPHOCYTES # BLD AUTO: 2.7 10E9/L (ref 0.8–5.3)
LYMPHOCYTES NFR BLD AUTO: 35.3 %
MCH RBC QN AUTO: 29.2 PG (ref 26.5–33)
MCHC RBC AUTO-ENTMCNC: 32.1 G/DL (ref 31.5–36.5)
MCV RBC AUTO: 91 FL (ref 78–100)
MONOCYTES # BLD AUTO: 0.7 10E9/L (ref 0–1.3)
MONOCYTES NFR BLD AUTO: 9.5 %
NEUTROPHILS # BLD AUTO: 3.8 10E9/L (ref 1.6–8.3)
NEUTROPHILS NFR BLD AUTO: 50.9 %
NITRATE UR QL: NEGATIVE
PH UR STRIP: 6 PH (ref 5–7)
PLATELET # BLD AUTO: 317 10E9/L (ref 150–450)
POTASSIUM SERPL-SCNC: 4.4 MMOL/L (ref 3.4–5.3)
PROT SERPL-MCNC: 7.1 G/DL (ref 6.8–8.8)
PROT UR-MCNC: 0.05 G/L
PROT/CREAT 24H UR: 0.06 G/G CR (ref 0–0.2)
RBC # BLD AUTO: 4.38 10E12/L (ref 3.8–5.2)
SODIUM SERPL-SCNC: 141 MMOL/L (ref 133–144)
SOURCE: NORMAL
SP GR UR STRIP: 1.02 (ref 1–1.03)
UROBILINOGEN UR STRIP-ACNC: 0.2 EU/DL (ref 0.2–1)
WBC # BLD AUTO: 7.5 10E9/L (ref 4–11)

## 2019-02-19 PROCEDURE — 80053 COMPREHEN METABOLIC PANEL: CPT | Performed by: INTERNAL MEDICINE

## 2019-02-19 PROCEDURE — 81003 URINALYSIS AUTO W/O SCOPE: CPT | Performed by: INTERNAL MEDICINE

## 2019-02-19 PROCEDURE — 85025 COMPLETE CBC W/AUTO DIFF WBC: CPT | Performed by: INTERNAL MEDICINE

## 2019-02-19 PROCEDURE — 99204 OFFICE O/P NEW MOD 45 MIN: CPT | Performed by: INTERNAL MEDICINE

## 2019-02-19 PROCEDURE — 36415 COLL VENOUS BLD VENIPUNCTURE: CPT | Performed by: INTERNAL MEDICINE

## 2019-02-19 PROCEDURE — 84156 ASSAY OF PROTEIN URINE: CPT | Performed by: INTERNAL MEDICINE

## 2019-02-19 ASSESSMENT — PAIN SCALES - GENERAL: PAINLEVEL: MILD PAIN (2)

## 2019-02-19 ASSESSMENT — MIFFLIN-ST. JEOR: SCORE: 1483.22

## 2019-02-19 NOTE — PROGRESS NOTES
Rheumatology Clinic Visit      Karis Youssef MRN# [id number]   YOB: 1950 Age: 68 year old      Date of visit: 2/19/19   Referring provider: Dr. Josee Carpenter  PCP: Dr. Josee Carpenter    Chief Complaint   Patient presents with:  Consult: Sjogren's syndrome     Assessment and Plan     1. Sjogren's Syndrome (ZELDA+, SSA+, sicca symptoms): Diagnosed in 2009 by Dr. Delatorre. Currently doing well with frequent sips of water and artificial tears.  She does not have other symptoms to suggest extraglandular involvement; check labs today.  - Labs today: CBC, CMP, UA, Uprotein:creatinine    2. Bilateral knee pain: Osteoarthritis and meniscal tears based on patient history and following with orthopedic surgeon at [facility].  Symptoms are mechanical in nature.  She is doing well with activity modification and acupuncture; she does not limit her activities because of knee pain.  Advised to continue following with orthopedic surgery.  Would benefit from weight loss, something that she pointed out on her own.    3. Bilateral dorsal midfoot stiffness in the morning for <10 minutes: bony hypertrophy on exam and consistent with a degenerative change.      4. Thicker respiratory secretions in the morning during winter months: Possibly related to post-nasal drip or GERD.  She is already on Claritin by recommendation from her ophthalmologist, per patient.  I recommended that since she has been on claritin for 1 year that she try changing to Zyrtec to see if more benefit, and to empirically treat GERD with ranitidine at bedtime for a month to see if helpful.  Humidification in the bedroom may also be helpful.  Overall less likely Sjogren's related but it is possible; if the above does not alleviate symptoms then possibly a trial of pilocarpine could be used but she was not interested in doing this.    5. Osteopenia: following with her PCP for this issue.  Was on oral bisphosphonates in the past with worsening  GERD symptoms so she stopped.  Recommend rechecking DEXA 5 years after the last one to see if treatment is indicated by either T-scores or FRAX.    6. Vaccines: already vaccinated with [id number] and [id number].  Would benefit from Shingrix vaccine    Ms. Youssef verbalized agreement with and understanding of the rational for the diagnosis and treatment plan.  All questions were answered to best of my ability and the patient's satisfaction. Ms. Youssef was advised to contact the clinic with any questions that may arise after the clinic visit.      Thank you for involving me in the care of the patient    Return to clinic: PRN and depends on lab results    HPI   Karis Youssef is a 68 year old female with a past medical history significant for Sjogren's syndrome and osteopenia who is seen in consultation at the request of Dr. Josee Carpenter for evaluation of Sjogren's Syndrome.    4/23/2013 rheumatology clinic note by Dr. Kwabena Delatorre documents that the patient was following up for a positive SSA and sicca symptoms..  Positive ZELDA.  Positive SSB.  Does not have evidence of other connective tissue disease.  No arthritis, vasculitis, and her review of systems for respiratory, cardiovascular, and CNS is normal.  No rash or parotid swelling.  No new neurologic symptoms.  No lymphadenopathy.  He advised lip biopsy to confirm.    10/4/2018 clinic note by Dr. Carpenter documents Sjogren's with possibly increasing symptoms so was referred to rheumatology.    Today, Ms. Youssef reports that she has had joint issues most of her life. TMJ tx'd by not chewing gum.  Bilateral knee pain: started 20 yrs ago with torn meniscus, and now bone on bone and was told she may need TKA. Left knee sometimes gives out so she uses hiking poles; was able to hike all over [place] going up and down stairs with worsening knee pain that resolved with rest and acupuncture.  Plans to retire in May 2019.  Fell last year with distal radius and distal ulna  fracture s/p ORIF.   Some stiffness in the dorsal midfeet for 10 minutes; and sometimes in the left shoulder that resolves within 10 minutes. Hx of left frozen shoulder that resolved with physical therapy. There was a concern for COPD at one point but she saw a pulmonologist who told her that she does not have COPD.  Thicker respiratory secretions in the morning that takes a little bit of time to get them going; she was told that she may have postnasal drip by her allergist and is taking Claritin for this.  Dry mouth is treated with frequent sips of water; she reports seeing a dentist twice yearly and has not had recent cavities.  Dry eyes are treated with artificial tears 3 times daily; she does not notice a difference between the ones that are preservative-free versus the ones that have preservative.  She tells me that her ophthalmologist recommended she take Claritin for allergies.    Denies fevers, chills, nausea, vomiting, constipation, diarrhea. No abdominal pain. No chest pain/pressure, palpitations, or shortness of breath. No LE swelling. No neck pain. No oral or nasal sores.  No rash. No photosensitivity or photophobia.  No history of serositis.  No history of Raynaud's Phenomenon.  No known neurologic disorder.  No known renal disorder.      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  See HPI  CV: No chest pain, pressure, palpitations, or dyspnea on exertion.  PULM: No SOB, wheeze, cough.  GI: No nausea, vomiting, constipation, diarrhea. No blood in stool. No abdominal pain.  : No blood in urine.  MSK: See HPI.  NEURO: No numbness, tingling, or weakness.  ENDO: No heat/cold intolerance.  EXT: No LE swelling  PSYCH: Negative    Active Problem List     Patient Active Problem List    Diagnosis     Thigh pain     Osteopenia     Sjogren's syndrome (H)     CARDIOVASCULAR SCREENING; LDL GOAL LESS THAN 160     Advanced directives, counseling/discussion     Palpitations     Acute conjunctivitis     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     Choroidal nevus, left     Acute pain of left shoulder     Past Medical History     Past Medical History:   Diagnosis Date     Abnormal Pap smear 1995    had colpo 1995-hyperplasia, nl since     Arthritis      Disturbance of skin sensation      Osteopenia      Osteopenia      Seasonal allergies      Sjogren's syndrome (H)      Past Surgical History     Past Surgical History:   Procedure Laterality Date     BIOPSY  2000    right breast lumpectomy     BREAST LUMPECTOMY, RT/LT      right breast, benign     CATARACT IOL, RT/LT      bilateral     ORTHOPEDIC SURGERY  01/2018    right radius      Allergy     Allergies   Allergen Reactions     Erythromycin Nausea     Tongue turned black.     Current Medication List     Current Outpatient Medications   Medication Sig     Acetaminophen (TYLENOL PO) Take 325 mg by mouth     Calcium Carbonate-Vitamin D (CALCIUM 600 + D OR) Take by mouth daily     carboxymethylcellulose (CELLUVISC/REFRESH LIQUIGEL) 1 % ophthalmic solution Place 1 drop into both eyes 3 times daily     cetirizine (ZYRTEC) 10 MG tablet Take 10 mg by mouth daily     diclofenac (VOLTAREN) 1 % GEL topical gel Place onto the skin 4 times daily     MAGNESIUM PO Take 250 mg by mouth     SUDAFED 30 MG OR TABS 1 TABLET EVERY 4 TO 6 HOURS AS NEEDED     VITAMIN C 500 MG OR TABS 1 TABLET  DAILY     VITAMIN E 400 UNIT PO CAPS 1 CAPSULE DAILY     No current facility-administered medications for this visit.          Social History   See HPI    Family History     Family History   Problem Relation Age of Onset     C.A.D. Mother      Osteoporosis Mother      Hypertension Mother      Heart Disease Mother         CAD, CHF     Coronary Artery Disease Mother      Hyperlipidemia Mother   "    Diabetes Father      Hypertension Father      Cancer Brother         liver     Diabetes Brother      Diabetes Sister      Breast Cancer Sister 50     Hypertension Sister      Cancer Sister 72        non-hodgkins lymphoma     Cerebrovascular Disease Maternal Grandmother      Diabetes Paternal Grandfather      Connective Tissue Disorder Other         2 cousins and niece with MS; aunt with lupus     Rheumatoid Arthritis Cousin      Diabetes Niece      Hyperlipidemia Sister      Other Cancer Brother         Liver     Other Cancer Sister         non-hodgkins lymphoma     Thyroid Disease Sister      Osteoporosis Sister        Physical Exam     Temp Readings from Last 3 Encounters:   02/19/19 98.1  F (36.7  C) (Oral)   11/01/18 97.3  F (36.3  C) (Oral)   10/04/18 98  F (36.7  C) (Oral)     BP Readings from Last 5 Encounters:   02/19/19 125/80   11/01/18 130/66   10/04/18 133/74   01/17/18 129/69   01/11/18 132/81     Pulse Readings from Last 1 Encounters:   02/19/19 78     Resp Readings from Last 1 Encounters:   02/19/19 16     Estimated body mass index is 33.76 kg/m  as calculated from the following:    Height as of this encounter: 1.664 m (5' 5.5\").    Weight as of this encounter: 93.4 kg (206 lb).    GEN: NAD; overweight  HEENT: Mildly dry mucous membranes. No oral lesions.  Anicteric, noninjected sclera.  Eyes appear to have good moisture by gross examination.  CV: S1, S2. RRR. No m/r/g.  PULM: CTA bilaterally. No w/c.  ABD: +BS.   MSK: MCPs, PIPs, and DIPs without swelling or tenderness to palpation.  Subtle Heberden's nodes bilaterally.  Bilateral wrists without swelling or tenderness to palpation; right wrist has limited flexion and extension and was the wrist that she recently had ORIF for distal ulna and distal radius fractures.  Elbows and shoulders without swelling or tenderness to palpation.  Hips nontender to palpation.  Bilateral knees with medial joint line tenderness and crepitation but no effusion or " increased warmth.  Ankles without swelling or tenderness to palpation.  MTPs without swelling or tenderness to palpation.  Bony hypertrophy on the bilateral dorsal mid feet.  Achilles tendons nontender to palpation.   SKIN: No rash  EXT: No LE edema  PSYCH: Alert. Appropriate.    Labs / Imaging (select studies)     ZELDA by EIA positive in 2009    RNP/Sm/SSA/SSB  Recent Labs   Lab Test 07/26/11  1227   ENASM 0   ENASSA 61*   ENASSB 0   ENARMP 0     dsDNA  Recent Labs   Lab Test 07/26/11  1227   DNA 20     C3/C4  Recent Labs   Lab Test 07/26/11  1227   C3HZKFT 131   T4ZJPMC 24     Histone Antibody  Recent Labs   Lab Test 07/26/11  1227   HSTO 0.2     IgG  Recent Labs   Lab Test 07/26/11  1227         IGM 97     CBC  Recent Labs   Lab Test 01/17/18  1024 08/07/13  1232   WBC 7.9 6.6   RBC 4.17 4.38   HGB 12.0 12.8   HCT 38.4 38.4   MCV 92 88   RDW 13.7 12.6    291   MCH 28.8 29.3   MCHC 31.3* 33.3     CK/Aldolase  Recent Labs   Lab Test 07/26/11  1227        TSH/T4  Recent Labs   Lab Test 08/14/17  0953 08/07/13  1232   TSH 2.62 2.52     Lipid Panel  Recent Labs   Lab Test 08/14/17  0953 05/28/15  0831   CHOL 197 197   TRIG 66 78   HDL 68 67   * 114   VLDL  --  16   CHOLHDLRATIO  --  2.9   NHDL 129  --      Hepatitis C  Recent Labs   Lab Test 05/18/16  0955   HCVAB Nonreactive   Assay performance characteristics have not been established for newborns,   infants, and children       Lyme ab screening  Recent Labs   Lab Test 05/18/16  0955   LYMEGM 0.06     UA  Recent Labs   Lab Test 07/26/11  1228   COLOR Yellow   APPEARANCE Clear   URINEGLC Negative   URINEBILI Negative   SG 1.020   URINEPH 8.0*   PROTEIN Negative   UROBILINOGEN 0.2   NITRITE Negative   UBLD Trace*   LEUKEST Negative   WBCU O - 2   RBCU O - 2     Urine Microscopic  Recent Labs   Lab Test 07/26/11  1228   WBCU O - 2   RBCU O - 2     Urine Protein  Recent Labs   Lab Test 07/26/11  1227   UTP 0.05   UTPG 0.08   UCRR 61      Immunization History     Immunization History   Administered Date(s) Administered     Pneumo Conj 13-V (2010&after) 01/11/2018     Pneumococcal 23 valent 10/04/2018     TD (ADULT, 7+) 08/01/2005     TDAP Vaccine (Adacel) 04/18/2012          Chart documentation done in part with Dragon Voice recognition Software. Although reviewed after completion, some word and grammatical error may remain.    Finesse Saunders MD

## 2019-02-19 NOTE — PATIENT INSTRUCTIONS
Dr. Saunders s Rheumatology Clinics  Locations Clinic Hours Telephone Number     Radha [facility]una  [address] [place] [address]     Wednesday: 7:20AM - 4:00PM  Thursday:     7:20AM - 4:00PM     Friday:          7:20AM - 11:00AM       To schedule an appointment with  Dr. Saunders,  please contact  Specialty [phone]       Radha Ricardo  [address]  [place] [address]       Monday:       7:20AM - 4:00PM        Radha Romero  [address] [place] [address]       Tuesday:      7:20AM - 4:00PM

## 2019-03-14 NOTE — RESULT ENCOUNTER NOTE
"[employer] message sent:  \"Ms. Youssef,    Labs were normal.      I recommend following up in the rheumatology clinic every 1-2 years, sooner if needed.     Sincerely,  Finesse Saunders MD  3/13/2019 10:27 PM\""

## 2019-04-25 ENCOUNTER — MYC MEDICAL ADVICE (OUTPATIENT)
Dept: FAMILY MEDICINE | Facility: CLINIC | Age: 69
End: 2019-04-25

## 2019-05-08 NOTE — PROGRESS NOTES
SUBJECTIVE:   Karis Youssef is a 69 year old female who presents to clinic today for the following   health issues:        Irregular heart beat         Duration: Noted end of Apirl.     Description (location/character/radiation): Was more frequent than previous, lasting longer.  No episodes in last week-two weeks.  Episodes would last 1.5 minutes in length     Intensity:  Moderate-due to duration.  No pain associated     Accompanying signs and symptoms: fatigue x 1 week.      History (similar episodes/previous evaluation): EKG, holter monitor, Echo    Precipitating or alleviating factors: Possibly triggered by stress, not necessarily at the time of the irregular heart rhythm     Therapies tried and outcome:  Derick chi in AM, relaxation, has recently retired.    No episodes for 1 week, has less stress and has not used sudafed for several days.  Did notice the skipped beats more when she was going to sleep.    No cp/sob or nausea associated with episodes.        Additional history: as documented    Reviewed  and updated as needed this visit by clinical staff  Tobacco  Allergies  Meds  Problems  Med Hx  Surg Hx  Fam Hx  Soc Hx          Reviewed and updated as needed this visit by Provider  Tobacco  Allergies  Meds  Problems  Med Hx  Surg Hx  Fam Hx         Patient Active Problem List   Diagnosis     Thigh pain     Osteopenia     Sjogren's syndrome (H)     CARDIOVASCULAR SCREENING; LDL GOAL LESS THAN 160     Advanced directives, counseling/discussion     Palpitations     Acute conjunctivitis     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     Choroidal nevus, left     Acute pain of left shoulder     Past Surgical History:   Procedure Laterality Date     BIOPSY  2000    right breast lumpectomy     BREAST LUMPECTOMY, RT/LT      right breast, benign     CATARACT IOL, RT/LT      bilateral     ORTHOPEDIC SURGERY  01/2018    right radius        Social History     Tobacco Use     Smoking status: Former Smoker     Last  attempt to quit: 1984     Years since quittin.3     Smokeless tobacco: Never Used     Tobacco comment: nonsmoking nadia   Substance Use Topics     Alcohol use: Yes     Comment: 1 glass of wine one/two times a week     Family History   Problem Relation Age of Onset     C.A.D. Mother      Osteoporosis Mother      Hypertension Mother      Heart Disease Mother         CAD, CHF     Coronary Artery Disease Mother      Hyperlipidemia Mother      Diabetes Father      Hypertension Father      Cancer Brother         liver     Diabetes Brother      Diabetes Sister      Breast Cancer Sister 50     Hypertension Sister      Cancer Sister 72        non-hodgkins lymphoma     Cerebrovascular Disease Maternal Grandmother      Diabetes Paternal Grandfather      Connective Tissue Disorder Other         2 cousins and niece with MS; aunt with lupus     Rheumatoid Arthritis Cousin      Diabetes Niece      Hyperlipidemia Sister      Other Cancer Brother         Liver     Other Cancer Sister         non-hodgkins lymphoma     Thyroid Disease Sister      Osteoporosis Sister          Current Outpatient Medications   Medication Sig Dispense Refill     Acetaminophen (TYLENOL PO) Take 325 mg by mouth       Calcium Carbonate-Vitamin D (CALCIUM 600 + D OR) Take by mouth daily       carboxymethylcellulose (CELLUVISC/REFRESH LIQUIGEL) 1 % ophthalmic solution Place 1 drop into both eyes 3 times daily       cetirizine (ZYRTEC) 10 MG tablet Take 10 mg by mouth daily       diclofenac (VOLTAREN) 1 % topical gel Place onto the skin 4 times daily 100 g 6     MAGNESIUM PO Take 250 mg by mouth       SUDAFED 30 MG OR TABS 1 TABLET EVERY 4 TO 6 HOURS AS NEEDED       VITAMIN C 500 MG OR TABS 1 TABLET  DAILY       VITAMIN E 400 UNIT PO CAPS 1 CAPSULE DAILY       BP Readings from Last 3 Encounters:   19 129/80   19 125/80   18 130/66    Wt Readings from Last 3 Encounters:   19 96.3 kg (212 lb 3.2 oz)   19 93.4 kg (206 lb)    11/01/18 93.4 kg (205 lb 12.8 oz)                  Labs reviewed in EPIC    ROS:  Constitutional, HEENT, cardiovascular, pulmonary, gi and gu systems are negative, except as otherwise noted.    OBJECTIVE:     /80   Pulse 76   Temp 98.4  F (36.9  C) (Oral)   Resp 16   Wt 96.3 kg (212 lb 3.2 oz)   SpO2 96%   BMI 34.77 kg/m    Body mass index is 34.77 kg/m .  GENERAL: healthy, alert and no distress  EYES: Eyes grossly normal to inspection, PERRL and conjunctivae and sclerae normal  RESP: lungs clear to auscultation - no rales, rhonchi or wheezes  CV: regular rate and rhythm, normal S1 S2, no S3 or S4, no murmur, click or rub, no peripheral edema and peripheral pulses strong  MS: no gross musculoskeletal defects noted, no edema  SKIN: no suspicious lesions or rashes  PSYCH: mentation appears normal, affect normal/bright    Diagnostic Test Results:  EKG: NSR, no block, no concerning findings, unchanged from 1/18    ASSESSMENT/PLAN:     (I49.9) Irregular heart beat  (primary encounter diagnosis)  Comment: likely due to stimulant/stress - caffeine/sudafed/retiring  Plan: EKG 12-lead complete w/read - Clinics        Monitor symptoms, if recurs/persistent, plan zio patch via ancillary nurse visit.  Watch stimulant use.       Patient Instructions       Monitor stimulant intake (sudafed/caffeine) when palpitations occur.    If persisting, notify Josee Carpenter MD via PeerApp message to plan to get Zio patch.      Josee Carpenter MD  [facility]

## 2019-05-14 ENCOUNTER — OFFICE VISIT (OUTPATIENT)
Dept: FAMILY MEDICINE | Facility: CLINIC | Age: 69
End: 2019-05-14
Payer: COMMERCIAL

## 2019-05-14 VITALS
OXYGEN SATURATION: 96 % | HEART RATE: 76 BPM | WEIGHT: 212.2 LBS | BODY MASS INDEX: 34.77 KG/M2 | DIASTOLIC BLOOD PRESSURE: 80 MMHG | TEMPERATURE: 98.4 F | SYSTOLIC BLOOD PRESSURE: 129 MMHG | RESPIRATION RATE: 16 BRPM

## 2019-05-14 DIAGNOSIS — I49.9 IRREGULAR HEART BEAT: Primary | ICD-10-CM

## 2019-05-14 PROCEDURE — 99213 OFFICE O/P EST LOW 20 MIN: CPT | Performed by: FAMILY MEDICINE

## 2019-05-14 PROCEDURE — 93000 ELECTROCARDIOGRAM COMPLETE: CPT | Performed by: FAMILY MEDICINE

## 2019-05-14 NOTE — NURSING NOTE
"Chief Complaint   Patient presents with     Irregular Heart Beat       Initial /80   Pulse 76   Temp 98.4  F (36.9  C) (Oral)   Resp 16   Wt 96.3 kg (212 lb 3.2 oz)   SpO2 96%   BMI 34.77 kg/m   Estimated body mass index is 34.77 kg/m  as calculated from the following:    Height as of 2/19/19: 1.664 m (5' 5.5\").    Weight as of this encounter: 96.3 kg (212 lb 3.2 oz).  Medication Reconciliation: complete  Pipe Best CMA    "

## 2019-05-14 NOTE — PATIENT INSTRUCTIONS
Monitor stimulant intake (sudafed/caffeine) when palpitations occur.    If persisting, notify Josee Carpenter MD via Lumate message to plan to get Zio patch.

## 2019-09-28 ENCOUNTER — HEALTH MAINTENANCE LETTER (OUTPATIENT)
Age: 69
End: 2019-09-28

## 2019-10-03 NOTE — PATIENT INSTRUCTIONS
Patient Education   Personalized Prevention Plan  You are due for the preventive services outlined below.  Your care team is available to assist you in scheduling these services.  If you have already completed any of these items, please share that information with your care team to update in your medical record.  Health Maintenance Due   Topic Date Due     Zoster (Shingles) Vaccine (1 of 2) 04/19/2002     Annual Wellness Visit  08/14/2018     FALL RISK ASSESSMENT  10/04/2019     Mammogram  10/02/2019     FIT Test  10/16/2019

## 2019-10-08 ENCOUNTER — OFFICE VISIT (OUTPATIENT)
Dept: FAMILY MEDICINE | Facility: CLINIC | Age: 69
End: 2019-10-08
Payer: COMMERCIAL

## 2019-10-08 VITALS
WEIGHT: 203.2 LBS | HEIGHT: 66 IN | BODY MASS INDEX: 32.66 KG/M2 | HEART RATE: 80 BPM | DIASTOLIC BLOOD PRESSURE: 73 MMHG | SYSTOLIC BLOOD PRESSURE: 120 MMHG | RESPIRATION RATE: 20 BRPM | TEMPERATURE: 98.1 F

## 2019-10-08 DIAGNOSIS — Z12.31 ENCOUNTER FOR SCREENING MAMMOGRAM FOR BREAST CANCER: ICD-10-CM

## 2019-10-08 DIAGNOSIS — Z12.11 SPECIAL SCREENING FOR MALIGNANT NEOPLASMS, COLON: ICD-10-CM

## 2019-10-08 DIAGNOSIS — R25.2 MUSCLE CRAMP, NOCTURNAL: ICD-10-CM

## 2019-10-08 DIAGNOSIS — Z00.00 ENCOUNTER FOR MEDICARE ANNUAL WELLNESS EXAM: Primary | ICD-10-CM

## 2019-10-08 DIAGNOSIS — D48.5 NEOPLASM OF UNCERTAIN BEHAVIOR OF SKIN OF FACE: ICD-10-CM

## 2019-10-08 PROBLEM — S52.131A: Status: ACTIVE | Noted: 2018-01-11

## 2019-10-08 LAB
CHOLEST SERPL-MCNC: 232 MG/DL
GLUCOSE SERPL-MCNC: 106 MG/DL (ref 70–99)
HDLC SERPL-MCNC: 66 MG/DL
LDLC SERPL CALC-MCNC: 153 MG/DL
MAGNESIUM SERPL-MCNC: 2.2 MG/DL (ref 1.6–2.3)
NONHDLC SERPL-MCNC: 166 MG/DL
TRIGL SERPL-MCNC: 66 MG/DL

## 2019-10-08 PROCEDURE — 83735 ASSAY OF MAGNESIUM: CPT | Performed by: FAMILY MEDICINE

## 2019-10-08 PROCEDURE — 36415 COLL VENOUS BLD VENIPUNCTURE: CPT | Performed by: FAMILY MEDICINE

## 2019-10-08 PROCEDURE — 99397 PER PM REEVAL EST PAT 65+ YR: CPT | Performed by: FAMILY MEDICINE

## 2019-10-08 PROCEDURE — 82947 ASSAY GLUCOSE BLOOD QUANT: CPT | Performed by: FAMILY MEDICINE

## 2019-10-08 PROCEDURE — 80061 LIPID PANEL: CPT | Performed by: FAMILY MEDICINE

## 2019-10-08 ASSESSMENT — ENCOUNTER SYMPTOMS
WEAKNESS: 0
SHORTNESS OF BREATH: 0
DYSURIA: 0
JOINT SWELLING: 1
DIARRHEA: 0
BREAST MASS: 0
MYALGIAS: 0
PARESTHESIAS: 0
CONSTIPATION: 0
NAUSEA: 0
DIZZINESS: 0
HEMATURIA: 0
ABDOMINAL PAIN: 0
FREQUENCY: 0
PALPITATIONS: 0
ARTHRALGIAS: 1
EYE PAIN: 0
NERVOUS/ANXIOUS: 0
CHILLS: 0
HEADACHES: 0
SORE THROAT: 0
COUGH: 0
FEVER: 0
HEMATOCHEZIA: 0
HEARTBURN: 0

## 2019-10-08 ASSESSMENT — MIFFLIN-ST. JEOR: SCORE: 1465.52

## 2019-10-08 ASSESSMENT — ACTIVITIES OF DAILY LIVING (ADL): CURRENT_FUNCTION: NO ASSISTANCE NEEDED

## 2019-10-08 NOTE — PROGRESS NOTES
"SUBJECTIVE:   Karis Youssef is a 69 year old female who presents for Preventive Visit.      Are you in the first 12 months of your Medicare coverage?  No    Healthy Habits:     In general, how would you rate your overall health?  Good    Frequency of exercise:  4-5 days/week    Duration of exercise:  30-45 minutes    Do you usually eat at least 4 servings of fruit and vegetables a day, include whole grains    & fiber and avoid regularly eating high fat or \"junk\" foods?  Yes    Taking medications regularly:  Yes    Medication side effects:  None    Ability to successfully perform activities of daily living:  No assistance needed    Home Safety:  No safety concerns identified    Hearing Impairment:  No hearing concerns    In the past 6 months, have you been bothered by leaking of urine?  No    In general, how would you rate your overall mental or emotional health?  Excellent      PHQ-2 Total Score: 0    Additional concerns today:  No    Do you feel safe in your environment? Yes    Do you have a Health Care Directive? No: Advance care planning reviewed with patient; information given to patient to review.      Fall risk  Fallen 2 or more times in the past year?: No  Any fall with injury in the past year?: No    Cognitive Screening   1) Repeat 3 items (Leader, Season, Table)    2) Clock draw: NORMAL  3) 3 item recall: Recalls 3 objects  Results: NORMAL clock, 1-2 items recalled: COGNITIVE IMPAIRMENT LESS LIKELY    Mini-CogTM Copyright JOSHUA Pabon. Licensed by the author for use in [facility]; reprinted with permission ([place]@.[facility]). All rights reserved.      Do you have sleep apnea, excessive snoring or daytime drowsiness?: no    Reviewed and updated as needed this visit by clinical staff  Tobacco  Allergies  Meds  Med Hx  Surg Hx  Fam Hx  Soc Hx        Reviewed and updated as needed this visit by Provider        Social History     Tobacco Use     Smoking status: Former Smoker     Last attempt to quit: " 1984     Years since quittin.7     Smokeless tobacco: Never Used     Tobacco comment: nonsmoking [email or web address]   Substance Use Topics     Alcohol use: Yes     Comment: 1 glass of wine one/two times a week         Alcohol Use 10/8/2019   Prescreen: >3 drinks/day or >7 drinks/week? No   Prescreen: >3 drinks/day or >7 drinks/week? -               Current providers sharing in care for this patient include:   Patient Care Team:  Josee Carpenter MD as PCP - General (Family Practice)  Josee Carpenter MD as Assigned PCP    The following health maintenance items are reviewed in Epic and correct as of today:  Health Maintenance   Topic Date Due     ZOSTER IMMUNIZATION (1 of 2) 2002     MEDICARE ANNUAL WELLNESS VISIT  2018     FALL RISK ASSESSMENT  10/04/2019     MAMMO SCREENING  10/02/2019     FIT  10/16/2019     DTAP/TDAP/TD IMMUNIZATION (3 - Td) 2022     LIPID  2022     ADVANCE CARE PLANNING  10/04/2023     DEXA  Completed     HEPATITIS C SCREENING  Completed     PHQ-2  Completed     PNEUMOCOCCAL IMMUNIZATION 65+ LOW/MEDIUM RISK  Completed     INFLUENZA VACCINE  Addressed     IPV IMMUNIZATION  Aged Out     MENINGITIS IMMUNIZATION  Aged Out   G 2 P 2   No LMP recorded. Patient is postmenopausal.     Fasting: Yes    Td: tdap        NO - age 65 - see link Cervical Cytology Screening Guidelines               Cholesterol:   Lab Results   Component Value Date    CHOL 197 2017     Lab Results   Component Value Date    HDL 68 2017     Lab Results   Component Value Date     2017     Lab Results   Component Value Date    TRIG 66 2017     Lab Results   Component Value Date    CHOLHDLRATIO 2.9 2015         MMG: 10/17 due  Dexa:  10/17     Flex/colo: FIT 10/18 due      Seat Belt: Yes    Sunscreen use: Yes   Calcium Intake: adeq   Health Care Directive: Yes   Sexually Active: No    Current contraception: none  History of abnormal Pap smear: Yes: see  pmh  Family history of colon/breast/ovarian cancer: Yes: see [facility]  Regular self breast exam: Yes  History of abnormal mammogram: Yes: see reports        Labs reviewed in EPIC  BP Readings from Last 3 Encounters:   10/08/19 120/73   19 129/80   19 125/80    Wt Readings from Last 3 Encounters:   10/08/19 92.2 kg (203 lb 3.2 oz)   19 96.3 kg (212 lb 3.2 oz)   19 93.4 kg (206 lb)                  Patient Active Problem List   Diagnosis     Thigh pain     Osteopenia     Sjogren's syndrome (H)     CARDIOVASCULAR SCREENING; LDL GOAL LESS THAN 160     Advanced directives, counseling/discussion     Palpitations     Acute conjunctivitis     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     Choroidal nevus, left     Acute pain of left shoulder     Displaced fracture of neck of right radius     Past Surgical History:   Procedure Laterality Date     BIOPSY      right breast lumpectomy     BREAST LUMPECTOMY, RT/LT      right breast, benign     CATARACT IOL, RT/LT      bilateral     ORTHOPEDIC SURGERY  2018    right radius        Social History     Tobacco Use     Smoking status: Former Smoker     Last attempt to quit: 1984     Years since quittin.7     Smokeless tobacco: Never Used     [facility]   Substance Use Topics     Alcohol use: Yes     Comment: 1 glass of wine one/two times a week     Family History   Problem Relation Age of Onset     C.A.D. Mother      Osteoporosis Mother      Hypertension Mother      Heart Disease Mother         CAD, CHF     Coronary Artery Disease Mother      Hyperlipidemia Mother      Diabetes Father      Hypertension Father      Cancer Brother         liver     Diabetes Brother      Diabetes Sister      Breast Cancer Sister 50     Hypertension Sister      Cancer Sister 72        non-hodgkins lymphoma     Cerebrovascular Disease Maternal Grandmother      Diabetes Paternal Grandfather      Connective Tissue Disorder Other         2 cousins and  niece with MS; aunt with lupus     Rheumatoid Arthritis Cousin      Diabetes Niece      Hyperlipidemia Sister      Other Cancer Brother         Liver     Other Cancer Sister         non-hodgkins lymphoma     Thyroid Disease Sister      Osteoporosis Sister          Current Outpatient Medications   Medication Sig Dispense Refill     Acetaminophen (TYLENOL PO) Take 325 mg by mouth       Calcium Carbonate-Vitamin D (CALCIUM 600 + D OR) Take by mouth daily       carboxymethylcellulose (CELLUVISC/REFRESH LIQUIGEL) 1 % ophthalmic solution Place 1 drop into both eyes 3 times daily       cetirizine (ZYRTEC) 10 MG tablet Take 10 mg by mouth daily       diclofenac (VOLTAREN) 1 % topical gel Place onto the skin 4 times daily 100 g 6     MAGNESIUM PO Take 250 mg by mouth daily        SUDAFED 30 MG OR TABS 1 TABLET EVERY 4 TO 6 HOURS AS NEEDED       VITAMIN C 500 MG OR TABS 1 TABLET  DAILY       VITAMIN E 400 UNIT PO CAPS 1 CAPSULE DAILY       Pneumonia Vaccine:completed  Mammogram Screening: Mammogram Screening: Patient over age 50, mutual decision to screen reflected in health maintenance.  Shingrix: recommended    Review of Systems   Constitutional: Negative for chills and fever.   HENT: Negative for congestion, ear pain, hearing loss and sore throat.    Eyes: Negative for pain and visual disturbance.   Respiratory: Negative for cough and shortness of breath.    Cardiovascular: Positive for peripheral edema. Negative for chest pain and palpitations.   Gastrointestinal: Negative for abdominal pain, constipation, diarrhea, heartburn, hematochezia and nausea.   Breasts:  Negative for tenderness, breast mass and discharge.   Genitourinary: Negative for dysuria, frequency, genital sores, hematuria, pelvic pain, vaginal bleeding and vaginal discharge.   Musculoskeletal: Positive for arthralgias and joint swelling. Negative for myalgias.   Skin: Negative for rash.   Neurological: Negative for dizziness, weakness, headaches and  "paresthesias.   Psychiatric/Behavioral: Negative for mood changes. The patient is not nervous/anxious.          OBJECTIVE:   /73   Pulse 80   Temp 98.1  F (36.7  C) (Oral)   Resp 20   Ht 1.664 m (5' 5.5\")   Wt 92.2 kg (203 lb 3.2 oz)   BMI 33.30 kg/m   Estimated body mass index is 33.3 kg/m  as calculated from the following:    Height as of this encounter: 1.664 m (5' 5.5\").    Weight as of this encounter: 92.2 kg (203 lb 3.2 oz).  Physical Exam  GENERAL APPEARANCE: healthy, alert and no distress  EYES: Eyes grossly normal to inspection, PERRL and conjunctivae and sclerae normal  HENT: ear canals and TM's normal, nose and mouth without ulcers or lesions, oropharynx clear and oral mucous membranes moist  NECK: no adenopathy, no asymmetry, masses, or scars and thyroid normal to palpation  RESP: lungs clear to auscultation - no rales, rhonchi or wheezes  BREAST: normal without masses, tenderness or nipple discharge and no palpable axillary masses or adenopathy  CV: regular rate and rhythm, normal S1 S2, no S3 or S4, no murmur, click or rub, no peripheral edema and peripheral pulses strong  ABDOMEN: soft, nontender, no hepatosplenomegaly, no masses and bowel sounds normal  MS: no musculoskeletal defects are noted and gait is age appropriate without ataxia  SKIN: no suspicious lesions or rashes, 5 mm flat erythematous lesion with keratotic scale on nose, left nostril  NEURO: Normal strength and tone, sensory exam grossly normal, mentation intact and speech normal  PSYCH: mentation appears normal and affect normal/bright    Diagnostic Test Results:  Labs reviewed in Epic    ASSESSMENT / PLAN:   (Z00.00) Encounter for Medicare annual wellness exam  (primary encounter diagnosis)  Comment: preventive needs reviewed   Plan: Lipid panel reflex to direct LDL Fasting,         Glucose        see orders in Epic.     (D48.5) Neoplasm of uncertain behavior of skin of face  Comment: likely AK  Plan: DERMATOLOGY REFERRAL   " "     Refer to derm for evaluation    (Z12.31) Encounter for screening mammogram for breast cancer  Comment: due  Plan: MA SCREENING DIGITAL BILAT - Future  (s+30)          (Z12.11) Special screening for malignant neoplasms, colon  Comment: due  Plan: will do cologuard, form completed    (R25.2) Muscle cramp, nocturnal  Comment: requested  Plan: Magnesium              End of Life Planning:  Patient currently has an advanced directive: Yes.  Practitioner is supportive of decision.    COUNSELING:  Reviewed preventive health counseling, as reflected in patient instructions  Special attention given to:       Regular exercise       Healthy diet/nutrition    Estimated body mass index is 33.3 kg/m  as calculated from the following:    Height as of this encounter: 1.664 m (5' 5.5\").    Weight as of this encounter: 92.2 kg (203 lb 3.2 oz).    Weight management plan: Discussed healthy diet and exercise guidelines     reports that she quit smoking about 35 years ago. She has never used smokeless tobacco.      Appropriate preventive services were discussed with this patient, including applicable screening as appropriate for cardiovascular disease, diabetes, osteopenia/osteoporosis, and glaucoma.  As appropriate for age/gender, discussed screening for colorectal cancer, prostate cancer, breast cancer, and cervical cancer. Checklist reviewing preventive services available has been given to the patient.    Reviewed patients plan of care and provided an AVS. The Basic Care Plan (routine screening as documented in Health Maintenance) for Karis meets the Care Plan requirement. This Care Plan has been established and reviewed with the Patient.    Counseling Resources:  ATP IV Guidelines  Pooled Cohorts Equation Calculator  Breast Cancer Risk Calculator  FRAX Risk Assessment  ICSI Preventive Guidelines  Dietary Guidelines for Americans, 2010  USDA's MyPlate  ASA Prophylaxis  Lung CA Screening    Josee Carpenter MD  [facility] " ANDVIKTORIA    Identified Health Risks:lipi

## 2019-10-08 NOTE — PROGRESS NOTES
G 2 P 2   No LMP recorded. Patient is postmenopausal.     Fasting: {YES:858080}   Td: tdap 2012       NO - age 65 - see link Cervical Cytology Screening Guidelines               Cholesterol:   Lab Results   Component Value Date    CHOL 197 08/14/2017     Lab Results   Component Value Date    HDL 68 08/14/2017     Lab Results   Component Value Date     08/14/2017     Lab Results   Component Value Date    TRIG 66 08/14/2017     Lab Results   Component Value Date    CHOLHDLRATIO 2.9 05/28/2015         MMG: 10/17 due  Dexa:  10/17     Flex/colo: FIT 10/18 due      Seat Belt: {YES:411549}   Sunscreen use: {YES:016606}  Calcium Intake: ***   Health Care Directive: Yes   Sexually Active: No    Current contraception: none  History of abnormal Pap smear: Yes: see pm  Family history of colon/breast/ovarian cancer: Yes: see [facility]  Regular self breast exam: Yes  History of abnormal mammogram: Yes: see reports

## 2019-10-08 NOTE — NURSING NOTE
"Chief Complaint   Patient presents with     Wellness Visit       Initial /73   Pulse 80   Temp 98.1  F (36.7  C) (Oral)   Resp 20   Ht 1.664 m (5' 5.5\")   Wt 92.2 kg (203 lb 3.2 oz)   BMI 33.30 kg/m   Estimated body mass index is 33.3 kg/m  as calculated from the following:    Height as of this encounter: 1.664 m (5' 5.5\").    Weight as of this encounter: 92.2 kg (203 lb 3.2 oz).  Medication Reconciliation: complete  Pipe Best CMA    "

## 2019-10-14 ENCOUNTER — TRANSFERRED RECORDS (OUTPATIENT)
Dept: HEALTH INFORMATION MANAGEMENT | Facility: CLINIC | Age: 69
End: 2019-10-14

## 2019-10-14 LAB — COLOGUARD-ABSTRACT: NEGATIVE

## 2019-10-18 ENCOUNTER — TELEPHONE (OUTPATIENT)
Dept: FAMILY MEDICINE | Facility: CLINIC | Age: 69
End: 2019-10-18

## 2019-10-18 NOTE — TELEPHONE ENCOUNTER
Negative Cologuard results received, Placed in providers basket.  Letter sent to patient, faxed to stat scanning.  JIMMIE Galeano

## 2019-10-18 NOTE — LETTER
October 18, 2019      Karis Youssef  [address] [place] [address]      Dear Karis,    The result of your recent Cologuard testing was negative. A negative result means that Cologuard did not detect significant levels of DNA and/or hemoglobin biomarkers in the stool which are associated with colon cancer or precancer.    Thank you for completing your screening, your next screening should be completed in 3 years.      If you have any questions or concerns, please contact your care team at [phone].  Sincerely,  [facility].

## 2019-11-01 ENCOUNTER — ANCILLARY PROCEDURE (OUTPATIENT)
Dept: MAMMOGRAPHY | Facility: CLINIC | Age: 69
End: 2019-11-01
Payer: COMMERCIAL

## 2019-11-01 DIAGNOSIS — Z12.31 ENCOUNTER FOR SCREENING MAMMOGRAM FOR BREAST CANCER: ICD-10-CM

## 2019-11-01 PROCEDURE — 77063 BREAST TOMOSYNTHESIS BI: CPT | Mod: TC

## 2019-11-01 PROCEDURE — 77067 SCR MAMMO BI INCL CAD: CPT | Mod: TC

## 2019-11-21 ENCOUNTER — OFFICE VISIT (OUTPATIENT)
Dept: DERMATOLOGY | Facility: CLINIC | Age: 69
End: 2019-11-21
Payer: COMMERCIAL

## 2019-11-21 VITALS — DIASTOLIC BLOOD PRESSURE: 77 MMHG | OXYGEN SATURATION: 98 % | HEART RATE: 86 BPM | SYSTOLIC BLOOD PRESSURE: 141 MMHG

## 2019-11-21 DIAGNOSIS — L57.0 ACTINIC KERATOSIS: Primary | ICD-10-CM

## 2019-11-21 PROCEDURE — 99213 OFFICE O/P EST LOW 20 MIN: CPT | Performed by: PHYSICIAN ASSISTANT

## 2019-11-21 NOTE — LETTER
11/21/2019         RE: Karis Youssef  [address]  [place] [address]        Dear Colleague,    Thank you for referring your patient, Karis Youssef, to the [facility]. Please see a copy of my visit note below.    HPI:   Chief complaints: Karis Youssef is a 69 year old female who presents for evaluation of a non healing spot on the nose  Condition present for:  Over 1 year.   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 Doing an escape room tonight with old weekend crew from the     Review Of Systems  Eyes: negative  Ears/Nose/Throat: negative  Respiratory: No shortness of breath, dyspnea on exertion, cough, or hemoptysis  Cardiovascular: negative  Gastrointestinal: negative  Genitourinary: negative  Musculoskeletal: negative  Neurologic: negative  Psychiatric: negative  Skin: positive for lesion      PHYSICAL EXAM:    BP (!) 141/77   Pulse 86   SpO2 98%   Skin exam performed as follows: Type 2 skin. Mood appropriate  Alert and Oriented X 3. Well developed, well nourished in no distress.  General appearance: Normal  Head including face: Normal  Eyes: conjunctiva and lids: Normal  Mouth: Lips, teeth, gums: Normal  Neck: Normal  Chest-breast/axillae: Normal  Back: Normal  Spleen and liver: Normal  Cardiovascular: Exam of peripheral vascular system by observation for swelling, varicosities, edema: Normal  Genitalia: groin, buttocks: Normal  Extremities: digits/nails (clubbing): Normal  Eccrine and Apocrine glands: Normal  Right upper extremity: Normal  Left upper extremity: Normal  Right lower extremity: Normal  Left lower extremity: Normal  Skin: Scalp and body hair: See below    1. Pink gritty papule on the left nasal bridge x 1    ASSESSMENT/PLAN:     1. Actinic keratosis on the left nasal bridge - advised. Has an event tonight - would like to hold off on treatment. She prefers to  hold off until the spring.   2. Discussed recommendation for a yearly FSE - she will schedule this for the spring   3. Karis to follow up with Primary Care provider regarding elevated blood pressure.        Follow-up: Spring 2020 for FSE and treatment of AK  CC:   Scribed By: Lucy Gonzalez, MS, PA-C        Again, thank you for allowing me to participate in the care of your patient.        Sincerely,        Lucy Gonzalez PA-C

## 2019-11-21 NOTE — PROGRESS NOTES
HPI:   Chief complaints: Karis Youssef is a 69 year old female who presents for evaluation of a non healing spot on the nose  Condition present for:  Over 1 year.   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 Mood appropriate  Alert and Oriented X 3. Well developed, well nourished in no distress.  General appearance: Normal  Head including face: Normal  Eyes: conjunctiva and lids: Normal  Mouth: Lips, teeth, gums: Normal  Neck: Normal  Chest-breast/axillae: Normal  Back: Normal  Spleen and liver: Normal  Cardiovascular: Exam of peripheral vascular system by observation for swelling, varicosities, edema: Normal  Genitalia: groin, buttocks: Normal  Extremities: digits/nails (clubbing): Normal  Eccrine and Apocrine glands: Normal  Right upper extremity: Normal  Left upper extremity: Normal  Right lower extremity: Normal  Left lower extremity: Normal  Skin: Scalp and body hair: See below    1. Pink gritty papule on the left nasal bridge x 1    ASSESSMENT/PLAN:     1. Actinic keratosis on the left nasal bridge - advised. Has an event tonight - would like to hold off on treatment. She prefers to hold off until the spring.   2. Discussed recommendation for a yearly FSE - she will schedule this for the spring   3. Karis to follow up with Primary Care provider regarding elevated blood pressure.        Follow-up: Spring 2020 for FSE and  treatment of AK  CC:   Scribed By: Lucy Gonzalez, MS, PA-C

## 2019-11-21 NOTE — NURSING NOTE
Chief Complaint   Patient presents with     Derm Problem     spot on nose       Vitals:    11/21/19 1406   BP: (!) 141/77   Pulse: 86   SpO2: 98%     Wt Readings from Last 1 Encounters:   10/08/19 92.2 kg (203 lb 3.2 oz)       Adriana Joy LPN.................11/21/2019

## 2020-10-06 ENCOUNTER — MYC MEDICAL ADVICE (OUTPATIENT)
Dept: FAMILY MEDICINE | Facility: CLINIC | Age: 70
End: 2020-10-06

## 2020-12-10 NOTE — PROGRESS NOTES
"SUBJECTIVE:   Karis Youssef is a 70 year old female who presents for Preventive Visit.      Patient has been advised of split billing requirements and indicates understanding: Yes   Are you in the first 12 months of your Medicare coverage?  No    Healthy Habits:     In general, how would you rate your overall health?  Good    Frequency of exercise:  2-3 days/week    Duration of exercise:  45-60 minutes    Do you usually eat at least 4 servings of fruit and vegetables a day, include whole grains    & fiber and avoid regularly eating high fat or \"junk\" foods?  Yes    Taking medications regularly:  Yes    Medication side effects:  Other    Ability to successfully perform activities of daily living:  No assistance needed    Home Safety:  Lack of grab bars in the bathroom    Hearing Impairment:  No hearing concerns    In the past 6 months, have you been bothered by leaking of urine?  No    In general, how would you rate your overall mental or emotional health?  Good      PHQ-2 Total Score: 0    Additional concerns today:  No    Do you feel safe in your environment? Yes    Have you ever done Advance Care Planning? (For example, a Health Directive, POLST, or a discussion with a medical provider or your loved ones about your wishes): Declines information today.  Has information at home .         Fall risk  Fallen 2 or more times in the past year?: No  Any fall with injury in the past year?: No    Cognitive Screening   1) Repeat 3 items (Leader, Season, Table)    2) Clock draw: NORMAL  3) 3 item recall: Recalls 3 objects  Results: NORMAL clock, 1-2 items recalled: COGNITIVE IMPAIRMENT LESS LIKELY    Mini-CogTM Copyright JOSHUA Pabon. Licensed by the author for use in [facility]; reprinted with permission ([place]@.[facility]). All rights reserved.      Do you have sleep apnea, excessive snoring or daytime drowsiness?: no    Reviewed and updated as needed this visit by clinical staff  Tobacco  Allergies  Meds  Problems  " Med Hx  Surg Hx  Fam Hx  Soc Hx          Reviewed and updated as needed this visit by Provider  Tobacco  Allergies  Meds  Problems  Med Hx  Surg Hx  Fam Hx         Social History     Tobacco Use     Smoking status: Former Smoker     Quit date: 1984     Years since quittin.9     Smokeless tobacco: Never Used     Tobacco comment: nonsmoking kirkashleenichol   Substance Use Topics     Alcohol use: Yes     Comment: 1 glass of wine one/two times a week         Alcohol Use 2020   Prescreen: >3 drinks/day or >7 drinks/week? No   Prescreen: >3 drinks/day or >7 drinks/week? -             Current providers sharing in care for this patient include:   Patient Care Team:  Josee Carpenter MD as PCP - General (Family Practice)  Josee Carpenter MD as Assigned PCP    The following health maintenance items are reviewed in Epic and correct as of today:  Health Maintenance   Topic Date Due     ZOSTER IMMUNIZATION (1 of 2) 2002     FALL RISK ASSESSMENT  10/08/2020     MAMMO SCREENING  2021     MEDICARE ANNUAL WELLNESS VISIT  2021     DTAP/TDAP/TD IMMUNIZATION (3 - Td) 2022     ADVANCE CARE PLANNING  10/04/2023     LIPID  10/08/2024     COLORECTAL CANCER SCREENING  10/14/2029     DEXA  10/02/2032     HEPATITIS C SCREENING  Completed     PHQ-2  Completed     Pneumococcal Vaccine: 65+ Years  Completed     INFLUENZA VACCINE  Addressed     Pneumococcal Vaccine: Pediatrics (0 to 5 Years) and At-Risk Patients (6 to 64 Years)  Aged Out     IPV IMMUNIZATION  Aged Out     MENINGITIS IMMUNIZATION  Aged Out     HEPATITIS B IMMUNIZATION  Aged Out     G 2 P 2   No LMP recorded. Patient is postmenopausal.     Fasting: Yes    Td: tdap        Status post benign hysterectomy. Health Maintenance and Surgical History updated.               Cholesterol:   Lab Results   Component Value Date    CHOL 232 10/08/2019     Lab Results   Component Value Date    HDL 66 10/08/2019     Lab Results   Component  Value Date     10/08/2019     Lab Results   Component Value Date    TRIG 66 10/08/2019     Lab Results   Component Value Date    CHOLHDLRATIO 2.9 2015         MM/19  Dexa:  2017 penia due for 2yr check     Flex/colo: cologuard 10/2019      Seat Belt: Yes    Sunscreen use: Yes   Calcium Intake: adeq  Health Care Directive: Yes   Sexually Active: No    Current contraception: none  History of abnormal Pap smear: Yes: see pmh  Family history of colon/breast/ovarian cancer: Yes: see [facility]  Regular self breast exam: Yes  History of abnormal mammogram: Yes: see reports      Labs reviewed in EPIC  BP Readings from Last 3 Encounters:   20 122/82   19 (!) 141/77   10/08/19 120/73    Wt Readings from Last 3 Encounters:   20 99.9 kg (220 lb 3.2 oz)   10/08/19 92.2 kg (203 lb 3.2 oz)   19 96.3 kg (212 lb 3.2 oz)                  Patient Active Problem List   Diagnosis     Thigh pain     Osteopenia     Sjogren's syndrome (H)     CARDIOVASCULAR SCREENING; LDL GOAL LESS THAN 160     Advanced directives, counseling/discussion     Palpitations     Acute conjunctivitis     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     Choroidal nevus, left     Acute pain of left shoulder     Displaced fracture of neck of right radius     Past Surgical History:   Procedure Laterality Date     BIOPSY      right breast lumpectomy     BREAST LUMPECTOMY, RT/LT      right breast, benign     CATARACT IOL, RT/LT      bilateral     ORTHOPEDIC SURGERY  2018    right radius        Social History     Tobacco Use     Smoking status: Former Smoker     Quit date: 1984     Years since quittin.9     Smokeless tobacco: Never Used     [facility]   Substance Use Topics     Alcohol use: Yes     Comment: 1 glass of wine one/two times a week     Family History   Problem Relation Age of Onset     C.A.D. Mother      Osteoporosis Mother      Hypertension Mother      Heart Disease Mother         CAD,  CHF     Coronary Artery Disease Mother      Hyperlipidemia Mother      Diabetes Father      Hypertension Father      Cancer Brother         liver     Diabetes Brother      Diabetes Sister      Breast Cancer Sister 50     Hypertension Sister      Cancer Sister 72        non-hodgkins lymphoma     Cerebrovascular Disease Maternal Grandmother      Diabetes Paternal Grandfather      Diabetes Daughter         type 2     Connective Tissue Disorder Other         2 cousins and niece with MS; aunt with lupus     Rheumatoid Arthritis Cousin      Diabetes Niece      Hyperlipidemia Sister      Other Cancer Brother         Liver     Other Cancer Sister         non-hodgkins lymphoma     Thyroid Disease Sister      Osteoporosis Sister          Current Outpatient Medications   Medication Sig Dispense Refill     Acetaminophen (TYLENOL PO) Take 325 mg by mouth       Calcium Carbonate-Vitamin D (CALCIUM 600 + D OR) Take by mouth daily       carboxymethylcellulose (CELLUVISC/REFRESH LIQUIGEL) 1 % ophthalmic solution Place 1 drop into both eyes 3 times daily       cetirizine (ZYRTEC) 10 MG tablet Take 10 mg by mouth daily       diclofenac (VOLTAREN) 1 % topical gel Place onto the skin 4 times daily 100 g 6     MAGNESIUM PO Take 250 mg by mouth daily        SUDAFED 30 MG OR TABS 1 TABLET EVERY 4 TO 6 HOURS AS NEEDED       VITAMIN C 500 MG OR TABS 1 TABLET  DAILY       VITAMIN E 400 UNIT PO CAPS 1 CAPSULE DAILY       Pneumonia Vaccine:completed  Mammogram Screening: Mammogram Screening: Patient over age 50, mutual decision to screen reflected in health maintenance.  Shingrix: recommended  Flu: declined    Review of Systems   Constitutional: Negative for chills and fever.   HENT: Negative for congestion, ear pain, hearing loss and sore throat.    Eyes: Negative for pain and visual disturbance.   Respiratory: Negative for cough and shortness of breath.    Cardiovascular: Negative for chest pain, palpitations and peripheral edema.  "  Gastrointestinal: Negative for abdominal pain, constipation, diarrhea, heartburn, hematochezia and nausea.   Breasts:  Negative for tenderness, breast mass and discharge.   Genitourinary: Negative for dysuria, frequency, genital sores, hematuria, pelvic pain, urgency, vaginal bleeding and vaginal discharge.   Musculoskeletal: Negative for arthralgias, joint swelling and myalgias.   Skin: Negative for rash.   Neurological: Negative for dizziness, weakness, headaches and paresthesias.   Psychiatric/Behavioral: Negative for mood changes. The patient is not nervous/anxious.          OBJECTIVE:   /82   Pulse 75   Temp 96.8  F (36  C) (Tympanic)   Resp 20   Ht 1.651 m (5' 5\")   Wt 99.9 kg (220 lb 3.2 oz)   SpO2 96%   BMI 36.64 kg/m   Estimated body mass index is 36.64 kg/m  as calculated from the following:    Height as of this encounter: 1.651 m (5' 5\").    Weight as of this encounter: 99.9 kg (220 lb 3.2 oz).  Physical Exam  GENERAL APPEARANCE: healthy, alert and no distress  EYES: Eyes grossly normal to inspection, PERRL and conjunctivae and sclerae normal  HENT: ear canals and TM's normal, nose and mouth without ulcers or lesions, oropharynx clear and oral mucous membranes moist  NECK: no adenopathy, no asymmetry, masses, or scars and thyroid normal to palpation  RESP: lungs clear to auscultation - no rales, rhonchi or wheezes  BREAST: normal without masses, tenderness or nipple discharge and no palpable axillary masses or adenopathy  CV: regular rate and rhythm, normal S1 S2, no S3 or S4, no murmur, click or rub, no peripheral edema and peripheral pulses strong  ABDOMEN: soft, nontender, no hepatosplenomegaly, no masses and bowel sounds normal  MS: no musculoskeletal defects are noted and gait is age appropriate without ataxia  SKIN: no suspicious lesions or rashes  NEURO: Normal strength and tone, sensory exam grossly normal, mentation intact and speech normal  PSYCH: mentation appears normal and " "affect normal/bright    Diagnostic Test Results:  Labs reviewed in Epic    ASSESSMENT / PLAN:   (Z00.00) Encounter for Medicare annual wellness exam  (primary encounter diagnosis)  Comment: preventive needs reviewed   Plan: Lipid panel reflex to direct LDL Fasting,         **Glucose FUTURE anytime        see orders in Epic.     (M35.01) Sjogren's syndrome with keratoconjunctivitis sicca (H)  Comment: stable  Plan: follows with rheum when symptomatic    (E28.39) Estrogen deficiency  (M85.80) Osteopenia, unspecified location  Comment: due for dexa  Plan: DX Hip/Pelvis/Spine            Patient has been advised of split billing requirements and indicates understanding: Yes  COUNSELING:  Reviewed preventive health counseling, as reflected in patient instructions  Special attention given to:       Regular exercise       Healthy diet/nutrition    Estimated body mass index is 36.64 kg/m  as calculated from the following:    Height as of this encounter: 1.651 m (5' 5\").    Weight as of this encounter: 99.9 kg (220 lb 3.2 oz).    Weight management plan: Discussed healthy diet and exercise guidelines    She reports that she quit smoking about 36 years ago. She has never used smokeless tobacco.      Appropriate preventive services were discussed with this patient, including applicable screening as appropriate for cardiovascular disease, diabetes, osteopenia/osteoporosis, and glaucoma.  As appropriate for age/gender, discussed screening for colorectal cancer, prostate cancer, breast cancer, and cervical cancer. Checklist reviewing preventive services available has been given to the patient.    Reviewed patients plan of care and provided an AVS. The Basic Care Plan (routine screening as documented in Health Maintenance) for Karis meets the Care Plan requirement. This Care Plan has been established and reviewed with the Patient.    Counseling Resources:  ATP IV Guidelines  Pooled Cohorts Equation Calculator  Breast Cancer Risk " Calculator  Breast Cancer: Medication to Reduce Risk  FRAX Risk Assessment  ICSI Preventive Guidelines  Dietary Guidelines for Americans, 2010  [employer]'s MyPlate  ASA Prophylaxis  Lung CA Screening    Josee Carpenter MD  [facility]    Identified Health Risks:

## 2020-12-10 NOTE — PATIENT INSTRUCTIONS
Patient Education   Personalized Prevention Plan  You are due for the preventive services outlined below.  Your care team is available to assist you in scheduling these services.  If you have already completed any of these items, please share that information with your care team to update in your medical record.  Health Maintenance Due   Topic Date Due     Zoster (Shingles) Vaccine (1 of 2) 04/19/2002     PHQ-2  01/01/2020     Flu Vaccine (1) 09/01/2020     FALL RISK ASSESSMENT  10/08/2020

## 2020-12-14 ENCOUNTER — OFFICE VISIT (OUTPATIENT)
Dept: DERMATOLOGY | Facility: CLINIC | Age: 70
End: 2020-12-14
Payer: COMMERCIAL

## 2020-12-14 DIAGNOSIS — D22.9 MULTIPLE BENIGN NEVI: ICD-10-CM

## 2020-12-14 DIAGNOSIS — L57.0 ACTINIC KERATOSIS: Primary | ICD-10-CM

## 2020-12-14 DIAGNOSIS — L81.4 SOLAR LENTIGO: ICD-10-CM

## 2020-12-14 DIAGNOSIS — L82.1 SEBORRHEIC KERATOSIS: ICD-10-CM

## 2020-12-14 DIAGNOSIS — D18.01 CHERRY ANGIOMA: ICD-10-CM

## 2020-12-14 PROCEDURE — 99214 OFFICE O/P EST MOD 30 MIN: CPT | Mod: 25 | Performed by: DERMATOLOGY

## 2020-12-14 PROCEDURE — 17000 DESTRUCT PREMALG LESION: CPT | Performed by: DERMATOLOGY

## 2020-12-14 PROCEDURE — 17003 DESTRUCT PREMALG LES 2-14: CPT | Performed by: DERMATOLOGY

## 2020-12-14 ASSESSMENT — PAIN SCALES - GENERAL: PAINLEVEL: NO PAIN (0)

## 2020-12-14 NOTE — LETTER
"    12/14/2020         RE: Karis Youssef  [address]  [place] [address]        Dear Colleague,    Thank you for referring your patient, Karis Youssef, to the [facility]. Please see a copy of my visit note below.    [facility] Dermatology Note      Dermatology Problem List:  1. Hx AKs. LiqN2.   2. Hx Sjogrens. ZELDA+, Ro+.    3. Lesion to monitor: L nasal sidewall. Hypertrophic AK, less likely NMSC.   - s/p LiqN2 12/14/2020  - consider biopsy at follow-up if not resvoled.     CC:   Chief Complaint   Patient presents with     Skin Check     FBSE, hx AKs- no personal or family hx of NMSC, area of concern: new possible AK near left nose again     Encounter Date: Dec 14, 2020    History of Present Illness:  Ms. Karis Youssef is a 70 year old female who presents as a follow-up for FBSE. The patient was last seen 11/21/19 by Lucy Gonzalez when was noted to have AK on the left nasal bridge, though deferred treatment at that visit due to an event.    Today, she reports a spot on the left nose of concern. States was noticed about 1-year ago at prior skin check but had deferred cryotherapy at the time. Reports lesion \"scabs\" over and has bled with manipulation, but no pain or tenderness.     The patient otherwise denies any new or concerning lesions. No bleeding, painful, pruritic, or changing lesions. They report no personal history of skin cancer. There is no family history of skin cancer. No history of immunosuppression. There is a history of indoor tanning about 4 times in her lifetime in her mid 30s. They do use sunscreen (SPF 70) and protective clothing when outdoors for sun protection. She typically burns when outdoors, has had blistering sunburns as a child.  No occupational exposure to ultraviolet light or other forms of radiation. Patient is a retired nurse. She has a history of Sjogrens (ZELDA+, Ro+) not on treatment. Health otherwise stable. No other " skin concerns.       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     Choroidal nevus, left     Acute pain of left shoulder     Displaced fracture of neck of right radius     Past Medical History:   Diagnosis Date     Abnormal Pap smear 1995    had colpo 1995-hyperplasia, nl since     Arthritis      Disturbance of skin sensation      Osteopenia      Osteopenia      Seasonal allergies      Sjogren's syndrome (H)      Past Surgical History:   Procedure Laterality Date     BIOPSY  2000    right breast lumpectomy     BREAST LUMPECTOMY, RT/LT      right breast, benign     CATARACT IOL, RT/LT      bilateral     ORTHOPEDIC SURGERY  01/2018    right radius        Social History:  Patient reports that she quit smoking about 36 years ago. She has never used smokeless tobacco. She reports current alcohol use. She reports that she does not use drugs.    Family History:  Family History   Problem Relation Age of Onset     C.A.D. Mother      Osteoporosis Mother      Hypertension Mother      Heart Disease Mother         CAD, CHF     Coronary Artery Disease Mother      Hyperlipidemia Mother      Diabetes Father      Hypertension Father      Cancer Brother         liver     Diabetes Brother      Diabetes Sister      Breast Cancer Sister 50     Hypertension Sister      Cancer Sister 72        non-hodgkins lymphoma     Cerebrovascular Disease Maternal Grandmother      Diabetes Paternal Grandfather      Connective Tissue Disorder Other         2 cousins and niece with MS; aunt with lupus     Rheumatoid Arthritis Cousin      Diabetes Niece      Hyperlipidemia Sister      Other Cancer Brother         Liver     Other Cancer Sister         non-hodgkins lymphoma     Thyroid Disease  Sister      Osteoporosis Sister        Medications:  Current Outpatient Medications   Medication Sig Dispense Refill     Acetaminophen (TYLENOL PO) Take 325 mg by mouth       Calcium Carbonate-Vitamin D (CALCIUM 600 + D OR) Take by mouth daily       carboxymethylcellulose (CELLUVISC/REFRESH LIQUIGEL) 1 % ophthalmic solution Place 1 drop into both eyes 3 times daily       cetirizine (ZYRTEC) 10 MG tablet Take 10 mg by mouth daily       diclofenac (VOLTAREN) 1 % topical gel Place onto the skin 4 times daily 100 g 6     MAGNESIUM PO Take 250 mg by mouth daily        SUDAFED 30 MG OR TABS 1 TABLET EVERY 4 TO 6 HOURS AS NEEDED       VITAMIN C 500 MG OR TABS 1 TABLET  DAILY       VITAMIN E 400 UNIT PO CAPS 1 CAPSULE DAILY       Allergies   Allergen Reactions     Erythromycin Nausea     Tongue turned black.       Review of Systems:  -As per HPI  -Constitutional: Otherwise feeling well today, in usual state of health.  -Skin: As above in HPI. No additional skin concerns.    Physical exam:  Vitals: There were no vitals taken for this visit.  GEN: This is a well developed, well-nourished female in no acute distress, in a pleasant mood.    SKIN: Full skin, which includes the head/face, both arms, chest, back, abdomen,both legs, genitalia and/or groin buttocks, digits and/or nails, was examined.  - Swanson skin type: I/II  - Brown macules on sun exposed areas  - Red vascular papules on trunk  - Brown, waxy, stuck-on appearing papules on trunk.   - Brown macules and papules on trunk and extremities without concerning dermoscopy features  - Pink gritty papules (4-5 mm), slightly eroded/excoraited, on the left nasal sidewall x 1.  - Pink gritty papule on the left cheek x 1, right upper cutaneous lip x 1.   - No other lesions of concern on areas examined.         Labs:  None.     Impression/Plan:    1. Lesion to monitor: Scaly papule on L nasal sidewall c/w hypertrophic AK. Less likely consider SCC/BCC. Discussed biopsy versus  cryotherapy with close monitoring and consideration of biopsy if not resolved and patient elected for latter.   - Photodocumentation as above; follow-up in 6-8 weeks to re-assess    - Cryotherapy procedure note: After verbal consent and discussion of risks and benefits including but no limited to dyspigmentation/scar, blister, and pain, 1 lesion was(were) treated with 1-2mm freeze border for 2 cycles with liquid nitrogen. Post cryotherapy instructions were provided.    2. Actinic keratosis. L cheek x 1, R upper cutaneous lip x 1.   - Cryotherapy procedure note: After verbal consent and discussion of risks and benefits including but no limited to dyspigmentation/scar, blister, and pain, 2 lesions was(were) treated with 1-2mm freeze border for 2 cycles with liquid nitrogen. Post cryotherapy instructions were provided.    3. Benign lesions: Multiple benign nevi, solar lentigos, seborrheic keratoses, cherry angiomas. Explained to patient benign nature of lesion. No treatment is necessary at this time unless the lesion changes or becomes symptomatic.     - ABCDs of melanoma were discussed and self skin checks were advised.  - Sun precaution was advised including the use of sun screens of SPF 30 or higher, sun protective clothing, and avoidance of tanning beds.    Follow-up in 6-8 weeks, earlier for new or changing lesions.     Staff Involved:  Staff Only    Adelfo Berrios MD  Pronouns: he/him/his    Department of Dermatology  [facility]: Phone: [phone], Fax:[phone]  [facility]: Phone: [phone] Fax: [phone]            Again, thank you for allowing me to participate in the care of your patient.        Sincerely,        Adelfo Berrios MD

## 2020-12-14 NOTE — NURSING NOTE
Karis Youssef's goals for this visit include:   Chief Complaint   Patient presents with     Skin Check     FBSE, hx AKs- no personal or family hx of NMSC, area of concern: new possible AK near left nose again     She requests these members of her care team be copied on today's visit information: Yes    PCP: Josee Carpenter    Referring Provider:  No referring provider defined for this encounter.    There were no vitals taken for this visit.    Do you need any medication refills at today's visit? No    Amber Fulton LPN

## 2020-12-14 NOTE — PATIENT INSTRUCTIONS
Cryotherapy    What is it?    Use of a very cold liquid, such as liquid nitrogen, to freeze and destroy abnormal skin cells that need to be removed    What should I expect?    Tenderness and redness    A small blister that might grow and fill with dark purple blood. There may be crusting.    More than one treatment may be needed if the lesions do not go away.    How do I care for the treated area?    Gently wash the area with your hands when bathing.    Use a thin layer of Vaseline to help with healing. You may use a Band-Aid.     The area should heal within 7-10 days and may leave behind a pink or lighter color.     Do not use an antibiotic or Neosporin ointment.     You may take acetaminophen (Tylenol) for pain.     Call your Doctor if you have:    Severe pain    Signs of infection (warmth, redness, cloudy yellow drainage, and or a bad smell)    Questions or concerns    Who should I call with questions?       [facility]: [phone]       [facility]: [phone]       For urgent needs outside of business hours call the [facility] at [phone]        and ask for the dermatology resident on call

## 2020-12-14 NOTE — PROGRESS NOTES
"[facility] Dermatology Note      Dermatology Problem List:  1. Hx AKs. LiqN2.   2. Hx Sjogrens. ZELDA+, Ro+.    3. Lesion to monitor: L nasal sidewall. Hypertrophic AK, less likely NMSC.   - s/p LiqN2 12/14/2020  - consider biopsy at follow-up if not resvoled.     CC:   Chief Complaint   Patient presents with     Skin Check     FBSE, hx AKs- no personal or family hx of NMSC, area of concern: new possible AK near left nose again     Encounter Date: Dec 14, 2020    History of Present Illness:  Ms. Karis Youssef is a 70 year old female who presents as a follow-up for FBSE. The patient was last seen 11/21/19 by Lucy Gonzalez when was noted to have AK on the left nasal bridge, though deferred treatment at that visit due to an event.    Today, she reports a spot on the left nose of concern. States was noticed about 1-year ago at prior skin check but had deferred cryotherapy at the time. Reports lesion \"scabs\" over and has bled with manipulation, but no pain or tenderness.     The patient otherwise denies any new or concerning lesions. No bleeding, painful, pruritic, or changing lesions. They report no personal history of skin cancer. There is no family history of skin cancer. No history of immunosuppression. There is a history of indoor tanning about 4 times in her lifetime in her mid 30s. They do use sunscreen (SPF 70) and protective clothing when outdoors for sun protection. She typically burns when outdoors, has had blistering sunburns as a child.  No occupational exposure to ultraviolet light or other forms of radiation. Patient is a retired nurse. She has a history of Sjogrens (ZELDA+, Ro+) not on treatment. Health otherwise stable. No other skin concerns.       Past Medical History:   Patient Active Problem List   Diagnosis     Thigh pain     Osteopenia     Sjogren's syndrome (H)     CARDIOVASCULAR SCREENING; LDL GOAL LESS THAN 160     Advanced directives, counseling/discussion     Palpitations     " Acute conjunctivitis     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     Choroidal nevus, left     Acute pain of left shoulder     Displaced fracture of neck of right radius     Past Medical History:   Diagnosis Date     Abnormal Pap smear 1995    had colpo 1995-hyperplasia, nl since     Arthritis      Disturbance of skin sensation      Osteopenia      Osteopenia      Seasonal allergies      Sjogren's syndrome (H)      Past Surgical History:   Procedure Laterality Date     BIOPSY  2000    right breast lumpectomy     BREAST LUMPECTOMY, RT/LT      right breast, benign     CATARACT IOL, RT/LT      bilateral     ORTHOPEDIC SURGERY  01/2018    right radius        Social History:  Patient reports that she quit smoking about 36 years ago. She has never used smokeless tobacco. She reports current alcohol use. She reports that she does not use drugs.    Family History:  Family History   Problem Relation Age of Onset     C.A.D. Mother      Osteoporosis Mother      Hypertension Mother      Heart Disease Mother         CAD, CHF     Coronary Artery Disease Mother      Hyperlipidemia Mother      Diabetes Father      Hypertension Father      Cancer Brother         liver     Diabetes Brother      Diabetes Sister      Breast Cancer Sister 50     Hypertension Sister      Cancer Sister 72        non-hodgkins lymphoma     Cerebrovascular Disease Maternal Grandmother      Diabetes Paternal Grandfather      Connective Tissue Disorder Other         2 cousins and niece with MS; aunt with lupus     Rheumatoid Arthritis Cousin      Diabetes Niece      Hyperlipidemia Sister      Other Cancer Brother         Liver     Other Cancer Sister         non-hodgkins lymphoma     Thyroid Disease Sister      Osteoporosis Sister        Medications:  Current Outpatient Medications   Medication Sig Dispense Refill     Acetaminophen (TYLENOL PO) Take 325 mg by mouth       Calcium Carbonate-Vitamin D (CALCIUM 600 + D OR) Take by mouth daily        carboxymethylcellulose (CELLUVISC/REFRESH LIQUIGEL) 1 % ophthalmic solution Place 1 drop into both eyes 3 times daily       cetirizine (ZYRTEC) 10 MG tablet Take 10 mg by mouth daily       diclofenac (VOLTAREN) 1 % topical gel Place onto the skin 4 times daily 100 g 6     MAGNESIUM PO Take 250 mg by mouth daily        SUDAFED 30 MG OR TABS 1 TABLET EVERY 4 TO 6 HOURS AS NEEDED       VITAMIN C 500 MG OR TABS 1 TABLET  DAILY       VITAMIN E 400 UNIT PO CAPS 1 CAPSULE DAILY       Allergies   Allergen Reactions     Erythromycin Nausea     Tongue turned black.       Review of Systems:  -As per HPI  -Constitutional: Otherwise feeling well today, in usual state of health.  -Skin: As above in HPI. No additional skin concerns.    Physical exam:  Vitals: There were no vitals taken for this visit.  GEN: This is a well developed, well-nourished female in no acute distress, in a pleasant mood.    SKIN: Full skin, which includes the head/face, both arms, chest, back, abdomen,both legs, genitalia and/or groin buttocks, digits and/or nails, was examined.  - Swanson skin type: I/II  - Brown macules on sun exposed areas  - Red vascular papules on trunk  - Brown, waxy, stuck-on appearing papules on trunk.   - Brown macules and papules on trunk and extremities without concerning dermoscopy features  - Pink gritty papules (4-5 mm), slightly eroded/excoraited, on the left nasal sidewall x 1.  - Pink gritty papule on the left cheek x 1, right upper cutaneous lip x 1.   - No other lesions of concern on areas examined.         Labs:  None.     Impression/Plan:    1. Lesion to monitor: Scaly papule on L nasal sidewall c/w hypertrophic AK. Less likely consider SCC/BCC. Discussed biopsy versus cryotherapy with close monitoring and consideration of biopsy if not resolved and patient elected for latter.   - Photodocumentation as above; follow-up in 6-8 weeks to re-assess    - Cryotherapy procedure note: After verbal consent and discussion of  risks and benefits including but no limited to dyspigmentation/scar, blister, and pain, 1 lesion was(were) treated with 1-2mm freeze border for 2 cycles with liquid nitrogen. Post cryotherapy instructions were provided.    2. Actinic keratosis. L cheek x 1, R upper cutaneous lip x 1.   - Cryotherapy procedure note: After verbal consent and discussion of risks and benefits including but no limited to dyspigmentation/scar, blister, and pain, 2 lesions was(were) treated with 1-2mm freeze border for 2 cycles with liquid nitrogen. Post cryotherapy instructions were provided.    3. Benign lesions: Multiple benign nevi, solar lentigos, seborrheic keratoses, cherry angiomas. Explained to patient benign nature of lesion. No treatment is necessary at this time unless the lesion changes or becomes symptomatic.     - ABCDs of melanoma were discussed and self skin checks were advised.  - Sun precaution was advised including the use of sun screens of SPF 30 or higher, sun protective clothing, and avoidance of tanning beds.    Follow-up in 6-8 weeks, earlier for new or changing lesions.     Staff Involved:  Staff Only    Adelfo Berrios MD  Pronouns: he/him/his    Department of Dermatology  [facility]: Phone: [phone], Fax:[phone]  [facility]: Phone: [phone] Fax: [phone]

## 2020-12-21 ENCOUNTER — OFFICE VISIT (OUTPATIENT)
Dept: FAMILY MEDICINE | Facility: CLINIC | Age: 70
End: 2020-12-21
Payer: COMMERCIAL

## 2020-12-21 VITALS
WEIGHT: 220.2 LBS | OXYGEN SATURATION: 96 % | HEART RATE: 75 BPM | RESPIRATION RATE: 20 BRPM | SYSTOLIC BLOOD PRESSURE: 122 MMHG | HEIGHT: 65 IN | BODY MASS INDEX: 36.69 KG/M2 | DIASTOLIC BLOOD PRESSURE: 82 MMHG | TEMPERATURE: 96.8 F

## 2020-12-21 DIAGNOSIS — M85.80 OSTEOPENIA, UNSPECIFIED LOCATION: ICD-10-CM

## 2020-12-21 DIAGNOSIS — E28.39 ESTROGEN DEFICIENCY: ICD-10-CM

## 2020-12-21 DIAGNOSIS — Z00.00 ENCOUNTER FOR MEDICARE ANNUAL WELLNESS EXAM: Primary | ICD-10-CM

## 2020-12-21 DIAGNOSIS — M35.01 SJOGREN'S SYNDROME WITH KERATOCONJUNCTIVITIS SICCA (H): ICD-10-CM

## 2020-12-21 LAB
CHOLEST SERPL-MCNC: 228 MG/DL
GLUCOSE SERPL-MCNC: 99 MG/DL (ref 70–99)
HDLC SERPL-MCNC: 71 MG/DL
LDLC SERPL CALC-MCNC: 140 MG/DL
NONHDLC SERPL-MCNC: 157 MG/DL
TRIGL SERPL-MCNC: 84 MG/DL

## 2020-12-21 PROCEDURE — 36415 COLL VENOUS BLD VENIPUNCTURE: CPT | Performed by: FAMILY MEDICINE

## 2020-12-21 PROCEDURE — 80061 LIPID PANEL: CPT | Performed by: FAMILY MEDICINE

## 2020-12-21 PROCEDURE — 82947 ASSAY GLUCOSE BLOOD QUANT: CPT | Performed by: FAMILY MEDICINE

## 2020-12-21 PROCEDURE — 99397 PER PM REEVAL EST PAT 65+ YR: CPT | Performed by: FAMILY MEDICINE

## 2020-12-21 ASSESSMENT — ENCOUNTER SYMPTOMS
MYALGIAS: 0
WEAKNESS: 0
HEMATOCHEZIA: 0
NAUSEA: 0
PARESTHESIAS: 0
HEARTBURN: 0
FEVER: 0
PALPITATIONS: 0
FREQUENCY: 0
BREAST MASS: 0
ABDOMINAL PAIN: 0
JOINT SWELLING: 0
HEMATURIA: 0
HEADACHES: 0
DYSURIA: 0
SHORTNESS OF BREATH: 0
DIZZINESS: 0
SORE THROAT: 0
CONSTIPATION: 0
CHILLS: 0
COUGH: 0
NERVOUS/ANXIOUS: 0
ARTHRALGIAS: 0
DIARRHEA: 0
EYE PAIN: 0

## 2020-12-21 ASSESSMENT — ACTIVITIES OF DAILY LIVING (ADL): CURRENT_FUNCTION: NO ASSISTANCE NEEDED

## 2020-12-21 ASSESSMENT — MIFFLIN-ST. JEOR: SCORE: 1529.7

## 2020-12-21 NOTE — NURSING NOTE
"Chief Complaint   Patient presents with     Wellness Visit       Initial /82   Pulse 75   Temp 96.8  F (36  C) (Tympanic)   Resp 20   Ht 1.651 m (5' 5\")   Wt 99.9 kg (220 lb 3.2 oz)   SpO2 96%   BMI 36.64 kg/m   Estimated body mass index is 36.64 kg/m  as calculated from the following:    Height as of this encounter: 1.651 m (5' 5\").    Weight as of this encounter: 99.9 kg (220 lb 3.2 oz).  Medication Reconciliation: complete  Pipe Best CMA    "

## 2021-02-08 ENCOUNTER — OFFICE VISIT (OUTPATIENT)
Dept: DERMATOLOGY | Facility: CLINIC | Age: 71
End: 2021-02-08
Payer: COMMERCIAL

## 2021-02-08 DIAGNOSIS — L57.0 ACTINIC KERATOSIS: ICD-10-CM

## 2021-02-08 DIAGNOSIS — D48.5 NEOPLASM OF UNCERTAIN BEHAVIOR OF SKIN: Primary | ICD-10-CM

## 2021-02-08 PROCEDURE — 17000 DESTRUCT PREMALG LESION: CPT | Mod: 59 | Performed by: DERMATOLOGY

## 2021-02-08 PROCEDURE — 88305 TISSUE EXAM BY PATHOLOGIST: CPT | Performed by: PATHOLOGY

## 2021-02-08 PROCEDURE — 11102 TANGNTL BX SKIN SINGLE LES: CPT | Performed by: DERMATOLOGY

## 2021-02-08 ASSESSMENT — PAIN SCALES - GENERAL: PAINLEVEL: NO PAIN (0)

## 2021-02-08 NOTE — LETTER
2/8/2021         RE: Karis Youssef  [address]  [place] [address]        Dear Colleague,    Thank you for referring your patient, Karis Youssef, to the [facility]. Please see a copy of my visit note below.    [facility] Dermatology Note  Encounter Date: Feb 8, 2021  Office Visit     Dermatology Problem List:  1. Hx AKs. LiqN2.   2. Hx Sjogrens. ZELDA+, Ro+.    3. NUB, R upper cutaneous lip. Ddx BCC, fibrous papule, AK/SCC. S/p shave bx 2/8/21.   ____________________________________________    Assessment & Plan:     # Neoplasm of uncertain behavior of skin, right upper cutaneous lip. The differential diagnosis includes BCC, fibrous papule, AK/SCC.   - Shave biopsy as below    # Actinic keratosis, R nasal tip x 1.   - Cryotherapy as below    Procedures Performed:   - Shave biopsy procedure note, location(s): right upper cutaneous lip. After discussion of benefits and risks including but not limited to bleeding, infection, scar, incomplete removal, recurrence, and non-diagnostic biopsy, written consent and photographs were obtained. The area was cleaned with isopropyl alcohol. 0.5mL of 1% lidocaine with epinephrine was injected to obtain adequate anesthesia of lesion(s). Shave biopsy at site(s) performed. Hemostasis was achieved with aluminium chloride. Petrolatum ointment and a sterile dressing were applied. The patient tolerated the procedure and no complications were noted. The patient was provided with verbal and written post care instructions.   - Cryotherapy procedure note, location(s): right nasal tip. After verbal consent and discussion of risks and benefits including, but not limited to, dyspigmentation/scar, blister, and pain, 1 lesion(s) was(were) treated with 1-2 mm freeze border for 1-2 cycles with liquid nitrogen. Post cryotherapy instructions were provided.    Follow-up: pending path results    Staff:     Adelfo Berrios MD  Pronouns:  he/him/his    Department of Dermatology  [facility]: Phone: [phone], Fax:[phone]  [facility]: Phone: [phone] Fax: [phone]  ____________________________________________    CC: RECHECK (a few spots on the face- all have been treated with liquid nitrogen)    HPI:  Ms. Karis Youssef is a(n) 70 year old female who presents today as a return patient for follow-up lesion on the left nasal sidewall. Last seen on 12/14/2020 when had cryotherapy for suspected AK on the L nasal sidewall. Recommended close follow-up to ensure resolution and consideration of biopsy to r/o NMSC if not resolved.     Today, patient reports the spot on the left nasal sidewall had resolved since last visit. She does report a new gritty spot on the right nasal tip. She also reports that while many of the spot treated with cryotherapy on the right upper lip resolved, there is a single remaining lesion. No pain, bleeding, pruritus, but does get scaly.     Patient is otherwise feeling well, without additional concerns.     Labs:  NA    Physical Exam:  Vitals: There were no vitals taken for this visit.  SKIN: Focused examination of face was performed.  - Pink gritty papule on the right nasal tip x 1  - On the right upper cutaneous lip, there is a ~2-3 mm pink shiny papule with overlying scale; non-specific dilated blood vessels appreciated under dermoscopy.    - No other lesions of concern on areas examined.         Medications:  Current Outpatient Medications   Medication     Acetaminophen (TYLENOL PO)     Calcium Carbonate-Vitamin D (CALCIUM 600 + D OR)     carboxymethylcellulose (CELLUVISC/REFRESH LIQUIGEL) 1 % ophthalmic solution     cetirizine (ZYRTEC) 10 MG tablet     diclofenac (VOLTAREN) 1 % topical gel     MAGNESIUM PO     SUDAFED 30 MG OR TABS     VITAMIN C 500 MG OR TABS     VITAMIN E 400 UNIT PO CAPS      No current facility-administered medications for this visit.       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     Choroidal nevus, left     Acute pain of left shoulder     Displaced fracture of neck of right radius     Past Medical History:   Diagnosis Date     Abnormal Pap smear 1995    had colpo 1995-hyperplasia, nl since     Arthritis      Disturbance of skin sensation      Osteopenia      Osteopenia      Seasonal allergies      Sjogren's syndrome (H)        Again, thank you for allowing me to participate in the care of your patient.        Sincerely,        Adelfo Berrios MD

## 2021-02-08 NOTE — PROGRESS NOTES
[facility] Dermatology Note  Encounter Date: Feb 8, 2021  Office Visit     Dermatology Problem List:  1. Hx AKs. LiqN2.   2. Hx Sjogrens. ZELDA+, Ro+.    3. NUB, R upper cutaneous lip. Ddx BCC, fibrous papule, AK/SCC. S/p shave bx 2/8/21.   ____________________________________________    Assessment & Plan:     # Neoplasm of uncertain behavior of skin, right upper cutaneous lip. The differential diagnosis includes BCC, fibrous papule, AK/SCC.   - Shave biopsy as below    # Actinic keratosis, R nasal tip x 1.   - Cryotherapy as below    Procedures Performed:   - Shave biopsy procedure note, location(s): right upper cutaneous lip. After discussion of benefits and risks including but not limited to bleeding, infection, scar, incomplete removal, recurrence, and non-diagnostic biopsy, written consent and photographs were obtained. The area was cleaned with isopropyl alcohol. 0.5mL of 1% lidocaine with epinephrine was injected to obtain adequate anesthesia of lesion(s). Shave biopsy at site(s) performed. Hemostasis was achieved with aluminium chloride. Petrolatum ointment and a sterile dressing were applied. The patient tolerated the procedure and no complications were noted. The patient was provided with verbal and written post care instructions.   - Cryotherapy procedure note, location(s): right nasal tip. After verbal consent and discussion of risks and benefits including, but not limited to, dyspigmentation/scar, blister, and pain, 1 lesion(s) was(were) treated with 1-2 mm freeze border for 1-2 cycles with liquid nitrogen. Post cryotherapy instructions were provided.    Follow-up: pending path results    Staff:     Adelfo Berrios MD  Pronouns: he/him/his    Department of Dermatology  [facility]: Phone: [phone], Fax:[phone]  [facility]: Phone: [phone] Fax:  [phone]  ____________________________________________    CC: RECHECK (a few spots on the face- all have been treated with liquid nitrogen)    HPI:  Ms. Karis Youssef is a(n) 70 year old female who presents today as a return patient for follow-up lesion on the left nasal sidewall. Last seen on 12/14/2020 when had cryotherapy for suspected AK on the L nasal sidewall. Recommended close follow-up to ensure resolution and consideration of biopsy to r/o NMSC if not resolved.     Today, patient reports the spot on the left nasal sidewall had resolved since last visit. She does report a new gritty spot on the right nasal tip. She also reports that while many of the spot treated with cryotherapy on the right upper lip resolved, there is a single remaining lesion. No pain, bleeding, pruritus, but does get scaly.     Patient is otherwise feeling well, without additional concerns.     Labs:  NA    Physical Exam:  Vitals: There were no vitals taken for this visit.  SKIN: Focused examination of face was performed.  - Pink gritty papule on the right nasal tip x 1  - On the right upper cutaneous lip, there is a ~2-3 mm pink shiny papule with overlying scale; non-specific dilated blood vessels appreciated under dermoscopy.    - No other lesions of concern on areas examined.         Medications:  Current Outpatient Medications   Medication     Acetaminophen (TYLENOL PO)     Calcium Carbonate-Vitamin D (CALCIUM 600 + D OR)     carboxymethylcellulose (CELLUVISC/REFRESH LIQUIGEL) 1 % ophthalmic solution     cetirizine (ZYRTEC) 10 MG tablet     diclofenac (VOLTAREN) 1 % topical gel     MAGNESIUM PO     SUDAFED 30 MG OR TABS     VITAMIN C 500 MG OR TABS     VITAMIN E 400 UNIT PO CAPS     No current facility-administered medications for this visit.       Past Medical History:   Patient Active Problem List   Diagnosis     Thigh pain     Osteopenia     Sjogren's syndrome (H)     CARDIOVASCULAR SCREENING; LDL GOAL LESS THAN 160      Advanced directives, counseling/discussion     Palpitations     Acute conjunctivitis     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     Choroidal nevus, left     Acute pain of left shoulder     Displaced fracture of neck of right radius     Past Medical History:   Diagnosis Date     Abnormal Pap smear 1995    had colpo 1995-hyperplasia, nl since     Arthritis      Disturbance of skin sensation      Osteopenia      Osteopenia      Seasonal allergies      Sjogren's syndrome (H)

## 2021-02-08 NOTE — NURSING NOTE
Karis Youssef's goals for this visit include:   Chief Complaint   Patient presents with     RECHECK     a few spots on the face- all have been treated with liquid nitrogen     She requests these members of her care team be copied on today's visit information:     PCP: Josee Carpenter    Referring Provider:  No referring provider defined for this encounter.    There were no vitals taken for this visit.    Do you need any medication refills at today's visit? No    Kanchan Kelly LPN

## 2021-02-10 LAB — COPATH REPORT: NORMAL

## 2021-02-11 ENCOUNTER — TELEPHONE (OUTPATIENT)
Dept: DERMATOLOGY | Facility: CLINIC | Age: 71
End: 2021-02-11

## 2021-02-11 NOTE — TELEPHONE ENCOUNTER
Noemi Allen RN   2/11/2021 10:33 AM CST      Pt notified of results and 's recommendation. Follow up scheduled...Noemi Oswald, Adelfo Deshpande MD  [facility] [place]             Can we call patient and let her know the spot on the lip was a thick pre-skin cancer. I would like her to follow-up in about 3 months to make sure the lesion is completely treated. We can do another full skin check at that visit.     Adelfo Oswald MD   Pronouns: he/him/his      Department of Dermatology   [facility]: Phone: [phone], Fax:[phone]   [facility]: Phone: [phone] Fax: [phone]    Associated Results    Dermatological path order and indications  Order: [id number]  Status:  Final result   Visible to patient:  Yes (MyChart) Dx:  Neoplasm of uncertain behavior of skin  Component 3d ago   Copath Report Patient Name: MARY COREAS   MR#: [id number]   Specimen #: [id number]   Collected: 2/8/2021   Received: 2/9/2021   Reported: 2/10/2021 15:39   Ordering Phy(s): ADELFO OSWALD     For improved result formatting, select 'View Enhanced Report Format' under    Linked Documents section.     SPECIMEN(S):   Skin, right upper cutaneous lip, shave     FINAL DIAGNOSIS:   Skin, right upper cutaneous lip, shave:   - Actinic keratosis - (see comment)

## 2021-02-11 NOTE — RESULT ENCOUNTER NOTE
Can we call patient and let her know the spot on the lip was a thick pre-skin cancer. I would like her to follow-up in about 3 months to make sure the lesion is completely treated. We can do another full skin check at that visit.     Adelfo Berrios MD  Pronouns: he/him/his    Department of Dermatology  [facility]: Phone: [phone], Fax:[phone]  [facility]: Phone: [phone] Fax: [phone]

## 2021-03-10 ENCOUNTER — IMMUNIZATION (OUTPATIENT)
Dept: NURSING | Facility: CLINIC | Age: 71
End: 2021-03-10
Payer: COMMERCIAL

## 2021-03-10 PROCEDURE — 91303 PR COVID VAC JANSSEN AD26 0.5ML: CPT

## 2021-03-10 PROCEDURE — 0031A PR COVID VAC JANSSEN AD26 0.5ML: CPT

## 2021-05-12 ENCOUNTER — OFFICE VISIT (OUTPATIENT)
Dept: URGENT CARE | Facility: URGENT CARE | Age: 71
End: 2021-05-12
Payer: COMMERCIAL

## 2021-05-12 ENCOUNTER — ANCILLARY PROCEDURE (OUTPATIENT)
Dept: GENERAL RADIOLOGY | Facility: CLINIC | Age: 71
End: 2021-05-12
Attending: PREVENTIVE MEDICINE
Payer: COMMERCIAL

## 2021-05-12 VITALS
DIASTOLIC BLOOD PRESSURE: 84 MMHG | WEIGHT: 225.6 LBS | OXYGEN SATURATION: 97 % | SYSTOLIC BLOOD PRESSURE: 130 MMHG | HEART RATE: 79 BPM | TEMPERATURE: 97.9 F | BODY MASS INDEX: 37.54 KG/M2

## 2021-05-12 DIAGNOSIS — S22.070A COMPRESSION FRACTURE OF T9 VERTEBRA, INITIAL ENCOUNTER (H): Primary | ICD-10-CM

## 2021-05-12 DIAGNOSIS — S22.070A COMPRESSION FRACTURE OF T9 VERTEBRA, INITIAL ENCOUNTER (H): ICD-10-CM

## 2021-05-12 DIAGNOSIS — M54.9 MUSCULOSKELETAL BACK PAIN: ICD-10-CM

## 2021-05-12 DIAGNOSIS — M54.6 ACUTE MIDLINE THORACIC BACK PAIN: Primary | ICD-10-CM

## 2021-05-12 PROCEDURE — 72070 X-RAY EXAM THORAC SPINE 2VWS: CPT | Performed by: RADIOLOGY

## 2021-05-12 PROCEDURE — 99215 OFFICE O/P EST HI 40 MIN: CPT | Performed by: PREVENTIVE MEDICINE

## 2021-05-12 RX ORDER — CALCITONIN SALMON 200 [IU]/.09ML
1 SPRAY, METERED NASAL DAILY
Qty: 3.7 ML | Refills: 0 | Status: SHIPPED | OUTPATIENT
Start: 2021-05-12 | End: 2021-06-10

## 2021-05-12 NOTE — PROGRESS NOTES
Assessment & Plan     1. Acute midline thoracic back pain  C/w with musculoskeletal low back pain from t9 compression fracture  - XR Thoracic Spine 2 Views  Advise tylenol 500 mg three times per day  Heat and ice to area of pain  Voltaren gel three times per day  Calcitonin nasal spray daily.  Follow up with pcp in one week for further assessment and treatment  of osteoporosis (as this is a nontraumatic fracture) and compression fracture pain      41 minutes spent on the date of the encounter doing chart review, review of test results, interpretation of tests, patient visit and documentation         No follow-ups on file.    Omega Costello MD  [facility]    Subjective     Karis Youssef is a 71 year old year old female who presents to clinic today for the following health issues:    Patient presents with:  Back Pain: friday was putting up a fence with son and while doing so turned and the pain started     This is a 70 yo female who hurt her back while putting in a fence with her son 5 days ago.  Her mid back has been painful since that time.  She felt spasm today which was more severe so she comes into the clinic for evaluation.  She has no significant history of back problems.  No change in bowel or bladder function.  No pelvic change in sensation.  No change in strength or sensation in the ble.  No fever.  She has tried voltaren gel and tylenol without relief.  Also ice.      Patient Active Problem List   Diagnosis     Thigh pain     Osteopenia     Sjogren's syndrome (H)     CARDIOVASCULAR SCREENING; LDL GOAL LESS THAN 160     Advanced directives, counseling/discussion     Palpitations     Acute conjunctivitis     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     Choroidal nevus, left     Acute pain of left shoulder     Displaced fracture of neck of right radius       Current Outpatient Medications   Medication     Acetaminophen (TYLENOL PO)     Calcium Carbonate-Vitamin D (CALCIUM 600 +  D OR)     carboxymethylcellulose (CELLUVISC/REFRESH LIQUIGEL) 1 % ophthalmic solution     cetirizine (ZYRTEC) 10 MG tablet     diclofenac (VOLTAREN) 1 % topical gel     MAGNESIUM PO     SUDAFED 30 MG OR TABS     VITAMIN C 500 MG OR TABS     VITAMIN E 400 UNIT PO CAPS     No current facility-administered medications for this visit.        Past Medical History:   Diagnosis Date     Abnormal Pap smear 1995    had colpo 1995-hyperplasia, nl since     Arthritis      Disturbance of skin sensation      Osteopenia      Osteopenia      Seasonal allergies      Sjogren's syndrome (H)        Social History   reports that she quit smoking about 37 years ago. She has never used smokeless tobacco. She reports current alcohol use. She reports that she does not use drugs.    Family History   Problem Relation Age of Onset     C.A.D. Mother      Osteoporosis Mother      Hypertension Mother      Heart Disease Mother         CAD, CHF     Coronary Artery Disease Mother      Hyperlipidemia Mother      Diabetes Father      Hypertension Father      Cancer Brother         liver     Diabetes Brother      Diabetes Sister      Breast Cancer Sister 50     Hypertension Sister      Cancer Sister 72        non-hodgkins lymphoma     Cerebrovascular Disease Maternal Grandmother      Diabetes Paternal Grandfather      Diabetes Daughter         type 2     Connective Tissue Disorder Other         2 cousins and niece with MS; aunt with lupus     Rheumatoid Arthritis Cousin      Diabetes Niece      Hyperlipidemia Sister      Other Cancer Brother         Liver     Other Cancer Sister         non-hodgkins lymphoma     Thyroid Disease Sister      Osteoporosis Sister        Review of Systems  Constitutional, HEENT, cardiovascular, pulmonary, GI, , musculoskeletal, neuro, skin, endocrine and psych systems are negative, except as otherwise noted.      Objective    /84   Pulse 79   Temp 97.9  F (36.6  C) (Tympanic)   Wt 102.3 kg (225 lb 9.6 oz)    SpO2 97%   BMI 37.54 kg/m    Physical Exam   GENERAL: healthy, alert and no distress  EYES: Eyes grossly normal to inspection, PERRL and conjunctivae and sclerae normal  HENT: ear canals and TM's normal, nose and mouth without ulcers or lesions  NECK: no adenopathy, no asymmetry, masses, or scars and thyroid normal to palpation  RESP: lungs clear to auscultation - no rales, rhonchi or wheezes  CV: regular rate and rhythm, normal S1 S2, no S3 or S4, no murmur, click or rub, no peripheral edema and peripheral pulses strong  ABDOMEN: soft, nontender, no hepatosplenomegaly, no masses and bowel sounds normal  MS: no gross musculoskeletal defects noted, no edema  SKIN: no suspicious lesions or rashes  NEURO: Normal strength and tone, mentation intact and speech normal  PSYCH: mentation appears normal, affect normal/bright  T spine - no ttp c spine or l spine,  mild ttp T spine and b paraspinal region there, neg slr jordana, nl int/ext rot hips, nl strength, senation, reflexes jordana LE, nl rom back, nl gain, no si jt ttp, no pelvic tilt, no rash    Thoracic Spine Xray - Reviewed and interpreted by me.  Compression fracture at t9

## 2021-05-12 NOTE — PATIENT INSTRUCTIONS
1. Acute midline thoracic back pain  C/w with musculoskeletal low back pain  - XR Thoracic Spine 2 Views  Advise tylenol 500 mg three times per day  Heat and ice to area of pain  Voltaren gel three times per day  PT if not improving in next 7-10 days.

## 2021-05-14 ENCOUNTER — TELEPHONE (OUTPATIENT)
Dept: FAMILY MEDICINE | Facility: CLINIC | Age: 71
End: 2021-05-14

## 2021-05-14 DIAGNOSIS — S22.070A COMPRESSION FRACTURE OF T9 VERTEBRA, INITIAL ENCOUNTER (H): Primary | ICD-10-CM

## 2021-05-14 RX ORDER — CYCLOBENZAPRINE HCL 5 MG
5-10 TABLET ORAL 3 TIMES DAILY PRN
Qty: 30 TABLET | Refills: 0 | Status: SHIPPED | OUTPATIENT
Start: 2021-05-14 | End: 2021-11-09

## 2021-05-14 RX ORDER — HYDROCODONE BITARTRATE AND ACETAMINOPHEN 5; 325 MG/1; MG/1
1 TABLET ORAL EVERY 6 HOURS PRN
Qty: 18 TABLET | Refills: 0 | Status: SHIPPED | OUTPATIENT
Start: 2021-05-14 | End: 2021-05-19

## 2021-05-14 RX ORDER — CELECOXIB 100 MG/1
100 CAPSULE ORAL 2 TIMES DAILY
Qty: 60 CAPSULE | Refills: 0 | Status: SHIPPED | OUTPATIENT
Start: 2021-05-14 | End: 2021-11-09

## 2021-05-14 NOTE — TELEPHONE ENCOUNTER
Patient calling stating she has had back pain x 6 days.   She went to  2 days ago and was diagnosed with a compression fracture.   She has been using ibuprofen/Tylenol and heat.  The ibuprofen is giving her GI upset.   Patient was offered flexeril from  but declined it.  Patient was told to follow up with Dr Josee Carpenter but she is out of the office next week  Patient wondering if physical therapy might help? Also requesting maybe a patch or something to help with the pain. She is able to walk and shower and do most things but it is painful.     Result note from xray:  Discussed with patient.  Advise tylenol and voltaren gel as we discussed in the visit.  Also calcitonin nasal spray daily for the next 2 weeks and follow up with Dr Carpenter to discuss osteoporosis treatment options as this was a nontraumatic fracture.    Routing to provider to advise.  Cynthia PADGETTN, RN

## 2021-05-14 NOTE — TELEPHONE ENCOUNTER
"Patient/parent is informed of MD note below, as it is written. Verbalized good understanding.  Patient wonders if she can get a muscle relaxer.  \"the muscles by the spine see really tight.\"  Would be willing to try Flexeril, tried this like 30 years ago.    Would like to try the lowest dose.     Please advise  Trinity Fraser RN     "

## 2021-05-14 NOTE — TELEPHONE ENCOUNTER
Contacted the patient and informed her the medication was sent to her pharmacy.  Antonietta Joy [place]

## 2021-05-14 NOTE — TELEPHONE ENCOUNTER
Unlikely that physical therapy will help with the compression fx.    The calcitonin nose spray can help.    Can offer norco for acute pain as well as a trial of celebrex to see if her stomach would tolerate that better than ibuprofen.    Both sent to her pharmacy and she can choose to fill or not.   May need to see someone else next week if pain does not start to show improvement.

## 2021-05-17 NOTE — PROGRESS NOTES
(S22.070A) Compression fracture of T9 vertebra, initial encounter (H)  (primary encounter diagnosis)  Comment: Ongoing  Plan: Adult Endocrinology Referral,         HYDROcodone-acetaminophen (NORCO) 5-325 MG         tablet        Refilled x 30 tabs  Calcitonin helping along with voltaren gel    (M80.80XD) Localized osteoporosis with current pathological fracture with routine healing, subsequent encounter  Comment: Ongoing  Plan: Adult Endocrinology Referral        Patient has not tolerated GI side effects of fosamax in the past. On calcitonin. Referral for potential prolia injection.      Susan Dyson is a 71 year old who presents for the following health issues     HPI Follow up after ER visit for T9 vertebra compression fracture. Patient continues to experience pain. She cannot lie flat, reports sleeping elevated on couch. Has noticed some pain relief following acupuncture therapy last week. Using Tylenol on schedule and voltaren gel 4 times a day. She tried NSAIDS for pain relief but cannot tolerate the GI side effects.    ED/UC Followup:    Facility:  [facility]   Date of visit: 5/12/21, 5/18/21  Reason for visit: Acute midline thoracic back pain, compression fracture of T9 vertebra  Current Status: Symptoms have improved, was able to get better sleep last night        Labs reviewed in EPIC  BP Readings from Last 3 Encounters:   05/24/21 137/82   05/12/21 130/84   12/21/20 122/82    Wt Readings from Last 3 Encounters:   05/24/21 100.5 kg (221 lb 9.6 oz)   05/12/21 102.3 kg (225 lb 9.6 oz)   12/21/20 99.9 kg (220 lb 3.2 oz)                  Patient Active Problem List   Diagnosis     Thigh pain     Osteopenia     Sjogren's syndrome (H)     CARDIOVASCULAR SCREENING; LDL GOAL LESS THAN 160     Advanced directives, counseling/discussion     Palpitations     Acute conjunctivitis     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     Choroidal nevus, left     Acute pain of left shoulder     Displaced  fracture of neck of right radius     Compression fracture of T9 vertebra, initial encounter (H)     Past Surgical History:   Procedure Laterality Date     BIOPSY      right breast lumpectomy     BREAST LUMPECTOMY, RT/LT      right breast, benign     CATARACT IOL, RT/LT      bilateral     ORTHOPEDIC SURGERY  2018    right radius        Social History     Tobacco Use     Smoking status: Former Smoker     Quit date: 1984     Years since quittin.4     Smokeless tobacco: Never Used     Tobacco comment: nonsmoking hosuehold   Substance Use Topics     Alcohol use: Yes     Comment: 1 glass of wine one/two times a week     Family History   Problem Relation Age of Onset     C.A.D. Mother      Osteoporosis Mother      Hypertension Mother      Heart Disease Mother         CAD, CHF     Coronary Artery Disease Mother      Hyperlipidemia Mother      Diabetes Father      Hypertension Father      Cancer Brother         liver     Diabetes Brother      Diabetes Sister      Breast Cancer Sister 50     Hypertension Sister      Cancer Sister 72        non-hodgkins lymphoma     Cerebrovascular Disease Maternal Grandmother      Diabetes Paternal Grandfather      Diabetes Daughter         type 2     Connective Tissue Disorder Other         2 cousins and niece with MS; aunt with lupus     Rheumatoid Arthritis Cousin      Diabetes Niece      Hyperlipidemia Sister      Other Cancer Brother         Liver     Other Cancer Sister         non-hodgkins lymphoma     Thyroid Disease Sister      Osteoporosis Sister          Current Outpatient Medications   Medication Sig Dispense Refill     acetaminophen (TYLENOL) 500 MG tablet Take 500-1,000 mg by mouth every 6 hours as needed for mild pain       calcitonin, salmon, (MIACALCIN) 200 UNIT/ACT nasal spray Spray 1 spray into one nostril alternating nostrils daily Alternate nostril each day. 3.7 mL 0     Calcium Carbonate-Vitamin D (CALCIUM 600 + D OR) Take 1,200 mg by mouth daily         "carboxymethylcellulose (CELLUVISC/REFRESH LIQUIGEL) 1 % ophthalmic solution Place 1 drop into both eyes 3 times daily       cetirizine (ZYRTEC) 10 MG tablet Take 10 mg by mouth daily       cyclobenzaprine (FLEXERIL) 5 MG tablet Take 1-2 tablets (5-10 mg) by mouth 3 times daily as needed for muscle spasms 30 tablet 0     diclofenac (VOLTAREN) 1 % topical gel Place onto the skin 4 times daily 100 g 6     HYDROcodone-acetaminophen (NORCO) 5-325 MG tablet Take 1 tablet by mouth every 6 hours as needed for severe pain 30 tablet 0     MAGNESIUM PO Take 250 mg by mouth daily        VITAMIN C 500 MG OR TABS 1 TABLET  DAILY       VITAMIN E 400 UNIT PO CAPS 1 CAPSULE DAILY       Acetaminophen (TYLENOL PO) Take 325 mg by mouth       celecoxib (CELEBREX) 100 MG capsule Take 1 capsule (100 mg) by mouth 2 times daily (Patient not taking: Reported on 5/24/2021) 60 capsule 0     SUDAFED 30 MG OR TABS 1 TABLET EVERY 4 TO 6 HOURS AS NEEDED          Review of Systems   Constitutional, HEENT, cardiovascular, pulmonary, gi and gu systems are negative, except as otherwise noted.      Objective    /82   Pulse 91   Temp 98.5  F (36.9  C) (Oral)   Resp 20   Ht 1.651 m (5' 5\")   Wt 100.5 kg (221 lb 9.6 oz)   BMI 36.88 kg/m    Body mass index is 36.88 kg/m .  Physical Exam   GENERAL: healthy, alert and no distress  EYES: Eyes grossly normal to inspection, PERRL and conjunctivae and sclerae normal  MS: no gross musculoskeletal defects noted, no edema  SKIN: no suspicious lesions or rashes  PSYCH: mentation appears normal, affect normal/bright    Reviewed xray and MRI from URGENT CARE and ED visits            "

## 2021-05-19 ENCOUNTER — NURSE TRIAGE (OUTPATIENT)
Dept: NURSING | Facility: CLINIC | Age: 71
End: 2021-05-19

## 2021-05-19 DIAGNOSIS — S22.070A COMPRESSION FRACTURE OF T9 VERTEBRA, INITIAL ENCOUNTER (H): ICD-10-CM

## 2021-05-19 RX ORDER — HYDROCODONE BITARTRATE AND ACETAMINOPHEN 5; 325 MG/1; MG/1
1 TABLET ORAL EVERY 6 HOURS PRN
Qty: 18 TABLET | Refills: 0 | Status: SHIPPED | OUTPATIENT
Start: 2021-05-19 | End: 2021-05-22

## 2021-05-19 NOTE — TELEPHONE ENCOUNTER
Provider:  Are you willing to refill the requested Norco or should she be seen?  Thank you. Sabiha Geiger R.N.

## 2021-05-19 NOTE — TELEPHONE ENCOUNTER
Karis is calling for a refill for prescription and states that she had a compression fracture in vertebrae.  Karis was seen in urgent care.  Karis was prescribed Norco and is not taking regularly.  Celebrex was picked up on Monday.  Injury occurred about two weeks ago.  Karis is requesting a refill for [place] for pain.  Karis took first dose of Celebrex on Monday and caused gas.  Karis states that she is stopping celebrex.  Please refill and  phone Karis when this has been completed.      COVID 19 Nurse Triage Plan/Patient Instructions    Please be aware that novel coronavirus (COVID-19) may be circulating in the community. If you develop symptoms such as fever, cough, or SOB or if you have concerns about the presence of another infection including coronavirus (COVID-19), please contact your health care provider or visit [email or web address].     Disposition/Instructions    Home care recommended. Follow home care protocol based instructions.    Thank you for taking steps to prevent the spread of this virus.  o Limit your contact with others.  o Wear a simple mask to cover your cough.  o Wash your hands well and often.    Resources    [facility]: About COVID-19: [email or web address]    CDC: What to Do If You're Sick: www.cdc.gov/coronavirus/2019-ncov/about/steps-when-sick.html    CDC: Ending Home Isolation: www.cdc.gov/coronavirus/2019-ncov/hcp/disposition-in-home-patients.html     CDC: Caring for Someone: www.cdc.gov/coronavirus/2019-ncov/if-you-are-sick/care-for-someone.html     [facility]: Interim Guidance for Hospital Discharge to Home: www.health.[facility].mn.us/diseases/coronavirus/hcp/hospdischarge.pdf    [facility] clinical trials (COVID-19 research studies): [email or web address]-trials     Below are the COVID-19 hotlines at the [facility] ([facility]). Interpreters are available.   o For health questions: Call 162-385-8978 or 1-775.232.1713 (7 a.m. to  7 p.m.)  o For questions about schools and childcare: Call 820-730-5382 or 1-323.465.5821 (7 a.m. to 7 p.m.)                     Reason for Disposition    Caller requesting a NON-URGENT new prescription or refill and triager unable to refill per department policy    Additional Information    Negative: MORE THAN A DOUBLE DOSE of a prescription or over-the-counter (OTC) drug    Negative: DOUBLE DOSE (an extra dose or lesser amount) of over-the-counter (OTC) drug and any symptoms (e.g., dizziness, nausea, pain, sleepiness)    Negative: DOUBLE DOSE (an extra dose or lesser amount) of prescription drug and any symptoms (e.g., dizziness, nausea, pain, sleepiness)    Negative: Took another person's prescription drug    Negative: DOUBLE DOSE (an extra dose or lesser amount) of prescription drug and NO symptoms (Exception: a double dose of antibiotics)    Negative: Diabetes drug error or overdose (e.g., took wrong type of insulin or took extra dose)    Negative: Caller has medication question about med not prescribed by PCP and triager unable to answer question (e.g., compatibility with other med, storage)    Negative: Request for URGENT new prescription or refill of 'essential' medication (i.e., likelihood of harm to patient if not taken) and triager unable to fill per department policy    Negative: Prescription not at pharmacy and was prescribed today by PCP    Negative: Pharmacy calling with prescription questions and triager unable to answer question    Negative: Caller has urgent medication question about med that PCP prescribed and triager unable to answer question    Negative: Caller has NON-URGENT medication question about med that PCP prescribed and triager unable to answer question    Protocols used: MEDICATION QUESTION CALL-A-[place]

## 2021-05-19 NOTE — TELEPHONE ENCOUNTER
Routing refill request to provider for review/approval because:  Drug not on the [facility] refill protocol     Cynthia PADGETTN, RN

## 2021-05-24 ENCOUNTER — OFFICE VISIT (OUTPATIENT)
Dept: FAMILY MEDICINE | Facility: CLINIC | Age: 71
End: 2021-05-24
Payer: COMMERCIAL

## 2021-05-24 VITALS
HEART RATE: 91 BPM | SYSTOLIC BLOOD PRESSURE: 137 MMHG | DIASTOLIC BLOOD PRESSURE: 82 MMHG | HEIGHT: 65 IN | TEMPERATURE: 98.5 F | BODY MASS INDEX: 36.92 KG/M2 | WEIGHT: 221.6 LBS | RESPIRATION RATE: 20 BRPM

## 2021-05-24 DIAGNOSIS — S22.070A COMPRESSION FRACTURE OF T9 VERTEBRA, INITIAL ENCOUNTER (H): Primary | ICD-10-CM

## 2021-05-24 DIAGNOSIS — M80.80XD LOCALIZED OSTEOPOROSIS WITH CURRENT PATHOLOGICAL FRACTURE WITH ROUTINE HEALING, SUBSEQUENT ENCOUNTER: ICD-10-CM

## 2021-05-24 PROCEDURE — 99213 OFFICE O/P EST LOW 20 MIN: CPT | Performed by: FAMILY MEDICINE

## 2021-05-24 RX ORDER — ACETAMINOPHEN 500 MG
500-1000 TABLET ORAL EVERY 6 HOURS PRN
COMMUNITY
End: 2021-11-09

## 2021-05-24 RX ORDER — HYDROCODONE BITARTRATE AND ACETAMINOPHEN 5; 325 MG/1; MG/1
1 TABLET ORAL EVERY 6 HOURS PRN
Qty: 30 TABLET | Refills: 0 | Status: SHIPPED | OUTPATIENT
Start: 2021-05-24 | End: 2021-11-09

## 2021-05-24 RX ORDER — HYDROCODONE BITARTRATE AND ACETAMINOPHEN 5; 325 MG/1; MG/1
1 TABLET ORAL EVERY 6 HOURS PRN
COMMUNITY
End: 2021-05-24

## 2021-05-24 ASSESSMENT — MIFFLIN-ST. JEOR: SCORE: 1531.05

## 2021-05-24 ASSESSMENT — PAIN SCALES - GENERAL: PAINLEVEL: MILD PAIN (3)

## 2021-05-24 NOTE — NURSING NOTE
"Chief Complaint   Patient presents with     RECHECK       Initial /82   Pulse 91   Temp 98.5  F (36.9  C) (Oral)   Resp 20   Ht 1.651 m (5' 5\")   Wt 100.5 kg (221 lb 9.6 oz)   BMI 36.88 kg/m   Estimated body mass index is 36.88 kg/m  as calculated from the following:    Height as of this encounter: 1.651 m (5' 5\").    Weight as of this encounter: 100.5 kg (221 lb 9.6 oz).  Medication Reconciliation: complete  Pipe Best CMA    "

## 2021-05-25 ENCOUNTER — TRANSFERRED RECORDS (OUTPATIENT)
Dept: HEALTH INFORMATION MANAGEMENT | Facility: CLINIC | Age: 71
End: 2021-05-25

## 2021-06-03 ENCOUNTER — OFFICE VISIT (OUTPATIENT)
Dept: RHEUMATOLOGY | Facility: CLINIC | Age: 71
End: 2021-06-03
Payer: COMMERCIAL

## 2021-06-03 VITALS
OXYGEN SATURATION: 95 % | HEART RATE: 98 BPM | WEIGHT: 219.4 LBS | DIASTOLIC BLOOD PRESSURE: 85 MMHG | BODY MASS INDEX: 36.55 KG/M2 | SYSTOLIC BLOOD PRESSURE: 146 MMHG | HEIGHT: 65 IN

## 2021-06-03 DIAGNOSIS — M35.01 SJOGREN'S SYNDROME WITH KERATOCONJUNCTIVITIS SICCA (H): ICD-10-CM

## 2021-06-03 DIAGNOSIS — Z78.0 POST-MENOPAUSAL: ICD-10-CM

## 2021-06-03 DIAGNOSIS — M80.00XA AGE-RELATED OSTEOPOROSIS WITH CURRENT PATHOLOGICAL FRACTURE, INITIAL ENCOUNTER: Primary | ICD-10-CM

## 2021-06-03 DIAGNOSIS — Z92.29 HISTORY OF BISPHOSPHONATE THERAPY: ICD-10-CM

## 2021-06-03 LAB
CALCIUM SERPL-MCNC: 9.4 MG/DL (ref 8.5–10.1)
CREAT SERPL-MCNC: 0.75 MG/DL (ref 0.52–1.04)
GFR SERPL CREATININE-BSD FRML MDRD: 81 ML/MIN/{1.73_M2}
PTH-INTACT SERPL-MCNC: 50 PG/ML (ref 18–80)

## 2021-06-03 PROCEDURE — 82565 ASSAY OF CREATININE: CPT | Performed by: INTERNAL MEDICINE

## 2021-06-03 PROCEDURE — 36415 COLL VENOUS BLD VENIPUNCTURE: CPT | Performed by: INTERNAL MEDICINE

## 2021-06-03 PROCEDURE — 82306 VITAMIN D 25 HYDROXY: CPT | Performed by: INTERNAL MEDICINE

## 2021-06-03 PROCEDURE — 83970 ASSAY OF PARATHORMONE: CPT | Performed by: INTERNAL MEDICINE

## 2021-06-03 PROCEDURE — 82310 ASSAY OF CALCIUM: CPT | Performed by: INTERNAL MEDICINE

## 2021-06-03 PROCEDURE — 99214 OFFICE O/P EST MOD 30 MIN: CPT | Performed by: INTERNAL MEDICINE

## 2021-06-03 ASSESSMENT — MIFFLIN-ST. JEOR: SCORE: 1521.07

## 2021-06-03 NOTE — PROGRESS NOTES
Rheumatology Clinic Visit      Karis Youssef MRN# [id number]   YOB: 1950 Age: 71 year old      Date of visit: 6/03/21   PCP: Dr. Josee Carpenter    Chief Complaint   Patient presents with:  Sjogren's syndrome with keratoconjunctivitis sicca    Assessment and Plan     1. Sjogren's Syndrome (ZELDA+, SSA+, sicca symptoms): Diagnosed in 2009 by Dr. Delatorre. Currently doing well with frequent sips of water and artificial tears.      2.  History of bilateral knee pain:  Osteoarthritis and meniscal tears based on patient history and following with orthopedic surgeon at [facility].  Symptoms are degenerative in nature.  She does not limit her activities because of knee pain.      3.  Osteoporosis: Vertebral compression fracture seen on 5/18/2021 MRI of the lumbar spine performed at [facility].  Previously had a diagnosis of osteopenia based on 2017 DEXA.  Check labs today and if okay, and after given approval to start a bisphosphonate by her dentist, will start zoledronic acid.  She has a history of being on Fosamax in the past because of significant heartburn; she also reports having easy heartburn with any NSAID.  Therefore, will avoid oral bisphosphonates and start zoledronic acid.  - If labs are okay and after given approval to start a bisphosphonate by her dentist: Start zoledronic acid 5 mg IV once yearly   - Continue vitamin D 1000 IU daily  - Continue calcium 1200 mg daily  - She is to follow-up with her dentist to get approval to start a bisphosphonate  - Labs today: Calcium, vitamin D, creatinine, PTH   - DEXA ordered; phone number provided to the patient so that she may call to schedule    # Bisphosphonate risks and side effects:  Risks and side effects include esophageal irritation, heartburn, osteonecrosis of the jaw (most often in people with dental disease or a recent dental procedure), and atypical femoral fractures.  IV bisphosphonates can cause a flu-like illness and bone pain  lasting up to 2-3 days; premedication with acetaminophen will often prevent or lessen these symptoms. Unusual side effects that have been reported include occular symptoms such as uveitis, keartitis, optic neuritis, and orbital swelling.  Oral bisphosphonates should not be used in patients with esophageal problems (such as strictures, achalasia, or severe dysmotility, varices), malabsorption, or the inability to sit upright.  Oral and IV bisphosphonates should not be used if the CrCl is less than 25-40mL/min, or the patient is pregnant or breastfeeding.  Prior to starting a biosphosphonate, invasive dental work should be completed if needed, and vitamin D level should be adequate.    - COVID-19: has received the [employer] ([employer]) COVID-19 vaccine on 3/10/2021    Total minutes spent in evaluation with patient, documentation, , and review of pertinent studies and chart notes: 34     Ms. Youssef verbalized agreement with and understanding of the rational for the diagnosis and treatment plan.  All questions were answered to best of my ability and the patient's satisfaction. Ms. Youssef was advised to contact the clinic with any questions that may arise after the clinic visit.      Thank you for involving me in the care of the patient    Return to clinic: 1 year, sooner if needed    HPI   Karis Youssef is a 71 year old female with a past medical history significant for Sjogren's syndrome and osteopenia who is seen for rheumatology follow-up    4/23/2013 rheumatology clinic note by Dr. Kwabena Delatorre documents that the patient was following up for a positive SSA and sicca symptoms..  Positive ZELDA.  Positive SSB.  Does not have evidence of other connective tissue disease.  No arthritis, vasculitis, and her review of systems for respiratory, cardiovascular, and CNS is normal.  No rash or parotid swelling.  No new neurologic symptoms.  No lymphadenopathy.  He advised lip biopsy to confirm.    10/4/2018  clinic note by Dr. Carpenter documents Sjogren's with possibly increasing symptoms so was referred to rheumatology.    2/19/2019: Ms. Youssef reports that she has had joint issues most of her life. TMJ tx'd by not chewing gum.  Bilateral knee pain: started 20 yrs ago with torn meniscus, and now bone on bone and was told she may need TKA. Left knee sometimes gives out so she uses hiking poles; was able to hike all over [place] going up and down stairs with worsening knee pain that resolved with rest and acupuncture.  Plans to retire in May 2019.  Fell last year with distal radius and distal ulna fracture s/p ORIF.   Some stiffness in the dorsal midfeet for 10 minutes; and sometimes in the left shoulder that resolves within 10 minutes. Hx of left frozen shoulder that resolved with physical therapy. There was a concern for COPD at one point but she saw a pulmonologist who told her that she does not have COPD.  Thicker respiratory secretions in the morning that takes a little bit of time to get them going; she was told that she may have postnasal drip by her allergist and is taking Claritin for this.  Dry mouth is treated with frequent sips of water; she reports seeing a dentist twice yearly and has not had recent cavities.  Dry eyes are treated with artificial tears 3 times daily; she does not notice a difference between the ones that are preservative-free versus the ones that have preservative.  She tells me that her ophthalmologist recommended she take Claritin for allergies.    Today, 6/3/2021: She reports that she is coming in today because of osteoporosis.  Regarding Sjogren's, she says that everything is stable.  She continues to use frequent sips of water and reports having fairly good oral dentition but does note having a history of TMJ in the past and does grind her teeth.  She follows with the dentist regularly.  She also continues to use artificial tears 1-2 times per day with good control of her dry eyes most  recently she was installing a fence and bending over to put screws in at the bottom of the fence; she felt some pain in her back that was evaluated and found to be a vertebral compression fracture.  She has followed with a surgeon who told her that the compression fracture have so far appeared stable and therefore no intervention from surgical or interventional radiology perspective is needed at this time.  She is currently on calcitonin that has helped some with the pain.  She finds it Tylenol 1000 mg twice daily is also effective for her back pain.  She is here to discuss osteoporosis treatment.  She notes in the past she has had significant heartburn with Fosamax and any NSAID.    Denies fevers, chills, nausea, vomiting, constipation, diarrhea. No abdominal pain. No chest pain/pressure, palpitations, or shortness of breath. No LE swelling. No neck pain. No oral or nasal sores.  No rash. No photosensitivity or photophobia.  No history of serositis.  No history of Raynaud's Phenomenon.  No known neurologic disorder.  No known renal disorder.      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 [employer]    ROS   12 point review of system was completed and negative except as noted in the HPI     Active Problem List     Patient Active Problem List   Diagnosis     Thigh pain     Osteopenia     Sjogren's syndrome (H)     CARDIOVASCULAR SCREENING; LDL GOAL LESS THAN 160     Advanced directives, counseling/discussion     Palpitations     Acute conjunctivitis     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     Choroidal nevus, left     Acute pain of left shoulder     Displaced fracture of neck of right radius     Compression fracture of T9 vertebra, initial encounter (H)     Past Medical History     Past Medical History:   Diagnosis Date     Abnormal Pap smear 1995    had colpo 1995-hyperplasia, nl since     Arthritis      Disturbance of skin  sensation      Osteopenia      Osteopenia      Seasonal allergies      Sjogren's syndrome (H)      Past Surgical History     Past Surgical History:   Procedure Laterality Date     BIOPSY  2000    right breast lumpectomy     BREAST LUMPECTOMY, RT/LT      right breast, benign     CATARACT IOL, RT/LT      bilateral     ORTHOPEDIC SURGERY  01/2018    right radius      Allergy     Allergies   Allergen Reactions     Erythromycin Nausea     Tongue turned black.     Nsaids GI Disturbance     Gastritis        Current Medication List     Current Outpatient Medications   Medication Sig     Acetaminophen (TYLENOL PO) Take 325 mg by mouth     calcitonin, salmon, (MIACALCIN) 200 UNIT/ACT nasal spray Spray 1 spray into one nostril alternating nostrils daily Alternate nostril each day.     Calcium Carbonate-Vitamin D (CALCIUM 600 + D OR) Take 1,200 mg by mouth daily      carboxymethylcellulose (CELLUVISC/REFRESH LIQUIGEL) 1 % ophthalmic solution Place 1 drop into both eyes 3 times daily     cetirizine (ZYRTEC) 10 MG tablet Take 10 mg by mouth daily     cyclobenzaprine (FLEXERIL) 5 MG tablet Take 1-2 tablets (5-10 mg) by mouth 3 times daily as needed for muscle spasms     diclofenac (VOLTAREN) 1 % topical gel Place onto the skin 4 times daily     HYDROcodone-acetaminophen (NORCO) 5-325 MG tablet Take 1 tablet by mouth every 6 hours as needed for severe pain     MAGNESIUM PO Take 250 mg by mouth daily      SUDAFED 30 MG OR TABS 1 TABLET EVERY 4 TO 6 HOURS AS NEEDED     VITAMIN C 500 MG OR TABS 1 TABLET  DAILY     VITAMIN E 400 UNIT PO CAPS 1 CAPSULE DAILY     acetaminophen (TYLENOL) 500 MG tablet Take 500-1,000 mg by mouth every 6 hours as needed for mild pain     celecoxib (CELEBREX) 100 MG capsule Take 1 capsule (100 mg) by mouth 2 times daily (Patient not taking: Reported on 5/24/2021)     No current facility-administered medications for this visit.          Social History   See HPI    Family History     Family History   Problem  "Relation Age of Onset     C.A.D. Mother      Osteoporosis Mother      Hypertension Mother      Heart Disease Mother         CAD, CHF     Coronary Artery Disease Mother      Hyperlipidemia Mother      Diabetes Father      Hypertension Father      Cancer Brother         liver     Diabetes Brother      Diabetes Sister      Breast Cancer Sister 50     Hypertension Sister      Cancer Sister 72        non-hodgkins lymphoma     Cerebrovascular Disease Maternal Grandmother      Diabetes Paternal Grandfather      Diabetes Daughter         type 2     Connective Tissue Disorder Other         2 cousins and niece with MS; aunt with lupus     Rheumatoid Arthritis Cousin      Diabetes Niece      Hyperlipidemia Sister      Other Cancer Brother         Liver     Other Cancer Sister         non-hodgkins lymphoma     Thyroid Disease Sister      Osteoporosis Sister        Physical Exam     Temp Readings from Last 3 Encounters:   05/24/21 98.5  F (36.9  C) (Oral)   05/12/21 97.9  F (36.6  C) (Tympanic)   12/21/20 96.8  F (36  C) (Tympanic)     BP Readings from Last 5 Encounters:   06/03/21 (!) 146/85   05/24/21 137/82   05/12/21 130/84   12/21/20 122/82   11/21/19 (!) 141/77     Pulse Readings from Last 1 Encounters:   06/03/21 98     Resp Readings from Last 1 Encounters:   05/24/21 20     Estimated body mass index is 36.51 kg/m  as calculated from the following:    Height as of this encounter: 1.651 m (5' 5\").    Weight as of this encounter: 99.5 kg (219 lb 6.4 oz).      GEN: NAD. Healthy appearing adult.   HEENT:  Anicteric, noninjected sclera. No obvious external lesions of the ear and nose. Hearing intact.  PULM: No increased work of breathing  SKIN: No rash or jaundice seen  PSYCH: Alert. Appropriate.        Labs / Imaging (select studies)     ZELDA by EIA positive in 2009    CBC  Recent Labs   Lab Test 02/19/19  0951 01/17/18  1024 08/07/13  1232   WBC 7.5 7.9 6.6   RBC 4.38 4.17 4.38   HGB 12.8 12.0 12.8   HCT 39.9 38.4 38.4   MCV " 91 92 88   RDW 13.7 13.7 12.6    288 291   MCH 29.2 28.8 29.3   MCHC 32.1 31.3* 33.3   NEUTROPHIL 50.9  --   --    LYMPH 35.3  --   --    MONOCYTE 9.5  --   --    EOSINOPHIL 3.8  --   --    BASOPHIL 0.5  --   --    ANEU 3.8  --   --    ALYM 2.7  --   --    MATHEUS 0.7  --   --    AEOS 0.3  --   --    ABAS 0.0  --   --      CMP  Recent Labs   Lab Test 12/21/20  0843 10/08/19  1003 02/19/19  0951 08/14/17  0953 05/28/15  0831   NA  --   --  141  --   --    POTASSIUM  --   --  4.4  --   --    CHLORIDE  --   --  107  --   --    CO2  --   --  27  --   --    ANIONGAP  --   --  7  --   --    GLC 99 106* 99 99 94   BUN  --   --  17  --   --    CR  --   --  0.64  --   --    GFRESTIMATED  --   --  >90  --   --    GFRESTBLACK  --   --  >90  --   --    VINNY  --   --  9.6  --   --    BILITOTAL  --   --  0.3  --   --    ALBUMIN  --   --  3.6  --   --    PROTTOTAL  --   --  7.1  --   --    ALKPHOS  --   --  103  --   --    AST  --   --  21  --   --    ALT  --   --  28  --   --      Calcium/VitaminD  Recent Labs   Lab Test 02/19/19  0951   VINNY 9.6     TSH/T4  Recent Labs   Lab Test 08/14/17  0953 08/07/13  1232   TSH 2.62 2.52     Hepatitis C  Recent Labs   Lab Test 05/18/16  0955   HCVAB Nonreactive   Assay performance characteristics have not been established for newborns,   infants, and children       Lyme ab screening  Recent Labs   Lab Test 05/18/16  0955   LYMEGM 0.06     Immunization History     Immunization History   Administered Date(s) Administered     COVID-19,PF,[place] 03/10/2021     Influenza (intradermal) 11/28/2011     Pneumo Conj 13-V (2010&after) 01/11/2018     Pneumococcal 23 valent 10/04/2018     TD (ADULT, 7+) 08/01/2005     TDAP Vaccine (Adacel) 04/18/2012          Chart documentation done in part with Dragon Voice recognition Software. Although reviewed after completion, some word and grammatical error may remain.    Finesse Saunders MD

## 2021-06-03 NOTE — PATIENT INSTRUCTIONS
RHEUMATOLOGY    Dr. Finesse Saunders    [facility]  [address]  [place] [address]    Our new phone number for Rheumatology is [phone], this number will be able to help you schedule appointments for Dr. Saunders or if you have any message you would like sent to us.    Thank you for choosing [facility]!    Anne Costello CMA Rheumatology            For your Dexa scan (bone scan) please call and schedule your appointment at    [facility]  [phone]

## 2021-06-04 ENCOUNTER — MYC MEDICAL ADVICE (OUTPATIENT)
Dept: RHEUMATOLOGY | Facility: CLINIC | Age: 71
End: 2021-06-04

## 2021-06-04 DIAGNOSIS — M80.00XD AGE-RELATED OSTEOPOROSIS WITH CURRENT PATHOLOGICAL FRACTURE WITH ROUTINE HEALING: Primary | ICD-10-CM

## 2021-06-04 DIAGNOSIS — Z11.59 SCREENING FOR VIRAL DISEASE: ICD-10-CM

## 2021-06-04 LAB — DEPRECATED CALCIDIOL+CALCIFEROL SERPL-MC: 35 UG/L (ref 20–75)

## 2021-06-10 PROBLEM — M80.00XD AGE-RELATED OSTEOPOROSIS WITH CURRENT PATHOLOGICAL FRACTURE WITH ROUTINE HEALING: Status: ACTIVE | Noted: 2021-06-10

## 2021-06-10 RX ORDER — ZOLEDRONIC ACID 5 MG/100ML
5 INJECTION, SOLUTION INTRAVENOUS ONCE
Status: CANCELLED
Start: 2021-06-10 | End: 2021-06-10

## 2021-06-10 RX ORDER — HEPARIN SODIUM,PORCINE 10 UNIT/ML
5 VIAL (ML) INTRAVENOUS
Status: CANCELLED | OUTPATIENT
Start: 2021-06-10

## 2021-06-10 RX ORDER — EPINEPHRINE 1 MG/ML
0.3 INJECTION, SOLUTION, CONCENTRATE INTRAVENOUS EVERY 5 MIN PRN
Status: CANCELLED | OUTPATIENT
Start: 2021-06-10

## 2021-06-10 RX ORDER — DIPHENHYDRAMINE HYDROCHLORIDE 50 MG/ML
50 INJECTION INTRAMUSCULAR; INTRAVENOUS
Status: CANCELLED
Start: 2021-06-10

## 2021-06-10 RX ORDER — ALBUTEROL SULFATE 90 UG/1
1-2 AEROSOL, METERED RESPIRATORY (INHALATION)
Status: CANCELLED
Start: 2021-06-10

## 2021-06-10 RX ORDER — NALOXONE HYDROCHLORIDE 0.4 MG/ML
0.2 INJECTION, SOLUTION INTRAMUSCULAR; INTRAVENOUS; SUBCUTANEOUS
Status: CANCELLED | OUTPATIENT
Start: 2021-06-10

## 2021-06-10 RX ORDER — ALBUTEROL SULFATE 5 MG/ML
2.5 SOLUTION RESPIRATORY (INHALATION)
Status: CANCELLED | OUTPATIENT
Start: 2021-06-10

## 2021-06-10 RX ORDER — HEPARIN SODIUM (PORCINE) LOCK FLUSH IV SOLN 100 UNIT/ML 100 UNIT/ML
5 SOLUTION INTRAVENOUS
Status: CANCELLED | OUTPATIENT
Start: 2021-06-10

## 2021-06-10 RX ORDER — MEPERIDINE HYDROCHLORIDE 25 MG/ML
25 INJECTION INTRAMUSCULAR; INTRAVENOUS; SUBCUTANEOUS EVERY 30 MIN PRN
Status: CANCELLED | OUTPATIENT
Start: 2021-06-10

## 2021-06-11 ENCOUNTER — ANCILLARY PROCEDURE (OUTPATIENT)
Dept: BONE DENSITY | Facility: CLINIC | Age: 71
End: 2021-06-11
Attending: INTERNAL MEDICINE
Payer: COMMERCIAL

## 2021-06-11 DIAGNOSIS — Z78.0 POST-MENOPAUSAL: ICD-10-CM

## 2021-06-11 DIAGNOSIS — Z92.29 HISTORY OF BISPHOSPHONATE THERAPY: ICD-10-CM

## 2021-06-11 DIAGNOSIS — M80.00XA AGE-RELATED OSTEOPOROSIS WITH CURRENT PATHOLOGICAL FRACTURE, INITIAL ENCOUNTER: ICD-10-CM

## 2021-06-11 DIAGNOSIS — M35.01 SJOGREN'S SYNDROME WITH KERATOCONJUNCTIVITIS SICCA (H): ICD-10-CM

## 2021-06-11 PROCEDURE — 77085 DXA BONE DENSITY AXL VRT FX: CPT | Performed by: INTERNAL MEDICINE

## 2021-06-27 ENCOUNTER — MYC MEDICAL ADVICE (OUTPATIENT)
Dept: RHEUMATOLOGY | Facility: CLINIC | Age: 71
End: 2021-06-27

## 2021-06-29 DIAGNOSIS — Z11.59 SCREENING FOR VIRAL DISEASE: ICD-10-CM

## 2021-06-29 LAB
SARS-COV-2 RNA RESP QL NAA+PROBE: NORMAL
SPECIMEN SOURCE: NORMAL

## 2021-06-29 PROCEDURE — U0003 INFECTIOUS AGENT DETECTION BY NUCLEIC ACID (DNA OR RNA); SEVERE ACUTE RESPIRATORY SYNDROME CORONAVIRUS 2 (SARS-COV-2) (CORONAVIRUS DISEASE [COVID-19]), AMPLIFIED PROBE TECHNIQUE, MAKING USE OF HIGH THROUGHPUT TECHNOLOGIES AS DESCRIBED BY CMS-2020-01-R: HCPCS | Performed by: INTERNAL MEDICINE

## 2021-06-29 PROCEDURE — U0005 INFEC AGEN DETEC AMPLI PROBE: HCPCS | Performed by: INTERNAL MEDICINE

## 2021-06-29 NOTE — TELEPHONE ENCOUNTER
Called and advised patient to stop her calcitonin now. Patient stated she did not take it yesterday and will hold it. She has no other questions.    Graham BORJAS RN....6/29/2021 4:00 PM

## 2021-06-29 NOTE — TELEPHONE ENCOUNTER
RN: Please call to ensure that she gets the following information: stop calcitonin now.     Finesse Saunders MD  6/29/2021 3:49 PM

## 2021-06-29 NOTE — TELEPHONE ENCOUNTER
Per uptodate:        Forwarded to Dr. Saunders to advise if patient needs to hold calcitonin.    Graham BORJAS RN....6/29/2021 2:19 PM

## 2021-06-30 LAB
LABORATORY COMMENT REPORT: NORMAL
SARS-COV-2 RNA RESP QL NAA+PROBE: NEGATIVE
SPECIMEN SOURCE: NORMAL

## 2021-07-01 ENCOUNTER — INFUSION THERAPY VISIT (OUTPATIENT)
Dept: INFUSION THERAPY | Facility: CLINIC | Age: 71
End: 2021-07-01
Payer: COMMERCIAL

## 2021-07-01 VITALS
OXYGEN SATURATION: 98 % | SYSTOLIC BLOOD PRESSURE: 131 MMHG | RESPIRATION RATE: 16 BRPM | DIASTOLIC BLOOD PRESSURE: 84 MMHG | HEART RATE: 79 BPM | BODY MASS INDEX: 36.61 KG/M2 | WEIGHT: 220 LBS | TEMPERATURE: 97.8 F

## 2021-07-01 DIAGNOSIS — M80.00XD AGE-RELATED OSTEOPOROSIS WITH CURRENT PATHOLOGICAL FRACTURE WITH ROUTINE HEALING: Primary | ICD-10-CM

## 2021-07-01 PROCEDURE — 96365 THER/PROPH/DIAG IV INF INIT: CPT | Performed by: NURSE PRACTITIONER

## 2021-07-01 PROCEDURE — 99207 PR NO CHARGE LOS: CPT

## 2021-07-01 RX ORDER — ALBUTEROL SULFATE 0.83 MG/ML
2.5 SOLUTION RESPIRATORY (INHALATION)
Status: CANCELLED | OUTPATIENT
Start: 2021-07-01

## 2021-07-01 RX ORDER — HEPARIN SODIUM (PORCINE) LOCK FLUSH IV SOLN 100 UNIT/ML 100 UNIT/ML
5 SOLUTION INTRAVENOUS
Status: CANCELLED | OUTPATIENT
Start: 2021-07-01

## 2021-07-01 RX ORDER — ZOLEDRONIC ACID 5 MG/100ML
5 INJECTION, SOLUTION INTRAVENOUS ONCE
Status: COMPLETED | OUTPATIENT
Start: 2021-07-01 | End: 2021-07-01

## 2021-07-01 RX ORDER — EPINEPHRINE 1 MG/ML
0.3 INJECTION, SOLUTION INTRAMUSCULAR; SUBCUTANEOUS EVERY 5 MIN PRN
Status: CANCELLED | OUTPATIENT
Start: 2021-07-01

## 2021-07-01 RX ORDER — HEPARIN SODIUM,PORCINE 10 UNIT/ML
5 VIAL (ML) INTRAVENOUS
Status: CANCELLED | OUTPATIENT
Start: 2021-07-01

## 2021-07-01 RX ORDER — DIPHENHYDRAMINE HYDROCHLORIDE 50 MG/ML
50 INJECTION INTRAMUSCULAR; INTRAVENOUS
Status: CANCELLED
Start: 2021-07-01

## 2021-07-01 RX ORDER — ALBUTEROL SULFATE 90 UG/1
1-2 AEROSOL, METERED RESPIRATORY (INHALATION)
Status: CANCELLED
Start: 2021-07-01

## 2021-07-01 RX ORDER — NALOXONE HYDROCHLORIDE 0.4 MG/ML
0.2 INJECTION, SOLUTION INTRAMUSCULAR; INTRAVENOUS; SUBCUTANEOUS
Status: CANCELLED | OUTPATIENT
Start: 2021-07-01

## 2021-07-01 RX ORDER — METHYLPREDNISOLONE SODIUM SUCCINATE 125 MG/2ML
125 INJECTION, POWDER, LYOPHILIZED, FOR SOLUTION INTRAMUSCULAR; INTRAVENOUS
Status: CANCELLED
Start: 2021-07-01

## 2021-07-01 RX ORDER — ZOLEDRONIC ACID 5 MG/100ML
5 INJECTION, SOLUTION INTRAVENOUS ONCE
Status: CANCELLED
Start: 2021-07-01 | End: 2021-07-01

## 2021-07-01 RX ORDER — MEPERIDINE HYDROCHLORIDE 25 MG/ML
25 INJECTION INTRAMUSCULAR; INTRAVENOUS; SUBCUTANEOUS EVERY 30 MIN PRN
Status: CANCELLED | OUTPATIENT
Start: 2021-07-01

## 2021-07-01 RX ADMIN — Medication 250 ML: at 10:06

## 2021-07-01 RX ADMIN — ZOLEDRONIC ACID 5 MG: 0.05 INJECTION, SOLUTION INTRAVENOUS at 10:13

## 2021-07-01 ASSESSMENT — PAIN SCALES - GENERAL: PAINLEVEL: NO PAIN (0)

## 2021-07-01 NOTE — PROGRESS NOTES
Infusion Nursing Note:  Karis Youssef presents today for New Reclast.    Patient seen by provider today: No   present during visit today: Not Applicable.    Note: Patient oriented to infusion center and unit routines. Questions answered and patient verbalized understanding.       Intravenous Access:  Peripheral IV placed.    Treatment Conditions:  Lab Results   Component Value Date     02/19/2019                   Lab Results   Component Value Date    POTASSIUM 4.4 02/19/2019           Lab Results   Component Value Date    MAG 2.2 10/08/2019            Lab Results   Component Value Date    CR 0.75 06/03/2021                   Lab Results   Component Value Date    VINNY 9.4 06/03/2021                Lab Results   Component Value Date    BILITOTAL 0.3 02/19/2019           Lab Results   Component Value Date    ALBUMIN 3.6 02/19/2019                    Lab Results   Component Value Date    ALT 28 02/19/2019           Lab Results   Component Value Date    AST 21 02/19/2019       Results reviewed, labs MET treatment parameters, ok to proceed with treatment.      Post Infusion Assessment:  Patient tolerated infusion without incident.  Blood return noted pre and post infusion.  Site patent and intact, free from redness, edema or discomfort.  No evidence of extravasations.  Access discontinued per protocol.       Discharge Plan:   Discharge instructions reviewed with: Patient.  Patient and/or family verbalized understanding of discharge instructions and all questions answered.  Patient discharged in stable condition accompanied by: self.  Departure Mode: Ambulatory.      Stephanie Nino RN

## 2021-07-13 ENCOUNTER — TRANSFERRED RECORDS (OUTPATIENT)
Dept: HEALTH INFORMATION MANAGEMENT | Facility: CLINIC | Age: 71
End: 2021-07-13

## 2021-10-23 ENCOUNTER — HEALTH MAINTENANCE LETTER (OUTPATIENT)
Age: 71
End: 2021-10-23

## 2021-11-04 ENCOUNTER — MYC MEDICAL ADVICE (OUTPATIENT)
Dept: FAMILY MEDICINE | Facility: CLINIC | Age: 71
End: 2021-11-04

## 2021-11-05 ASSESSMENT — ENCOUNTER SYMPTOMS
PARESTHESIAS: 0
CHILLS: 0
HEARTBURN: 0
HEMATOCHEZIA: 0
COUGH: 0
WEAKNESS: 0
ABDOMINAL PAIN: 0
EYE PAIN: 0
FREQUENCY: 0
MYALGIAS: 0
HEADACHES: 0
SHORTNESS OF BREATH: 0
DYSURIA: 0
SORE THROAT: 0
BREAST MASS: 0
HEMATURIA: 0
JOINT SWELLING: 0
NERVOUS/ANXIOUS: 0
PALPITATIONS: 0
DIARRHEA: 0
ARTHRALGIAS: 0
CONSTIPATION: 0
FEVER: 0
DIZZINESS: 0
NAUSEA: 0

## 2021-11-05 ASSESSMENT — ACTIVITIES OF DAILY LIVING (ADL): CURRENT_FUNCTION: NO ASSISTANCE NEEDED

## 2021-11-09 ENCOUNTER — OFFICE VISIT (OUTPATIENT)
Dept: FAMILY MEDICINE | Facility: CLINIC | Age: 71
End: 2021-11-09
Payer: COMMERCIAL

## 2021-11-09 VITALS
HEIGHT: 64 IN | BODY MASS INDEX: 37.46 KG/M2 | WEIGHT: 219.4 LBS | HEART RATE: 94 BPM | TEMPERATURE: 98.4 F | SYSTOLIC BLOOD PRESSURE: 130 MMHG | OXYGEN SATURATION: 96 % | RESPIRATION RATE: 16 BRPM | DIASTOLIC BLOOD PRESSURE: 84 MMHG

## 2021-11-09 DIAGNOSIS — Z23 HIGH PRIORITY FOR 2019-NCOV VACCINE: ICD-10-CM

## 2021-11-09 DIAGNOSIS — Z12.31 ENCOUNTER FOR SCREENING MAMMOGRAM FOR BREAST CANCER: ICD-10-CM

## 2021-11-09 DIAGNOSIS — M80.00XD AGE-RELATED OSTEOPOROSIS WITH CURRENT PATHOLOGICAL FRACTURE WITH ROUTINE HEALING, SUBSEQUENT ENCOUNTER: ICD-10-CM

## 2021-11-09 DIAGNOSIS — N95.9 UNSPECIFIED MENOPAUSAL AND PERIMENOPAUSAL DISORDER: ICD-10-CM

## 2021-11-09 DIAGNOSIS — Z13.29 SCREENING FOR THYROID DISORDER: ICD-10-CM

## 2021-11-09 DIAGNOSIS — Z00.00 ENCOUNTER FOR MEDICARE ANNUAL WELLNESS EXAM: Primary | ICD-10-CM

## 2021-11-09 DIAGNOSIS — Z13.1 SCREENING FOR DIABETES MELLITUS: ICD-10-CM

## 2021-11-09 DIAGNOSIS — Z13.220 LIPID SCREENING: ICD-10-CM

## 2021-11-09 PROCEDURE — G0438 PPPS, INITIAL VISIT: HCPCS | Performed by: NURSE PRACTITIONER

## 2021-11-09 RX ORDER — MULTIVIT-MIN/IRON/FOLIC ACID/K 18-600-40
CAPSULE ORAL DAILY
COMMUNITY
Start: 2021-06-19

## 2021-11-09 RX ORDER — POLYPODIUM LEUCOTOMOS EXTRACT 240 MG
CAPSULE ORAL
COMMUNITY
Start: 2021-05-01

## 2021-11-09 SDOH — HEALTH STABILITY: PHYSICAL HEALTH: ON AVERAGE, HOW MANY MINUTES DO YOU ENGAGE IN EXERCISE AT THIS LEVEL?: 40 MIN

## 2021-11-09 SDOH — ECONOMIC STABILITY: TRANSPORTATION INSECURITY
IN THE PAST 12 MONTHS, HAS LACK OF TRANSPORTATION KEPT YOU FROM MEETINGS, WORK, OR FROM GETTING THINGS NEEDED FOR DAILY LIVING?: NO

## 2021-11-09 SDOH — ECONOMIC STABILITY: INCOME INSECURITY: HOW HARD IS IT FOR YOU TO PAY FOR THE VERY BASICS LIKE FOOD, HOUSING, MEDICAL CARE, AND HEATING?: NOT HARD AT ALL

## 2021-11-09 SDOH — ECONOMIC STABILITY: FOOD INSECURITY: WITHIN THE PAST 12 MONTHS, YOU WORRIED THAT YOUR FOOD WOULD RUN OUT BEFORE YOU GOT MONEY TO BUY MORE.: NEVER TRUE

## 2021-11-09 SDOH — HEALTH STABILITY: PHYSICAL HEALTH: ON AVERAGE, HOW MANY DAYS PER WEEK DO YOU ENGAGE IN MODERATE TO STRENUOUS EXERCISE (LIKE A BRISK WALK)?: 4 DAYS

## 2021-11-09 SDOH — ECONOMIC STABILITY: INCOME INSECURITY: IN THE LAST 12 MONTHS, WAS THERE A TIME WHEN YOU WERE NOT ABLE TO PAY THE MORTGAGE OR RENT ON TIME?: NO

## 2021-11-09 SDOH — ECONOMIC STABILITY: TRANSPORTATION INSECURITY
IN THE PAST 12 MONTHS, HAS THE LACK OF TRANSPORTATION KEPT YOU FROM MEDICAL APPOINTMENTS OR FROM GETTING MEDICATIONS?: NO

## 2021-11-09 SDOH — ECONOMIC STABILITY: FOOD INSECURITY: WITHIN THE PAST 12 MONTHS, THE FOOD YOU BOUGHT JUST DIDN'T LAST AND YOU DIDN'T HAVE MONEY TO GET MORE.: NEVER TRUE

## 2021-11-09 ASSESSMENT — LIFESTYLE VARIABLES
HOW MANY STANDARD DRINKS CONTAINING ALCOHOL DO YOU HAVE ON A TYPICAL DAY: 1 OR 2
HOW OFTEN DO YOU HAVE SIX OR MORE DRINKS ON ONE OCCASION: NEVER
HOW OFTEN DO YOU HAVE A DRINK CONTAINING ALCOHOL: 2-3 TIMES A WEEK

## 2021-11-09 ASSESSMENT — SOCIAL DETERMINANTS OF HEALTH (SDOH)
IN A TYPICAL WEEK, HOW MANY TIMES DO YOU TALK ON THE PHONE WITH FAMILY, FRIENDS, OR NEIGHBORS?: MORE THAN THREE TIMES A WEEK
HOW OFTEN DO YOU GET TOGETHER WITH FRIENDS OR RELATIVES?: ONCE A WEEK
HOW OFTEN DO YOU ATTEND CHURCH OR RELIGIOUS SERVICES?: NEVER
DO YOU BELONG TO ANY CLUBS OR ORGANIZATIONS SUCH AS CHURCH GROUPS UNIONS, FRATERNAL OR ATHLETIC GROUPS, OR SCHOOL GROUPS?: NO

## 2021-11-09 ASSESSMENT — MIFFLIN-ST. JEOR: SCORE: 1508.57

## 2021-11-09 ASSESSMENT — ACTIVITIES OF DAILY LIVING (ADL): CURRENT_FUNCTION: NO ASSISTANCE NEEDED

## 2021-11-09 NOTE — PATIENT INSTRUCTIONS
Patient Education   Personalized Prevention Plan  You are due for the preventive services outlined below.  Your care team is available to assist you in scheduling these services.  If you have already completed any of these items, please share that information with your care team to update in your medical record.  Health Maintenance Due   Topic Date Due     ANNUAL REVIEW OF HM ORDERS  Never done     Zoster (Shingles) Vaccine (1 of 2) Never done     COVID-19 Vaccine (2 - Booster for [place] series) 05/05/2021     Flu Vaccine (1) 09/01/2021     Mammogram  11/01/2021

## 2021-11-09 NOTE — PROGRESS NOTES
"SUBJECTIVE:   Karis Youssef is a 71 year old female who presents for Preventive Visit.      Patient has been advised of split billing requirements and indicates understanding: Yes   Patient would still like to discuss the following concern(s):  1. Right ear fullness, horse voice- took sudafed, has helped some. Most likely cause for elevated blood pressure.     Hx of spinal compression fracture without injury- dx osteoporosis.     Are you in the first 12 months of your Medicare coverage?  No    Healthy Habits:     In general, how would you rate your overall health?  Good    Frequency of exercise:  2-3 days/week    Duration of exercise:  30-45 minutes    Do you usually eat at least 4 servings of fruit and vegetables a day, include whole grains    & fiber and avoid regularly eating high fat or \"junk\" foods?  Yes    Taking medications regularly:  Yes    Medication side effects:  None    Ability to successfully perform activities of daily living:  No assistance needed    Home Safety:  Lack of grab bars in the bathroom    Hearing Impairment:  No hearing concerns    In the past 6 months, have you been bothered by leaking of urine?  No    In general, how would you rate your overall mental or emotional health?  Good      PHQ-2 Total Score: 0    Additional concerns today:  No    Do you feel safe in your environment? Yes    Have you ever done Advance Care Planning? (For example, a Health Directive, POLST, or a discussion with a medical provider or your loved ones about your wishes): No, advance care planning information given to patient to review.  Patient declined advance care planning discussion at this time.       Fall risk  Fallen 2 or more times in the past year?: No  Any fall with injury in the past year?: No    Cognitive Screening   1) Repeat 3 items (Leader, Season, Table)    2) Clock draw: NORMAL  3) 3 item recall: Recalls 3 objects  Results: 3 items recalled: COGNITIVE IMPAIRMENT LESS LIKELY    Mini-CogTM Copyright S " Cm. Licensed by the author for use in [facility]; reprinted with permission ([place]@[facility]). All rights reserved.      Do you have sleep apnea, excessive snoring or daytime drowsiness?: no    Reviewed and updated as needed this visit by clinical staff  Tobacco  Allergies  Meds  Problems  Med Hx  Surg Hx  Fam Hx  Soc Hx         Reviewed and updated as needed this visit by Provider  Tobacco  Allergies  Meds  Problems  Med Hx  Surg Hx  Fam Hx        Social History     Tobacco Use     Smoking status: Former Smoker     Packs/day: 0.00     Years: 0.00     Pack years: 0.00     Quit date: 1984     Years since quittin.8     Smokeless tobacco: Never Used     Tobacco comment: nonsmoking hosuehold   Substance Use Topics     Alcohol use: Yes     Comment: 1 glass of wine one/two times a week         Alcohol Use 2021   Prescreen: >3 drinks/day or >7 drinks/week? No   Prescreen: >3 drinks/day or >7 drinks/week? -           Current providers sharing in care for this patient include:   Patient Care Team:  Josee Carpenter MD as PCP - General (Family Practice)  Josee Carpenter MD as Assigned PCP  Adelfo Berrios MD as Assigned Surgical Provider    The following health maintenance items are reviewed in Epic and correct as of today:  Health Maintenance Due   Topic Date Due     ZOSTER IMMUNIZATION (1 of 2) Never done     COVID-19 Vaccine (2 - Booster for [place] series) 2021     INFLUENZA VACCINE (1) 2021     MAMMO SCREENING  2021     Lab work is in process  Labs reviewed in EPIC  BP Readings from Last 3 Encounters:   21 130/84   21 131/84   21 (!) 146/85    Wt Readings from Last 3 Encounters:   21 99.5 kg (219 lb 6.4 oz)   21 99.8 kg (220 lb)   21 99.5 kg (219 lb 6.4 oz)                  Patient Active Problem List   Diagnosis     Thigh pain     Osteopenia     Sjogren's syndrome (H)     CARDIOVASCULAR SCREENING; LDL GOAL  LESS THAN 160     Advanced directives, counseling/discussion     Palpitations     Acute conjunctivitis     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     Choroidal nevus, left     Acute pain of left shoulder     Displaced fracture of neck of right radius     Compression fracture of T9 vertebra, initial encounter (H)     Age-related osteoporosis with current pathological fracture with routine healing     Past Surgical History:   Procedure Laterality Date     BIOPSY      right breast lumpectomy     BREAST LUMPECTOMY, RT/LT      right breast, benign     CATARACT IOL, RT/LT      bilateral     ORTHOPEDIC SURGERY  2018    right radius        Social History     Tobacco Use     Smoking status: Former Smoker     Packs/day: 0.00     Years: 0.00     Pack years: 0.00     Quit date: 1984     Years since quittin.8     Smokeless tobacco: Never Used     Tobacco comment: nonsmoking hosuehold   Substance Use Topics     Alcohol use: Yes     Comment: 1 glass of wine one/two times a week     Family History   Problem Relation Age of Onset     C.A.D. Mother      Osteoporosis Mother      Hypertension Mother      Heart Disease Mother         CAD, CHF     Coronary Artery Disease Mother      Hyperlipidemia Mother      Diabetes Father      Hypertension Father      Cancer Brother         liver     Diabetes Brother      Diabetes Sister      Breast Cancer Sister 50     Hypertension Sister      Cancer Sister 72        non-hodgkins lymphoma     Cerebrovascular Disease Maternal Grandmother      Diabetes Paternal Grandfather      Diabetes Daughter         type 2     Connective Tissue Disorder Other         2 cousins and niece with MS; aunt with lupus     Rheumatoid Arthritis Cousin      Diabetes Niece      Hyperlipidemia Sister      Other Cancer Brother         Liver     Diabetes Brother      Other Cancer Sister         non-hodgkins lymphoma     Thyroid Disease Sister      Diabetes Sister      Hypertension Sister      Osteoporosis Sister       Cerebrovascular Disease Sister          Current Outpatient Medications   Medication Sig Dispense Refill     Calcium Carbonate-Vitamin D (CALCIUM 600 + D OR) Take 1,200 mg by mouth daily        carboxymethylcellulose (CELLUVISC/REFRESH LIQUIGEL) 1 % ophthalmic solution Place 1 drop into both eyes 3 times daily       cetirizine (ZYRTEC) 10 MG tablet Take 10 mg by mouth daily       diclofenac (VOLTAREN) 1 % topical gel Place onto the skin 4 times daily 100 g 6     MAGNESIUM PO Take 250 mg by mouth daily        Polypodium Leucotomos (HELIOCARE) 240 MG CAPS        SUDAFED 30 MG OR TABS 1 TABLET EVERY 4 TO 6 HOURS AS NEEDED       VITAMIN C 500 MG OR TABS 1 TABLET  DAILY       Vitamin D, Cholecalciferol, 25 MCG (1000 UT) TABS        VITAMIN E 400 UNIT PO CAPS 1 CAPSULE DAILY       Allergies   Allergen Reactions     Erythromycin Nausea     Tongue turned black.     Nsaids GI Disturbance     Gastritis        Recent Labs   Lab Test 06/03/21  1328 12/21/20  0843 10/08/19  1003 02/19/19  0951 08/14/17  0953   LDL  --  140* 153*  --  116*   HDL  --  71 66  --  68   TRIG  --  84 66  --  66   ALT  --   --   --  28  --    CR 0.75  --   --  0.64  --    GFRESTIMATED 81  --   --  >90  --    GFRESTBLACK >90  --   --  >90  --    POTASSIUM  --   --   --  4.4  --    TSH  --   --   --   --  2.62      Mammogram Screening: Mammogram Screening: Recommended mammography every 1-2 years with patient discussion and risk factor consideration  Last 3 Pap and HPV Results:   PAP / HPV 12/23/2013 8/16/2010   PAP (Historical) NIL NIL       FHS-7:   Breast CA Risk Assessment (FHS-7) 11/9/2021   Did any of your first-degree relatives have breast or ovarian cancer? Yes   Did any of your relatives have bilateral breast cancer? No       Mammogram Screening: Recommended mammography every 1-2 years with patient discussion and risk factor consideration  Pertinent mammograms are reviewed under the imaging tab.    Review of Systems  CONSTITUTIONAL: NEGATIVE  "for fever, chills, change in weight  INTEGUMENTARY/SKIN: NEGATIVE for worrisome rashes, moles or lesions  EYES: NEGATIVE for vision changes or irritation  ENT/MOUTH: NEGATIVE for ear, mouth and throat problems  RESP: NEGATIVE for significant cough or SOB  BREAST: NEGATIVE for masses, tenderness or discharge  CV: NEGATIVE for chest pain, palpitations or peripheral edema  GI: NEGATIVE for nausea, abdominal pain, heartburn, or change in bowel habits  : NEGATIVE for frequency, dysuria, or hematuria  MUSCULOSKELETAL: NEGATIVE for significant arthralgias or myalgia  NEURO: NEGATIVE for weakness, dizziness or paresthesias  ENDOCRINE: NEGATIVE for temperature intolerance, skin/hair changes  HEME: NEGATIVE for bleeding problems  PSYCHIATRIC: NEGATIVE for changes in mood or affect    OBJECTIVE:   /84   Pulse 94   Temp 98.4  F (36.9  C) (Tympanic)   Resp 16   Ht 1.631 m (5' 4.21\")   Wt 99.5 kg (219 lb 6.4 oz)   SpO2 96%   BMI 37.41 kg/m   Estimated body mass index is 37.41 kg/m  as calculated from the following:    Height as of this encounter: 1.631 m (5' 4.21\").    Weight as of this encounter: 99.5 kg (219 lb 6.4 oz).  Physical Exam  GENERAL APPEARANCE: healthy, alert and no distress  EYES: Eyes grossly normal to inspection, PERRL and conjunctivae and sclerae normal  HENT: ear canals and TM's normal, nose and mouth without ulcers or lesions, oropharynx clear and oral mucous membranes moist  NECK: no adenopathy, no asymmetry, masses, or scars and thyroid normal to palpation  RESP: lungs clear to auscultation - no rales, rhonchi or wheezes  BREAST: deferred per guidelines- asymptomatic per pt  CV: regular rate and rhythm, normal S1 S2, no S3 or S4, no murmur, click or rub, no peripheral edema and peripheral pulses strong  ABDOMEN: soft, nontender, no hepatosplenomegaly, no masses and bowel sounds normal   (female): deferred per pt- no concerns.   MS: no musculoskeletal defects are noted and gait is age " "appropriate without ataxia, no CVA tenderness  SKIN: no suspicious lesions or rashes  NEURO: Normal strength and tone, cranial nerves intact, sensory exam grossly normal, mentation intact and speech normal  PSYCH: mentation appears normal and affect normal/bright    Diagnostic Test Results:  Labs reviewed in Epic  See orders- pt would like to return for fasting labs.    ASSESSMENT / PLAN:       ICD-10-CM    1. Encounter for Medicare annual wellness exam  Z00.00    2. High priority for 2019-nCoV vaccine  Z23 CANCELED: COVID-19,PF,MODERNA (18+ Yrs BOOSTER .25mL)   3. Encounter for screening mammogram for breast cancer  Z12.31 *MA Screening Digital Bilateral   4. Screening for thyroid disorder  Z13.29 TSH with free T4 reflex   5. Screening for diabetes mellitus  Z13.1 Glucose   6. Lipid screening  Z13.220 Lipid panel reflex to direct LDL Fasting   7. Age-related osteoporosis with current pathological fracture with routine healing, subsequent encounter  M80.00XD CANCELED: DX Hip/Pelvis/Spine   8. Unspecified menopausal and perimenopausal disorder   N95.9 CANCELED: DX Hip/Pelvis/Spine       Patient has been advised of split billing requirements and indicates understanding: Yes  COUNSELING:  Reviewed preventive health counseling, as reflected in patient instructions    Estimated body mass index is 37.41 kg/m  as calculated from the following:    Height as of this encounter: 1.631 m (5' 4.21\").    Weight as of this encounter: 99.5 kg (219 lb 6.4 oz).    Weight management plan: Discussed healthy diet and exercise guidelines    She reports that she quit smoking about 37 years ago. She smoked 0.00 packs per day for 0.00 years. She has never used smokeless tobacco.      Appropriate preventive services were discussed with this patient, including applicable screening as appropriate for cardiovascular disease, diabetes, osteopenia/osteoporosis, and glaucoma.  As appropriate for age/gender, discussed screening for colorectal " cancer, prostate cancer, breast cancer, and cervical cancer. Checklist reviewing preventive services available has been given to the patient.    Reviewed patients plan of care and provided an AVS. The Basic Care Plan (routine screening as documented in Health Maintenance) for Karis meets the Care Plan requirement. This Care Plan has been established and reviewed with the Patient.    Counseling Resources:  ATP IV Guidelines  Pooled Cohorts Equation Calculator  Breast Cancer Risk Calculator  Breast Cancer: Medication to Reduce Risk  FRAX Risk Assessment  ICSI Preventive Guidelines  Dietary Guidelines for Americans, 2010  USDA's MyPlate  ASA Prophylaxis  Lung CA Screening    ABDOUL Tripathi  [facility] EVELIO    Identified Health Risks:

## 2021-11-24 ENCOUNTER — ANCILLARY PROCEDURE (OUTPATIENT)
Dept: GENERAL RADIOLOGY | Facility: CLINIC | Age: 71
End: 2021-11-24
Attending: FAMILY MEDICINE
Payer: COMMERCIAL

## 2021-11-24 ENCOUNTER — OFFICE VISIT (OUTPATIENT)
Dept: URGENT CARE | Facility: URGENT CARE | Age: 71
End: 2021-11-24
Payer: COMMERCIAL

## 2021-11-24 VITALS
HEART RATE: 85 BPM | SYSTOLIC BLOOD PRESSURE: 153 MMHG | DIASTOLIC BLOOD PRESSURE: 83 MMHG | OXYGEN SATURATION: 95 % | RESPIRATION RATE: 20 BRPM | BODY MASS INDEX: 37.34 KG/M2 | TEMPERATURE: 97.1 F | WEIGHT: 219 LBS

## 2021-11-24 DIAGNOSIS — M25.511 ACUTE PAIN OF RIGHT SHOULDER: ICD-10-CM

## 2021-11-24 DIAGNOSIS — S09.92XA INJURY OF NOSE, INITIAL ENCOUNTER: ICD-10-CM

## 2021-11-24 DIAGNOSIS — S09.92XA INJURY OF NOSE, INITIAL ENCOUNTER: Primary | ICD-10-CM

## 2021-11-24 PROCEDURE — 70160 X-RAY EXAM OF NASAL BONES: CPT | Performed by: RADIOLOGY

## 2021-11-24 PROCEDURE — 73030 X-RAY EXAM OF SHOULDER: CPT | Mod: RT | Performed by: RADIOLOGY

## 2021-11-24 PROCEDURE — 99214 OFFICE O/P EST MOD 30 MIN: CPT | Performed by: FAMILY MEDICINE

## 2021-11-24 NOTE — PATIENT INSTRUCTIONS
Patient Education     Nose Fracture, with X-Ray  A broken bone, or fracture, of the nose may be a minor crack. Or it may be a major break, with the parts of your nose pushed out of place. A fractured nose causes pain, swelling, and nasal stuffiness. You may have bleeding from your nose. By tomorrow, you may have bruising around your eyes.   A minor fracture will heal in 3 to 4 weeks, with no more treatment needed. A major break that changes the shape of your nose may need to be treated by a nose specialist, called an ENT (ear, nose, and throat) doctor.The ENT doctor will straighten the bones in your nose. This is called a reduction. Some fractures may need a reduction as soon as possible, such as when bleeding from the nose won't stop. Otherwise, it is best to wait a few days until the swelling has gone down. The doctor will then be able to easily see when your nose is back in the right position.     Home care    Use an ice pack on your nose for no more than 15 to 20 minutes at a time. Do this every 1 to 2 hours for the first 24 to 48 hours. Then use the ice as needed to ease pain and swelling. To make an ice pack, put ice cubes in a plastic bag that seals at the top. Wrap the bag in a clean, thin towel or cloth. Never put ice or an ice pack directly on the skin.    Tell your provider if you are taking aspirin or blood-thinning medicine. These medicines make it more likely that your nose will bleed. Your provider may need to change your dose.    You may use over-the-counter pain medicine to control pain, unless another medicine was prescribed. If you have chronic liver or kidney disease or history of gastrointestinal ulcers, talk with your provider before using this medicine.    Don t drink alcohol or hot liquids for the next 2 days. Alcohol and hot liquids can dilate blood vessels in your nose. This can cause bleeding.    Don t blow your nose for the first 2 days. Then, do so gently so you don't cause  bleeding.    Don t play contact sports in the next 6 weeks unless you can protect your nose from getting injured again. You can wear a special custom-fitted plastic face mask to protect your nose.    Special note on concussions  If you had any symptoms of a concussion today, don t return to sports or any activity that could result in another head injury.   These are symptoms of a concussion:    Nausea    Vomiting    Dizziness    Confusion    Headache    Memory loss    Loss of consciousness  Wait until all of your symptoms are gone and your provider says it s OK to resume your activity. Having a second head injury before you fully recover from the first one can lead to serious brain injury.   Follow-up care  Follow up with your healthcare provider, or as advised. If your nose looks crooked after the swelling goes down, call the ENT doctor for an appointment within the next 10 days. Also make an appointment if it s still hard to breathe through 1 or both sides of your nose. If you have trouble getting an ENT appointment, call your regular provider.   If the bones are out of place, a reduction should be done 6 to 10 days after the injury. In children, the reduction should be done 3 to 7 days after the injury. After that time, the bones are more difficult to move back into place.   If you had X-rays taken, you will be told of any new findings that may affect your care.   When to seek medical advice  Call your healthcare provider right away if any of these occur:    Bleeding from your nose even after you have pinched your nostrils together for 15 minutes without stopping    Swelling, pain, or redness on your face that gets worse    Fever of 100.4 F (38 C) or higher, or chills, as directed by your healthcare provider    Can't breathe from both sides of your nose after swelling goes down    Sinus pain  Call 911  Call 911 if you have:     Repeated vomiting    Severe headache or dizziness    Headache or dizziness that gets  worse    Abnormal drowsiness, or unable to wake up as usual    Confusion or change in behavior or speech    Convulsion, or seizure  Angel last reviewed this educational content on 4/1/2018 2000-2021 [employer]. All rights reserved. This information is not intended as a substitute for professional medical care. Always follow your healthcare professional's instructions.

## 2021-11-24 NOTE — PROGRESS NOTES
Chief complaint: fell    1.5 hours ago was leaving home dept  Not sure left foot didn't move as she tried to take a step  Patient was holding a wreath and patient faceplanted  Nose hit the ground  Couple of people helped her  head injury:Yes - but per patient just in the face   loss of consciousness:  No  syncope or presyncope: No  chest pain or palpitation: No  mechanical fall:  Yes  using assistive devices:  No  blood thinners: No     Right arm hurting a little bit     ROS:  as per hpi  denies headache  denies any nausea or vomiting  denies any amnesia, confusion or concussion symptoms  denies any blurring of vision  denies any otorrhea or rhinorhea  denies any neck pain  denies any back pain.  denies any chest pain or shortness of breath  denies any joint pain except noted above.  denies any bowel or bladder incontinence or motor or sensory deficits.  denies any abdominal pain, nausea or vomiting or flank pain  denies any hematuria      Allergies   Allergen Reactions     Erythromycin Nausea     Tongue turned black.     Nsaids GI Disturbance     Gastritis          Past Medical History:   Diagnosis Date     Abnormal Pap smear 1995    had colpo 1995-hyperplasia, nl since     Arthritis      Chronic obstructive pulmonary disease (H)      Chronic obstructive pulmonary disease, unspecified COPD type (H)      Disturbance of skin sensation      Osteopenia      Osteopenia      Seasonal allergies      Sjogren's syndrome (H)        Calcium Carbonate-Vitamin D (CALCIUM 600 + D OR), Take 1,200 mg by mouth daily   carboxymethylcellulose (CELLUVISC/REFRESH LIQUIGEL) 1 % ophthalmic solution, Place 1 drop into both eyes 3 times daily  cetirizine (ZYRTEC) 10 MG tablet, Take 10 mg by mouth daily  diclofenac (VOLTAREN) 1 % topical gel, Place onto the skin 4 times daily  MAGNESIUM PO, Take 250 mg by mouth daily   Polypodium Leucotomos (HELIOCARE) 240 MG CAPS,   SUDAFED 30 MG OR TABS, 1 TABLET EVERY 4 TO 6 HOURS AS NEEDED  VITAMIN C  500 MG OR TABS, 1 TABLET  DAILY  Vitamin D, Cholecalciferol, 25 MCG (1000 UT) TABS,   VITAMIN E 400 UNIT PO CAPS, 1 CAPSULE DAILY    No current facility-administered medications on file prior to visit.      Social History     Tobacco Use     Smoking status: Former Smoker     Packs/day: 0.00     Years: 0.00     Pack years: 0.00     Quit date: 1984     Years since quittin.9     Smokeless tobacco: Never Used     Tobacco comment: nonsmoking hosuehold   Vaping Use     Vaping Use: Never used   Substance Use Topics     Alcohol use: Yes     Comment: 1 glass of wine one/two times a week     Drug use: No       ROS:  review of systems negative except for noted above.       OBJECTIVE:  BP (!) 153/83 (BP Location: Left arm, Patient Position: Sitting, Cuff Size: Adult Large)   Pulse 85   Temp 97.1  F (36.2  C) (Tympanic)   Resp 20   Wt 99.3 kg (219 lb)   SpO2 95%   BMI 37.34 kg/m     General:   awake, alert, and cooperative.  NAD.   Head: Normocephalic, atraumatic except for nose   Eyes: Conjunctiva clear,   ENT: no periorbital ecchymosis, no otorrhea or rhinorrhea, negative Romero's sign, no raccoon eyes, no hematympanum  BRUISING OVER THE NASAL BRIDGE AND ABRASION.   NO SEPTAL HEMATOMA   Heart: Regular rate and rhythm. No murmur.  Lungs: Chest is clear; no wheezes or rales.   Abdomen: soft non-tender. No bruising noted.   Neuro: Alert and oriented - normal speech. Cranial nerves intact, MMT 5/5 all extremities, sensory intact, normal gait and normal cerebellar function  MS: Using extremities freely , No cervical, thoracic, or lumbar spine tenderness  Right shoulder   No erythema warmth or swelling  AC joint tenderness: none  Bicipital groove tenderness: none  Speed's test: negative   Cross arm test: negative   Active range of motion: intact  Passive range of motion: intact   Motor intact sensory intact.   PSYCH:  Normal affect, normal speech  SKIN: no obvious rashes    Diagnostic Test Results:  No results found  for this or any previous visit (from the past 24 hour(s)).    ASSESSMENT:    ICD-10-CM    1. Injury of nose, initial encounter  S09.92XA XR Nasal Bones 3 Views     Otolaryngology Referral   2. Acute pain of right shoulder  M25.511 XR Shoulder Right G/E 3 Views         PLAN:   Nasal bone fracture seen on xray   Supportive treatment    See AVS  Alarm signs or symptoms discussed, if present recommend go to ER   Referred to ENT for follow up   Per patient only facial injury, not head injury   Signs and symptoms of concussion discussed. If with worsening symptoms or concerns proceed to ER  Aware to go to the ER if with worsening symptoms of headache, nausea or vomiting, chest pain or shortness of breath, bowel/bladder incontinence, motor or sensory deficits, bloody urine, changes in behavior, confusion, difficulty walking or seizure    Shoulder xrays negative to my review  Possible sprain  Activity modification  Symptoms are mild per patient   Follow up:  With primary care provider in 3 days, sooner if worse   Recheck bp at primary care provider .   Advised about symptoms which might herald more serious problems.        Giselle Perez MD

## 2021-11-29 ENCOUNTER — E-VISIT (OUTPATIENT)
Dept: URGENT CARE | Facility: URGENT CARE | Age: 71
End: 2021-11-29
Payer: COMMERCIAL

## 2021-11-29 DIAGNOSIS — Z20.822 CLOSE EXPOSURE TO 2019 NOVEL CORONAVIRUS: Primary | ICD-10-CM

## 2021-11-29 PROCEDURE — 99421 OL DIG E/M SVC 5-10 MIN: CPT | Performed by: PHYSICIAN ASSISTANT

## 2021-11-29 NOTE — PATIENT INSTRUCTIONS
"  Dear Karis Youssef,    Based on your exposure to COVID-19 (coronavirus), we would like to test you for this virus. I have placed an order for this test.The best time for testing is 5-7 days after the exposure.    How to schedule:  Go to your [facility] home page and scroll down to the section that says  You have an appointment that needs to be scheduled  and click the large green button that says  Schedule Now  and follow the steps to find the next available opening.     If you are unable to complete these [facility] scheduling steps, please call [phone] to schedule your testing.     Return to work/school/ guidance:   For people with high risk exposures outside the home    Please let your workplace manager and staffing office know when your quarantine ends.     We can not give you an exact date as it depends on the information below. You can calculate this on your own or work with your manager/staffing office to calculate this. (For example if you were exposed on 10/4, you would have to quarantine for 14 full days. That would be through 10/18. You could return on 10/19.)    Quarantine Guidelines:  Patients (\"contacts\") who have been in close prolonged contact of an infected person(s) (within six feet for at least 15 minutes within a 24 hour period), and remain asymptomatic should enter quarantine based on the following options:    14-day quarantine period (this remains the CDC recommendation for the greatest protection against spread of COVID-19) OR    Minimum 7-day quarantine with negative RT-PCR test collected on day 5 or later OR    10-day quarantine with no test  Quarantine Guideline exceptions are as follows:    People who have been fully vaccinated do not need to quarantine if the exposure was at least 2 weeks after the last vaccination. This includes vaccinated health care workers.    Not fully vaccinated and unvaccinated Individuals who work in health care, congregate care, or congregate living " should be off work for 14 days from their last date of exposure. Community activities for this group can be resumed based on options above. Fully vaccinated individuals in this group do not need to quarantine from work after exposure.    Not fully vaccinated and unvaccinated people whose high-risk exposure was a household member should always quarantine for 14 days from their last date of exposure. Fully vaccinated people in this category do not need to quarantine.    Not fully vaccinated or unvaccinated residents of congregate care and congregate living settings should always quarantine for 14 days from their last date of exposure. Fully vaccinated residents do not need to quarantine.  Note: If you have ongoing exposure to the covid positive person, this quarantine period may be more than 14 days. (For example, if you are continued to be exposed to your child who tested positive and cannot isolate from them, then the quarantine of 7-14 days can't start until your child is no longer contagious. This is typically 10 days from onset of the child's symptoms. So the total duration may be 17-24 days in this case.)    You should continue symptom monitoring until day 14 post-exposure. If you develop signs or symptoms of COVID-19, isolate and get tested (even if you have been tested already).    How to quarantine:   Stay home and away from others. Don't go to school or anywhere else. Generally quarantine means staying home from work but there are some exceptions to this. Please contact your workplace.  No hugging, kissing or shaking hands.  Don't let anyone visit.  Cover your mouth and nose with a mask, tissue or washcloth to avoid spreading germs.  Wash your hands and face often. Use soap and water.    What are the symptoms of COVID-19?  The most common symptoms are cough, fever and trouble breathing. Less common symptoms include headache, body aches, fatigue (feeling very tired), chills, sore throat, stuffy or runny nose,  diarrhea (loose poop), loss of taste or smell, belly pain, and nausea or vomiting (feeling sick to your stomach or throwing up).  After 14 days, if you have still don't have symptoms, you likely don't have this virus.  If you develop symptoms, follow these guidelines.  If you're normally healthy: Please start another eVisit.  If you have a serious health problem (like cancer, heart failure, an organ transplant or kidney disease): Call your specialty clinic. Let them know that you might have COVID-19.    Where can I get more information?  [facility] - About COVID-19: [email or web address]  CDC - What to Do If You're Sick: www.cdc.gov/coronavirus/2019-ncov/about/steps-when-sick.html  CDC - Ending Home Isolation: www.cdc.gov/coronavirus/2019-ncov/hcp/disposition-in-home-patients.html  CDC - Caring for Someone: www.cdc.gov/coronavirus/2019-ncov/if-you-are-sick/care-for-someone.html  [facility] clinical trials (COVID-19 research studies): clinicalaffairs.[facility]-clinical-trials  Below are the COVID-19 hotlines at the [facility] ([place]). Interpreters are available.  For health questions: Call 486-959-6809 or 1-264.242.4421 (7 a.m. to 7 p.m.)  For questions about schools and childcare: Call 488-741-8957 or 1-642.415.7683 (7 a.m. to 7 p.m.)        November 29, 2021  RE:  Karis Youssef                                                                                                                   [address] [address]      To whom it may concern:    I evaluated Karis Youssef on November 29, 2021. Karis Youssef should be excused from work/school.    They should let their workplace manager and staffing office know when their quarantine ends.    We can not give an exact date as it depends on the information below. They can calculate this on their own or work with their manager/staffing office to calculate this. (For example if they were exposed  "on 10/04, they would have to quarantine for 14 full days. That would be through 10/18. They could return on 10/19.)    Quarantine Guidelines:    Patients (\"contacts\") who have been in close prolonged contact of an infected person(s) (within six feet for at least 15 minutes within a 24 hour period) and remain asymptomatic should enter quarantine based on the following options:      14-day quarantine period (this remains the CDC recommendation for the greatest protection against spread of COVID-19) OR    Minimum 7-day quarantine with negative RT-PCR test collected on day 5 or later OR    10-day quarantine with no test   Quarantine Guideline exceptions are as follows:    People who have been fully vaccinated do not need to quarantine if the exposure was at least 2 weeks after the last vaccination. This includes vaccinated health care workers.    Not fully vaccinated and unvaccinated Individuals who work in health care, congregate care, or congregate living should be off work for 14 days from their last date of exposure. Community activities for this group can be resumed based on options above. Fully vaccinated individuals in this group do not need to quarantine from work after exposure.    Not fully vaccinated and unvaccinated people whose high-risk exposure was a household member should always quarantine for 14 days from their last date of exposure. Fully vaccinated people in this category do not need to quarantine.    Not fully vaccinated or unvaccinated residents of congregate care and congregate living settings should always quarantine for 14 days from their last date of exposure. Fully vaccinated residents do not need to quarantine.    Note: If there is ongoing exposure to the covid positive person, this quarantine period may be longer than 14 days. (For example, if they are continually exposed to their child, who tested positive and cannot isolate from them, then the quarantine of 7-14 days can't start until " their child is no longer contagious. This is typically 10 days from onset to the child's symptoms. So the total duration may be 17-24 days in this case.)    Karis Youssef should continue symptom monitoring until day 14 post-exposure. If they develop signs or symptoms of COVID-19, they should isolate and get tested (even if they have been tested already).    Sincerely,  Carey Griggs PA-C

## 2021-12-03 ENCOUNTER — LAB (OUTPATIENT)
Dept: LAB | Facility: CLINIC | Age: 71
End: 2021-12-03
Attending: PHYSICIAN ASSISTANT
Payer: COMMERCIAL

## 2021-12-03 DIAGNOSIS — Z20.822 CLOSE EXPOSURE TO 2019 NOVEL CORONAVIRUS: ICD-10-CM

## 2021-12-03 PROCEDURE — U0003 INFECTIOUS AGENT DETECTION BY NUCLEIC ACID (DNA OR RNA); SEVERE ACUTE RESPIRATORY SYNDROME CORONAVIRUS 2 (SARS-COV-2) (CORONAVIRUS DISEASE [COVID-19]), AMPLIFIED PROBE TECHNIQUE, MAKING USE OF HIGH THROUGHPUT TECHNOLOGIES AS DESCRIBED BY CMS-2020-01-R: HCPCS

## 2021-12-03 PROCEDURE — U0005 INFEC AGEN DETEC AMPLI PROBE: HCPCS

## 2021-12-04 LAB — SARS-COV-2 RNA RESP QL NAA+PROBE: NEGATIVE

## 2021-12-08 ENCOUNTER — E-VISIT (OUTPATIENT)
Dept: URGENT CARE | Facility: CLINIC | Age: 71
End: 2021-12-08
Payer: COMMERCIAL

## 2021-12-08 DIAGNOSIS — Z20.822 CLOSE EXPOSURE TO 2019 NOVEL CORONAVIRUS: Primary | ICD-10-CM

## 2021-12-08 PROCEDURE — 99421 OL DIG E/M SVC 5-10 MIN: CPT | Performed by: NURSE PRACTITIONER

## 2021-12-08 NOTE — PATIENT INSTRUCTIONS
"  Dear Karis Youssef,    Based on your exposure to COVID-19 (coronavirus), we would like to test you for this virus. I have placed an order for this test.The best time for testing is 5-7 days after the exposure.    How to schedule:  Go to your [facility] home page and scroll down to the section that says  You have an appointment that needs to be scheduled  and click the large green button that says  Schedule Now  and follow the steps to find the next available opening.     If you are unable to complete these [facility] scheduling steps, please call [phone] to schedule your testing.     Return to work/school/ guidance:   For people with high risk exposures outside the home    Please let your workplace manager and staffing office know when your quarantine ends.     We can not give you an exact date as it depends on the information below. You can calculate this on your own or work with your manager/staffing office to calculate this. (For example if you were exposed on 10/4, you would have to quarantine for 14 full days. That would be through 10/18. You could return on 10/19.)    Quarantine Guidelines:  Patients (\"contacts\") who have been in close prolonged contact of an infected person(s) (within six feet for at least 15 minutes within a 24 hour period), and remain asymptomatic should enter quarantine based on the following options:    14-day quarantine period (this remains the CDC recommendation for the greatest protection against spread of COVID-19) OR    Minimum 7-day quarantine with negative RT-PCR test collected on day 5 or later OR    10-day quarantine with no test  Quarantine Guideline exceptions are as follows:    People who have been fully vaccinated do not need to quarantine if the exposure was at least 2 weeks after the last vaccination. This includes vaccinated health care workers.    Not fully vaccinated and unvaccinated Individuals who work in health care, congregate care, or congregate living " should be off work for 14 days from their last date of exposure. Community activities for this group can be resumed based on options above. Fully vaccinated individuals in this group do not need to quarantine from work after exposure.    Not fully vaccinated and unvaccinated people whose high-risk exposure was a household member should always quarantine for 14 days from their last date of exposure. Fully vaccinated people in this category do not need to quarantine.    Not fully vaccinated or unvaccinated residents of congregate care and congregate living settings should always quarantine for 14 days from their last date of exposure. Fully vaccinated residents do not need to quarantine.  Note: If you have ongoing exposure to the covid positive person, this quarantine period may be more than 14 days. (For example, if you are continued to be exposed to your child who tested positive and cannot isolate from them, then the quarantine of 7-14 days can't start until your child is no longer contagious. This is typically 10 days from onset of the child's symptoms. So the total duration may be 17-24 days in this case.)    You should continue symptom monitoring until day 14 post-exposure. If you develop signs or symptoms of COVID-19, isolate and get tested (even if you have been tested already).    How to quarantine:   Stay home and away from others. Don't go to school or anywhere else. Generally quarantine means staying home from work but there are some exceptions to this. Please contact your workplace.  No hugging, kissing or shaking hands.  Don't let anyone visit.  Cover your mouth and nose with a mask, tissue or washcloth to avoid spreading germs.  Wash your hands and face often. Use soap and water.    What are the symptoms of COVID-19?  The most common symptoms are cough, fever and trouble breathing. Less common symptoms include headache, body aches, fatigue (feeling very tired), chills, sore throat, stuffy or runny nose,  diarrhea (loose poop), loss of taste or smell, belly pain, and nausea or vomiting (feeling sick to your stomach or throwing up).  After 14 days, if you have still don't have symptoms, you likely don't have this virus.  If you develop symptoms, follow these guidelines.  If you're normally healthy: Please start another eVisit.  If you have a serious health problem (like cancer, heart failure, an organ transplant or kidney disease): Call your specialty clinic. Let them know that you might have COVID-19.    Where can I get more information?  [facility] - About COVID-19: [email or web address]  CDC - What to Do If You're Sick: www.cdc.gov/coronavirus/2019-ncov/about/steps-when-sick.html  CDC - Ending Home Isolation: www.cdc.gov/coronavirus/2019-ncov/hcp/disposition-in-home-patients.html  CDC - Caring for Someone: www.cdc.gov/coronavirus/2019-ncov/if-you-are-sick/care-for-someone.html  [facility] clinical trials (COVID-19 research studies): clinicalaffairs.[facility]-clinical-trials  Below are the COVID-19 hotlines at the [place] [facility] ([facility]). Interpreters are available.  For health questions: Call 599-027-7852 or 1-630.934.5609 (7 a.m. to 7 p.m.)  For questions about schools and childcare: Call 086-258-2580 or 1-784.231.5239 (7 a.m. to 7 p.m.)

## 2021-12-10 ENCOUNTER — LAB (OUTPATIENT)
Dept: LAB | Facility: CLINIC | Age: 71
End: 2021-12-10
Attending: NURSE PRACTITIONER
Payer: COMMERCIAL

## 2021-12-10 DIAGNOSIS — Z20.822 CLOSE EXPOSURE TO 2019 NOVEL CORONAVIRUS: ICD-10-CM

## 2021-12-10 PROCEDURE — U0003 INFECTIOUS AGENT DETECTION BY NUCLEIC ACID (DNA OR RNA); SEVERE ACUTE RESPIRATORY SYNDROME CORONAVIRUS 2 (SARS-COV-2) (CORONAVIRUS DISEASE [COVID-19]), AMPLIFIED PROBE TECHNIQUE, MAKING USE OF HIGH THROUGHPUT TECHNOLOGIES AS DESCRIBED BY CMS-2020-01-R: HCPCS

## 2021-12-10 PROCEDURE — U0005 INFEC AGEN DETEC AMPLI PROBE: HCPCS

## 2021-12-11 LAB — SARS-COV-2 RNA RESP QL NAA+PROBE: NEGATIVE

## 2021-12-13 ENCOUNTER — OFFICE VISIT (OUTPATIENT)
Dept: OPHTHALMOLOGY | Facility: CLINIC | Age: 71
End: 2021-12-13
Payer: COMMERCIAL

## 2021-12-13 DIAGNOSIS — Z96.1 PSEUDOPHAKIA OF BOTH EYES: ICD-10-CM

## 2021-12-13 DIAGNOSIS — H52.13 MYOPIA OF BOTH EYES WITH REGULAR ASTIGMATISM AND PRESBYOPIA: ICD-10-CM

## 2021-12-13 DIAGNOSIS — H52.4 MYOPIA OF BOTH EYES WITH REGULAR ASTIGMATISM AND PRESBYOPIA: ICD-10-CM

## 2021-12-13 DIAGNOSIS — H52.223 MYOPIA OF BOTH EYES WITH REGULAR ASTIGMATISM AND PRESBYOPIA: ICD-10-CM

## 2021-12-13 DIAGNOSIS — D31.32 CHOROIDAL NEVUS, LEFT: ICD-10-CM

## 2021-12-13 DIAGNOSIS — Z01.00 EXAMINATION OF EYES AND VISION: Primary | ICD-10-CM

## 2021-12-13 PROCEDURE — 92004 COMPRE OPH EXAM NEW PT 1/>: CPT | Performed by: STUDENT IN AN ORGANIZED HEALTH CARE EDUCATION/TRAINING PROGRAM

## 2021-12-13 PROCEDURE — 92015 DETERMINE REFRACTIVE STATE: CPT | Performed by: STUDENT IN AN ORGANIZED HEALTH CARE EDUCATION/TRAINING PROGRAM

## 2021-12-13 ASSESSMENT — EXTERNAL EXAM - RIGHT EYE: OD_EXAM: NORMAL

## 2021-12-13 ASSESSMENT — REFRACTION_MANIFEST
OS_ADD: +1.50
OD_CYLINDER: +1.25
OS_AXIS: 170
OS_SPHERE: -0.50
OD_AXIS: 175
OD_SPHERE: -0.25
OD_ADD: +1.50
OS_CYLINDER: +1.00

## 2021-12-13 ASSESSMENT — CONF VISUAL FIELD
OS_NORMAL: 1
OD_NORMAL: 1

## 2021-12-13 ASSESSMENT — VISUAL ACUITY
OD_SC+: +3
METHOD: SNELLEN - LINEAR
OD_SC: 20/30
OS_SC: 20/25
OS_SC+: -2

## 2021-12-13 ASSESSMENT — TONOMETRY
OD_IOP_MMHG: 14
OS_IOP_MMHG: 14
IOP_METHOD: APPLANATION

## 2021-12-13 ASSESSMENT — CUP TO DISC RATIO
OS_RATIO: 0.2
OD_RATIO: 0.25

## 2021-12-13 ASSESSMENT — SLIT LAMP EXAM - LIDS
COMMENTS: NORMAL
COMMENTS: NORMAL

## 2021-12-13 ASSESSMENT — EXTERNAL EXAM - LEFT EYE: OS_EXAM: NORMAL

## 2021-12-13 NOTE — PATIENT INSTRUCTIONS
"[facility]erral to [facility] for evaluation of choroidal nevus left eye    Continue using artificial tears up to four times a day (like Refresh Optive, Systane Balance, TheraTears, or generic artificial tears are ok. Avoid \"get the red out\" drops).     Recommend wearing glasses for distance when driving at night (Glasses prescription given)    Tiffanie Leroy MD  [phone]    "

## 2021-12-13 NOTE — PROGRESS NOTES
" Current Eye Medications:  Refresh 2-3 times daily both eyes.      Subjective:  Comprehensive eye exam. PT notes some blurriness in both eyes. Some glare problems with night driving. Pt uses otc readers +1.25 for near vision.   Pt states she fell face down on thanksgiving and broke her nose.       Objective:  See Ophthalmology Exam.        Assessment:  Karis Youssef is a 71 year old female who presents with:   Encounter Diagnoses   Name Primary?     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 Recommend retina specialist evaluation.       Plan:  Referral to [facility] for evaluation of choroidal nevus left eye    Continue using artificial tears up to four times a day (like Refresh Optive, Systane Balance, TheraTears, or generic artificial tears are ok. Avoid \"get the red out\" drops).     Recommend wearing glasses for distance when driving at night (Glasses prescription given)    Tiffanie Leroy MD  [phone]            "

## 2021-12-13 NOTE — LETTER
"    12/13/2021         RE: Karis Youssef  [address] [address]        Dear Colleague,    Thank you for referring your patient, Karis Youssef, to the [facility]. Please see a copy of my visit note below.     Current Eye Medications:  Refresh 2-3 times daily both eyes.      Subjective:  Comprehensive eye exam. PT notes some blurriness in both eyes. Some glare problems with night driving. Pt uses otc readers +1.25 for near vision.   Pt states she fell face down on thanksgiving and broke her nose.       Objective:  See Ophthalmology Exam.        Assessment:  Karis Youssef is a 71 year old female who presents with:   Encounter Diagnoses   Name Primary?     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 Recommend retina specialist evaluation.       Plan:  Referral to [facility] for evaluation of choroidal nevus left eye    Continue using artificial tears up to four times a day (like Refresh Optive, Systane Balance, TheraTears, or generic artificial tears are ok. Avoid \"get the red out\" drops).     Recommend wearing glasses for distance when driving at night (Glasses prescription given)    Tiffanie Leroy MD  [phone]                Again, thank you for allowing me to participate in the care of your patient.        Sincerely,        Tiffanie Leroy MD    "

## 2021-12-16 NOTE — PROGRESS NOTES
Chief Complaint - Nasal trauma    History of Present Illness - Karis Youssef is a 71 year old female who presents for evaluation of nasal trauma. The patient describes falling on face that happened over 3 weeks ago. The patient notes symptoms of a laceration of the nose and epistaxis. No pain. They suffered sprain in shoulder, but no other facial trauma. They note no nasal obstruction. They note no worse changes with the appearance of the nose. No chipped or loose teeth. Normal occlusion. I personally reviewed the images of the nasal x-ray from 11/24/21, which showed a hairline fracture on the dorsum of the nose.    Past Medical History -   Patient Active Problem List   Diagnosis     Thigh pain     Osteopenia     Sjogren's syndrome (H)     CARDIOVASCULAR SCREENING; LDL GOAL LESS THAN 160     Advanced directives, counseling/discussion     Palpitations     Acute conjunctivitis     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     Choroidal nevus, left     Acute pain of left shoulder     Displaced fracture of neck of right radius     Compression fracture of T9 vertebra, initial encounter (H)     Age-related osteoporosis with current pathological fracture with routine healing       Current Medications -   Current Outpatient Medications:      Calcium Carbonate-Vitamin D (CALCIUM 600 + D OR), Take 1,200 mg by mouth daily , Disp: , Rfl:      carboxymethylcellulose (CELLUVISC/REFRESH LIQUIGEL) 1 % ophthalmic solution, Place 1 drop into both eyes 3 times daily, Disp: , Rfl:      cetirizine (ZYRTEC) 10 MG tablet, Take 10 mg by mouth daily, Disp: , Rfl:      diclofenac (VOLTAREN) 1 % topical gel, Place onto the skin 4 times daily, Disp: 100 g, Rfl: 6     MAGNESIUM PO, Take 250 mg by mouth daily , Disp: , Rfl:      Polypodium Leucotomos (HELIOCARE) 240 MG CAPS, , Disp: , Rfl:      SUDAFED 30 MG OR TABS, 1 TABLET EVERY 4 TO 6 HOURS AS NEEDED, Disp: , Rfl:      VITAMIN C 500 MG OR TABS, 1 TABLET  DAILY, Disp: , Rfl:      Vitamin D,  Cholecalciferol, 25 MCG (1000 UT) TABS, , Disp: , Rfl:      VITAMIN E 400 UNIT PO CAPS, 1 CAPSULE DAILY, Disp: , Rfl:     Allergies -   Allergies   Allergen Reactions     Erythromycin Nausea     Tongue turned black.     Nsaids GI Disturbance     Gastritis          Social History -   Social History     Socioeconomic History     Marital status:      Spouse name: Not on file     Number of children: Not on file     Years of education: Not on file     Highest education level: Not on file   Occupational History     Not on file   Tobacco Use     Smoking status: Former Smoker     Packs/day: 0.00     Years: 0.00     Pack years: 0.00     Quit date: 1984     Years since quittin.9     Smokeless tobacco: Never Used     Tobacco comment: nonsmoking hosuehold   Vaping Use     Vaping Use: Never used   Substance and Sexual Activity     Alcohol use: Yes     Comment: 1 glass of wine one/two times a week     Drug use: No     Sexual activity: Not Currently     Partners: Male   Other Topics Concern     Parent/sibling w/ CABG, MI or angioplasty before 65F 55M? 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      Hobby Hazards Yes     Comment: gardening      Sleep Concern No     Stress Concern No     Weight Concern Yes     Comment: would like to lose 30 pounds     Special Diet Yes     Comment: no red meat, wheat, or sugar     Back Care No     Exercise Yes     Bike Helmet Yes     Seat Belt Yes     Self-Exams Yes   Social History Narrative     Not on file     Social Determinants of Health     Financial Resource Strain: Low Risk      Difficulty of Paying Living Expenses: Not hard at all   Food Insecurity: No Food Insecurity     Worried About Running Out of Food in the Last Year: Never true     Ran Out of Food in the Last Year: Never true   Transportation Needs: No Transportation Needs     Lack of Transportation (Medical): No     Lack of Transportation  (Non-Medical): No   Physical Activity: Sufficiently Active     Days of Exercise per Week: 4 days     Minutes of Exercise per Session: 40 min   Stress: No Stress Concern Present     Feeling of Stress : Not at all   Social Connections: Socially Isolated     Frequency of Communication with Friends and Family: More than three times a week     Frequency of Social Gatherings with Friends and Family: Once a week     Attends Jew Services: Never     Active Member of Clubs or Organizations: No     Attends Club or Organization Meetings: Not on file     Marital Status:    Intimate Partner Violence: Not on file   Housing Stability: Low Risk      Unable to Pay for Housing in the Last Year: No     Number of Places Lived in the Last Year: 1     Unstable Housing in the Last Year: No       Family History -   Family History   Problem Relation Age of Onset     C.A.D. Mother      Osteoporosis Mother      Hypertension Mother      Heart Disease Mother         CAD, CHF     Coronary Artery Disease Mother      Hyperlipidemia Mother      Diabetes Father      Hypertension Father      Cancer Brother         liver     Diabetes Brother      Diabetes Sister      Breast Cancer Sister 50     Hypertension Sister      Cancer Sister 72        non-hodgkins lymphoma     Cerebrovascular Disease Maternal Grandmother      Diabetes Paternal Grandfather      Diabetes Daughter         type 2     Connective Tissue Disorder Other         2 cousins and niece with MS; aunt with lupus     Rheumatoid Arthritis Cousin      Diabetes Niece      Hyperlipidemia Sister      Other Cancer Brother         Liver     Diabetes Brother      Other Cancer Sister         non-hodgkins lymphoma     Thyroid Disease Sister      Diabetes Sister      Hypertension Sister      Osteoporosis Sister      Cerebrovascular Disease Sister        Physical Exam  General - The patient is in no distress. Alert and oriented to person and place, answers questions and cooperates with  examination appropriately.   Neurologic - CN II-XII are grossly intact. No focal neurologic deficits.   Voice and Breathing - The patient was breathing comfortably without the use of accessory muscles. There was no wheezing, stridor, or stertor.  The patients voice was clear and strong.  Eyes - Extraocular movements intact. Sclera were not icteric or injected, conjunctiva were pink and moist. Normal intercanthal distance.  Mouth - Examination of the oral cavity showed pink, healthy oral mucosa. No lesions or ulcerations noted.  The tongue was mobile and midline, and the dentition were in good condition. Normal occlusion.  Throat - The walls of the oropharynx were smooth, pink, moist, symmetric, and had no lesions or ulcerations.  The tonsillar pillars and soft palate were symmetric.  The uvula was midline on elevation.   Neck -  Soft, non-tender. Palpation of the occipital, submental, submandibular, internal jugular chain, and supraclavicular nodes did not demonstrate any abnormal lymph nodes or masses. No parotid masses. Palpation of the thyroid was soft and smooth, with no nodules or goiter appreciated.  The trachea was mobile and midline.  Nose - External contour is symmetric, no gross deflection or scars. No ecchymosis. No step-offs. Tender nasal dorsum with callus formation and slight bump on dorsum with palpation. Nasal mucosa is pink and moist with clear mucus. The septum was midline and non-obstructive. Normal turbinate size. No polyps, masses, or purulence noted on examination.        A/P - Karis Youssef is a 71 year old female with nasal trauma suffered 3 weeks ago. This appears to be a nasal fracture. They have NO nasal obstruction and no worrisome change in the appearance of the nose.  Therefore I recommend observation.  She does have some tenderness still likely from callus formation.  She should avoid a nasal trauma.  She can do gentle massage and hot packs to help this.  Return as needed.    Champ  Cain PIZANO  Otolaryngology  [facility]

## 2021-12-17 ENCOUNTER — OFFICE VISIT (OUTPATIENT)
Dept: OTOLARYNGOLOGY | Facility: CLINIC | Age: 71
End: 2021-12-17
Payer: COMMERCIAL

## 2021-12-17 VITALS
DIASTOLIC BLOOD PRESSURE: 85 MMHG | SYSTOLIC BLOOD PRESSURE: 140 MMHG | OXYGEN SATURATION: 97 % | HEART RATE: 84 BPM | RESPIRATION RATE: 16 BRPM

## 2021-12-17 DIAGNOSIS — S02.2XXA CLOSED FRACTURE OF NASAL BONE, INITIAL ENCOUNTER: Primary | ICD-10-CM

## 2021-12-17 PROCEDURE — 99203 OFFICE O/P NEW LOW 30 MIN: CPT | Performed by: OTOLARYNGOLOGY

## 2021-12-17 NOTE — LETTER
12/17/2021         RE: Karis Youssef  [address] [place] [address]        Dear Colleague,    Thank you for referring your patient, Karis Youssef, to the [facility]. Please see a copy of my visit note below.    Chief Complaint - Nasal trauma    History of Present Illness - Karis Youssef is a 71 year old female who presents for evaluation of nasal trauma. The patient describes falling on face that happened over 3 weeks ago. The patient notes symptoms of a laceration of the nose and epistaxis. No pain. They suffered sprain in shoulder, but no other facial trauma. They note no nasal obstruction. They note no worse changes with the appearance of the nose. No chipped or loose teeth. Normal occlusion. I personally reviewed the images of the nasal x-ray from 11/24/21, which showed a hairline fracture on the dorsum of the nose.    Past Medical History -   Patient Active Problem List   Diagnosis     Thigh pain     Osteopenia     Sjogren's syndrome (H)     CARDIOVASCULAR SCREENING; LDL GOAL LESS THAN 160     Advanced directives, counseling/discussion     Palpitations     Acute conjunctivitis     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     Choroidal nevus, left     Acute pain of left shoulder     Displaced fracture of neck of right radius     Compression fracture of T9 vertebra, initial encounter (H)     Age-related osteoporosis with current pathological fracture with routine healing       Current Medications -   Current Outpatient Medications:      Calcium Carbonate-Vitamin D (CALCIUM 600 + D OR), Take 1,200 mg by mouth daily , Disp: , Rfl:      carboxymethylcellulose (CELLUVISC/REFRESH LIQUIGEL) 1 % ophthalmic solution, Place 1 drop into both eyes 3 times daily, Disp: , Rfl:      cetirizine (ZYRTEC) 10 MG tablet, Take 10 mg by mouth daily, Disp: , Rfl:      diclofenac (VOLTAREN) 1 % topical gel, Place onto the skin 4 times daily, Disp: 100 g, Rfl: 6     MAGNESIUM PO, Take 250 mg  by mouth daily , Disp: , Rfl:      Polypodium Leucotomos (HELIOCARE) 240 MG CAPS, , Disp: , Rfl:      SUDAFED 30 MG OR TABS, 1 TABLET EVERY 4 TO 6 HOURS AS NEEDED, Disp: , Rfl:      VITAMIN C 500 MG OR TABS, 1 TABLET  DAILY, Disp: , Rfl:      Vitamin D, Cholecalciferol, 25 MCG (1000 UT) TABS, , Disp: , Rfl:      VITAMIN E 400 UNIT PO CAPS, 1 CAPSULE DAILY, Disp: , Rfl:     Allergies -   Allergies   Allergen Reactions     Erythromycin Nausea     Tongue turned black.     Nsaids GI Disturbance     Gastritis          Social History -   Social History     Socioeconomic History     Marital status:      Spouse name: Not on file     Number of children: Not on file     Years of education: Not on file     Highest education level: Not on file   Occupational History     Not on file   Tobacco Use     Smoking status: Former Smoker     Packs/day: 0.00     Years: 0.00     Pack years: 0.00     Quit date: 1984     Years since quittin.9     Smokeless tobacco: Never Used     Tobacco comment: nonsmoking hosuehold   Vaping Use     Vaping Use: Never used   Substance and Sexual Activity     Alcohol use: Yes     Comment: 1 glass of wine one/two times a week     Drug use: No     Sexual activity: Not Currently     Partners: Male   Other Topics Concern     Parent/sibling w/ CABG, MI or angioplasty before 65F 55M? 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      Hobby Hazards Yes     Comment: gardening      Sleep Concern No     Stress Concern No     Weight Concern Yes     Comment: would like to lose 30 pounds     Special Diet Yes     Comment: no red meat, wheat, or sugar     Back Care No     Exercise Yes     Bike Helmet Yes     Seat Belt Yes     Self-Exams Yes   Social History Narrative     Not on file     Social Determinants of Health     Financial Resource Strain: Low Risk      Difficulty of Paying Living Expenses: Not hard at all   Food  Insecurity: No Food Insecurity     Worried About Running Out of Food in the Last Year: Never true     Ran Out of Food in the Last Year: Never true   Transportation Needs: No Transportation Needs     Lack of Transportation (Medical): No     Lack of Transportation (Non-Medical): No   Physical Activity: Sufficiently Active     Days of Exercise per Week: 4 days     Minutes of Exercise per Session: 40 min   Stress: No Stress Concern Present     Feeling of Stress : Not at all   Social Connections: Socially Isolated     Frequency of Communication with Friends and Family: More than three times a week     Frequency of Social Gatherings with Friends and Family: Once a week     Attends Orthodox Services: Never     Active Member of Clubs or Organizations: No     Attends Club or Organization Meetings: Not on file     Marital Status:    Intimate Partner Violence: Not on file   Housing Stability: Low Risk      Unable to Pay for Housing in the Last Year: No     Number of Places Lived in the Last Year: 1     Unstable Housing in the Last Year: No       Family History -   Family History   Problem Relation Age of Onset     C.A.D. Mother      Osteoporosis Mother      Hypertension Mother      Heart Disease Mother         CAD, CHF     Coronary Artery Disease Mother      Hyperlipidemia Mother      Diabetes Father      Hypertension Father      Cancer Brother         liver     Diabetes Brother      Diabetes Sister      Breast Cancer Sister 50     Hypertension Sister      Cancer Sister 72        non-hodgkins lymphoma     Cerebrovascular Disease Maternal Grandmother      Diabetes Paternal Grandfather      Diabetes Daughter         type 2     Connective Tissue Disorder Other         2 cousins and niece with MS; aunt with lupus     Rheumatoid Arthritis Cousin      Diabetes Niece      Hyperlipidemia Sister      Other Cancer Brother         Liver     Diabetes Brother      Other Cancer Sister         non-hodgkins lymphoma     Thyroid  Disease Sister      Diabetes Sister      Hypertension Sister      Osteoporosis Sister      Cerebrovascular Disease Sister        Physical Exam  General - The patient is in no distress. Alert and oriented to person and place, answers questions and cooperates with examination appropriately.   Neurologic - CN II-XII are grossly intact. No focal neurologic deficits.   Voice and Breathing - The patient was breathing comfortably without the use of accessory muscles. There was no wheezing, stridor, or stertor.  The patients voice was clear and strong.  Eyes - Extraocular movements intact. Sclera were not icteric or injected, conjunctiva were pink and moist. Normal intercanthal distance.  Mouth - Examination of the oral cavity showed pink, healthy oral mucosa. No lesions or ulcerations noted.  The tongue was mobile and midline, and the dentition were in good condition. Normal occlusion.  Throat - The walls of the oropharynx were smooth, pink, moist, symmetric, and had no lesions or ulcerations.  The tonsillar pillars and soft palate were symmetric.  The uvula was midline on elevation.   Neck -  Soft, non-tender. Palpation of the occipital, submental, submandibular, internal jugular chain, and supraclavicular nodes did not demonstrate any abnormal lymph nodes or masses. No parotid masses. Palpation of the thyroid was soft and smooth, with no nodules or goiter appreciated.  The trachea was mobile and midline.  Nose - External contour is symmetric, no gross deflection or scars. No ecchymosis. No step-offs. Tender nasal dorsum with callus formation and slight bump on dorsum with palpation. Nasal mucosa is pink and moist with clear mucus. The septum was midline and non-obstructive. Normal turbinate size. No polyps, masses, or purulence noted on examination.        A/P - Karis Youssef is a 71 year old female with nasal trauma suffered 3 weeks ago. This appears to be a nasal fracture. They have NO nasal obstruction and no  worrisome change in the appearance of the nose.  Therefore I recommend observation.  She does have some tenderness still likely from callus formation.  She should avoid a nasal trauma.  She can do gentle massage and hot packs to help this.  Return as needed.    Champ Barlow MD  Otolaryngology  [facility]        Again, thank you for allowing me to participate in the care of your patient.        Sincerely,        Champ Barlow MD

## 2021-12-22 ENCOUNTER — LAB (OUTPATIENT)
Dept: LAB | Facility: CLINIC | Age: 71
End: 2021-12-22
Payer: COMMERCIAL

## 2021-12-22 DIAGNOSIS — Z13.1 SCREENING FOR DIABETES MELLITUS: ICD-10-CM

## 2021-12-22 DIAGNOSIS — Z13.220 LIPID SCREENING: ICD-10-CM

## 2021-12-22 DIAGNOSIS — Z13.29 SCREENING FOR THYROID DISORDER: ICD-10-CM

## 2021-12-22 LAB
CHOLEST SERPL-MCNC: 235 MG/DL
FASTING STATUS PATIENT QL REPORTED: YES
FASTING STATUS PATIENT QL REPORTED: YES
GLUCOSE BLD-MCNC: 103 MG/DL (ref 70–99)
HDLC SERPL-MCNC: 73 MG/DL
LDLC SERPL CALC-MCNC: 139 MG/DL
NONHDLC SERPL-MCNC: 162 MG/DL
TRIGL SERPL-MCNC: 114 MG/DL
TSH SERPL DL<=0.005 MIU/L-ACNC: 2.68 MU/L (ref 0.4–4)

## 2021-12-22 PROCEDURE — 84443 ASSAY THYROID STIM HORMONE: CPT

## 2021-12-22 PROCEDURE — 36415 COLL VENOUS BLD VENIPUNCTURE: CPT

## 2021-12-22 PROCEDURE — 82947 ASSAY GLUCOSE BLOOD QUANT: CPT

## 2021-12-22 PROCEDURE — 80061 LIPID PANEL: CPT

## 2021-12-23 NOTE — RESULT ENCOUNTER NOTE
Richard Dyson,    Thank you for your recent office visit.    Here are your recent results.  Labs look pretty good.  If you have a strong family hx of coronary artery disease, let me know and I will send in a cholesterol lowering medication.  Otherwise work on regular exercise and diet changes.     Feel free to contact me via [employer] or call the clinic at [phone].    Sincerely,    JULIO CESAR Donovan, FNP-BC

## 2021-12-30 ENCOUNTER — TRANSFERRED RECORDS (OUTPATIENT)
Dept: HEALTH INFORMATION MANAGEMENT | Facility: CLINIC | Age: 71
End: 2021-12-30
Payer: COMMERCIAL

## 2022-01-03 ENCOUNTER — OFFICE VISIT (OUTPATIENT)
Dept: DERMATOLOGY | Facility: CLINIC | Age: 72
End: 2022-01-03
Payer: COMMERCIAL

## 2022-01-03 DIAGNOSIS — L57.8 SUN-DAMAGED SKIN: ICD-10-CM

## 2022-01-03 DIAGNOSIS — L82.1 SEBORRHEIC KERATOSIS: ICD-10-CM

## 2022-01-03 DIAGNOSIS — L65.9 LOSS OF HAIR: ICD-10-CM

## 2022-01-03 DIAGNOSIS — L71.9 ROSACEA: Primary | ICD-10-CM

## 2022-01-03 DIAGNOSIS — D22.9 MULTIPLE BENIGN NEVI: ICD-10-CM

## 2022-01-03 DIAGNOSIS — D18.01 CHERRY ANGIOMA: ICD-10-CM

## 2022-01-03 DIAGNOSIS — L81.4 SOLAR LENTIGO: ICD-10-CM

## 2022-01-03 PROCEDURE — 99214 OFFICE O/P EST MOD 30 MIN: CPT | Performed by: DERMATOLOGY

## 2022-01-03 RX ORDER — METRONIDAZOLE 7.5 MG/G
GEL TOPICAL
Qty: 45 G | Refills: 11 | Status: SHIPPED | OUTPATIENT
Start: 2022-01-03 | End: 2023-09-25

## 2022-01-03 NOTE — PROGRESS NOTES
[facility] Dermatology Note  Encounter Date: Yeyo 3, 2022  Office Visit     Dermatology Problem List:  Last skin check 1/3/22  1. AKs. LiqN2.   - AK, right upper cutaneous lip, s/p bx 2/8/21  2. History of Sjogrens. ZELDA+, Ro+.    3. Acne rosacea  - Metronidazole 0.75% gel BID  4. Telogen effluvium  - Discussed rogaine, patient will consider  ____________________________________________    Assessment & Plan:    # Benign lesions: Multiple benign nevi, solar lentigos, seborrheic keratoses, cherry angiomas. Explained to patient benign nature of lesion. No treatment is necessary at this time unless the lesion changes or becomes symptomatic.   - ABCDs of melanoma were discussed and self skin checks were advised.  - Sun precaution was advised including the use of sun screens of SPF 30 or higher, sun protective clothing, and avoidance of tanning beds.  - Continue heliocare supplement.    # Biopsy proven AK, right upper cutaneous lip.  - Resolved.    # Telangiectasias, right ankle.  - Discussed cosmetic treatment option of PDL. Patient declined, as they are not bothered by this.    # Acne rosacea. Chronic, flared.  - Start metronidazole 0.75% gel BID PRN.    # Non-scarring alopecia, most consistent with telogen effluvium. Chronic, flare.   - Today, we discussed telogen effluvium is typically a temporary hair shedding that occurs 3-4 months after a stressful event. Patient does note being stressed over the last few months. I reviewed that this diagnosis typically stops anytime between 6-24 months.  - Recommended trial of Rogaine 5% foam nightly. Information given in AVS. Patient will consider.  - Patient will schedule another appointment if would like to discuss further treatment.    # Corns/calluses.  - Recommended OTC urea 20% cream. Discussed this can be found on [employer].  - Recommended OTC salicylic corn pads nightly.    Procedures Performed:   None.    Follow-up:1 year in-person for skin check, or  earlier for new or changing lesions.    Staff and Scribe:     Scribe Disclosure:   I, Mary Jeyson, am serving as a scribe to document services personally performed by this physician, Dr. Adelfo Berrios, based on data collection and the provider's statements to me.    Provider Disclosure:   The documentation recorded by the scribe accurately reflects the services I personally performed and the decisions made by me.    Adelfo Berrios MD    Department of Dermatology  [facility]: Phone: [phone], Fax:[phone]  [facility]: Phone: [phone] Fax: [phone]  ____________________________________________    CC: Skin Check (Full body skin check. Spot of concern on right ankle. No personal history of skin cancer. Family history of skin cancer in sister- NMSC.)    HPI:  Ms. Karis Youssef is a(n) 71 year old female who presents today as a return patient for skin check.    Last skin check was 2020. Cryotherapy was performed on 3 AKs (left nasal sidewall, left cheek, and right upper cutaneous lip). Plan was to recheck in 6-8 weeks to ensure resolution.    Patient was seen again on 21. A biopsy determined an AK on the right upper cutaneous lip. Cryotherapy was also performed on an AK of the right nasal tip.    Today, Karis notes the followin. An area that blanches on the right ankle. Not itchy. Not bothersome, just noticed it a couple months ago.  2. Requesting the lip be rechecked. Believes this healed well.  3. Having a recent ablation of the nose after falling on the tar. Used bacitracin for a while. Sometimes gets pimples.  4. Concerns of hair shedding more-so over the last 6 months. There have been numerous stressful events for the patient.  5. Possible warts on the feet.    The patient otherwise denies any new or concerning lesions. No bleeding, painful, pruritic, or  changing lesions. Health otherwise stable. No other skin concerns.    Labs Reviewed:  Derm path from 2/8/21 reviewed.    FINAL DIAGNOSIS:   Skin, right upper cutaneous lip, shave:   - Actinic keratosis - (see comment)     COMMENT:   The lesion extends to the deep margin via follicular extension.  Thus if it persists or recurs, additional sampling is recommended.     Physical Exam:  Vitals: There were no vitals taken for this visit.  SKIN: Full skin, which includes the head/face, both arms, chest, back, abdomen, both legs, genitalia and/or groin, buttocks, digits and/or nails was examined.  - Brown macules on sun exposed areas  - Brown waxy, stuck-on appearing papules on face, trunk, and extremities.   - Brown macules and papules on trunk and extremities without concerning dermoscopy features  - Red vascular papules on face, trunk and extremities.   - Well healed biopsy scar on the right upper cutaneous lip.  - Telangiectasias on the right ankle  - Few papules and pustules on the nose.  - There is mild decreased hair density noted over the vertex scalp and crown of scalp.  - No sign of erythema or scale on the scalp.  - Hair pull test is positive on the crown.  - Eyelashes and eyebrows are normal.  - No other lesions of concern on areas examined.     Medications:  Current Outpatient Medications   Medication     Calcium Carbonate-Vitamin D (CALCIUM 600 + D OR)     carboxymethylcellulose (CELLUVISC/REFRESH LIQUIGEL) 1 % ophthalmic solution     cetirizine (ZYRTEC) 10 MG tablet     diclofenac (VOLTAREN) 1 % topical gel     MAGNESIUM PO     metroNIDAZOLE (METROGEL) 0.75 % external gel     Multiple Vitamins-Minerals (ICAPS AREDS 2 PO)     Polypodium Leucotomos (HELIOCARE) 240 MG CAPS     SUDAFED 30 MG OR TABS     VITAMIN C 500 MG OR TABS     Vitamin D, Cholecalciferol, 25 MCG (1000 UT) TABS     VITAMIN E 400 UNIT PO CAPS     No current facility-administered medications for this visit.      Past Medical History:   Patient  Active Problem List   Diagnosis     Thigh pain     Osteopenia     Sjogren's syndrome (H)     CARDIOVASCULAR SCREENING; LDL GOAL LESS THAN 160     Advanced directives, counseling/discussion     Palpitations     Acute conjunctivitis     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     Choroidal nevus, left     Acute pain of left shoulder     Displaced fracture of neck of right radius     Compression fracture of T9 vertebra, initial encounter (H)     Age-related osteoporosis with current pathological fracture with routine healing     Past Medical History:   Diagnosis Date     Abnormal Pap smear 1995    had colpo 1995-hyperplasia, nl since     Arthritis      Chronic obstructive pulmonary disease (H)      Chronic obstructive pulmonary disease, unspecified COPD type (H)      Disturbance of skin sensation      Osteopenia      Osteopenia      Seasonal allergies      Sjogren's syndrome (H)

## 2022-01-03 NOTE — LETTER
1/3/2022         RE: Karis Youssef  [address]  [place] [address]        Dear Colleague,    Thank you for referring your patient, Karis Youssef, to the [facility]. Please see a copy of my visit note below.    [facility] Dermatology Note  Encounter Date: Yeyo 3, 2022  Office Visit     Dermatology Problem List:  Last skin check 1/3/22  1. AKs. LiqN2.   - AK, right upper cutaneous lip, s/p bx 2/8/21  2. History of Sjogrens. ZELDA+, Ro+.    3. Acne rosacea  - Metronidazole 0.75% gel BID  4. Telogen effluvium  - Discussed rogaine, patient will consider  ____________________________________________    Assessment & Plan:    # Benign lesions: Multiple benign nevi, solar lentigos, seborrheic keratoses, cherry angiomas. Explained to patient benign nature of lesion. No treatment is necessary at this time unless the lesion changes or becomes symptomatic.   - ABCDs of melanoma were discussed and self skin checks were advised.  - Sun precaution was advised including the use of sun screens of SPF 30 or higher, sun protective clothing, and avoidance of tanning beds.  - Continue heliocare supplement.    # Biopsy proven AK, right upper cutaneous lip.  - Resolved.    # Telangiectasias, right ankle.  - Discussed cosmetic treatment option of PDL. Patient declined, as they are not bothered by this.    # Acne rosacea. Chronic, flared.  - Start metronidazole 0.75% gel BID PRN.    # Non-scarring alopecia, most consistent with telogen effluvium. Chronic, flare.   - Today, we discussed telogen effluvium is typically a temporary hair shedding that occurs 3-4 months after a stressful event. Patient does note being stressed over the last few months. I reviewed that this diagnosis typically stops anytime between 6-24 months.  - Recommended trial of Rogaine 5% foam nightly. Information given in AVS. Patient will consider.  - Patient will schedule another appointment if would like  to discuss further treatment.    # Corns/calluses.  - Recommended OTC urea 20% cream. Discussed this can be found on Amazon.  - Recommended OTC salicylic corn pads nightly.    Procedures Performed:   None.    Follow-up:1 year in-person for skin check, or earlier for new or changing lesions.    Staff and Scribe:     Scribe Disclosure:   I, Marycollins Benítez, am serving as a scribe to document services personally performed by this physician, Dr. Adelfo Berrios, based on data collection and the provider's statements to me.    Provider Disclosure:   The documentation recorded by the scribe accurately reflects the services I personally performed and the decisions made by me.    Adelfo Berrios MD    Department of Dermatology  [facility]: Phone: [phone], Fax:[phone]  [facility]: Phone: [phone] Fax: [phone]  ____________________________________________    CC: Skin Check (Full body skin check. Spot of concern on right ankle. No personal history of skin cancer. Family history of skin cancer in sister- NMSC.)    HPI:  Ms. Karis Youssef is a(n) 71 year old female who presents today as a return patient for skin check.    Last skin check was 2020. Cryotherapy was performed on 3 AKs (left nasal sidewall, left cheek, and right upper cutaneous lip). Plan was to recheck in 6-8 weeks to ensure resolution.    Patient was seen again on 21. A biopsy determined an AK on the right upper cutaneous lip. Cryotherapy was also performed on an AK of the right nasal tip.    Today, Karis notes the followin. An area that blanches on the right ankle. Not itchy. Not bothersome, just noticed it a couple months ago.  2. Requesting the lip be rechecked. Believes this healed well.  3. Having a recent ablation of the nose after falling on the tar. Used bacitracin for a while. Sometimes gets  pimples.  4. Concerns of hair shedding more-so over the last 6 months. There have been numerous stressful events for the patient.  5. Possible warts on the feet.    The patient otherwise denies any new or concerning lesions. No bleeding, painful, pruritic, or changing lesions. Health otherwise stable. No other skin concerns.    Labs Reviewed:  Derm path from 2/8/21 reviewed.    FINAL DIAGNOSIS:   Skin, right upper cutaneous lip, shave:   - Actinic keratosis - (see comment)     COMMENT:   The lesion extends to the deep margin via follicular extension.  Thus if it persists or recurs, additional sampling is recommended.     Physical Exam:  Vitals: There were no vitals taken for this visit.  SKIN: Full skin, which includes the head/face, both arms, chest, back, abdomen, both legs, genitalia and/or groin, buttocks, digits and/or nails was examined.  - Brown macules on sun exposed areas  - Brown waxy, stuck-on appearing papules on face, trunk, and extremities.   - Brown macules and papules on trunk and extremities without concerning dermoscopy features  - Red vascular papules on face, trunk and extremities.   - Well healed biopsy scar on the right upper cutaneous lip.  - Telangiectasias on the right ankle  - Few papules and pustules on the nose.  - There is mild decreased hair density noted over the vertex scalp and crown of scalp.  - No sign of erythema or scale on the scalp.  - Hair pull test is positive on the crown.  - Eyelashes and eyebrows are normal.  - No other lesions of concern on areas examined.     Medications:  Current Outpatient Medications   Medication     Calcium Carbonate-Vitamin D (CALCIUM 600 + D OR)     carboxymethylcellulose (CELLUVISC/REFRESH LIQUIGEL) 1 % ophthalmic solution     cetirizine (ZYRTEC) 10 MG tablet     diclofenac (VOLTAREN) 1 % topical gel     MAGNESIUM PO     metroNIDAZOLE (METROGEL) 0.75 % external gel     Multiple Vitamins-Minerals (ICAPS AREDS 2 PO)     Polypodium Leucotomos  (HELIOCARE) 240 MG CAPS     SUDAFED 30 MG OR TABS     VITAMIN C 500 MG OR TABS     Vitamin D, Cholecalciferol, 25 MCG (1000 UT) TABS     VITAMIN E 400 UNIT PO CAPS     No current facility-administered medications for this visit.      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     Choroidal nevus, left     Acute pain of left shoulder     Displaced fracture of neck of right radius     Compression fracture of T9 vertebra, initial encounter (H)     Age-related osteoporosis with current pathological fracture with routine healing     Past Medical History:   Diagnosis Date     Abnormal Pap smear 1995    had colpo 1995-hyperplasia, nl since     Arthritis      Chronic obstructive pulmonary disease (H)      Chronic obstructive pulmonary disease, unspecified COPD type (H)      Disturbance of skin sensation      Osteopenia      Osteopenia      Seasonal allergies      Sjogren's syndrome (H)            Again, thank you for allowing me to participate in the care of your patient.        Sincerely,        Adelfo Berrios MD

## 2022-01-03 NOTE — NURSING NOTE
Karis Youssef's goals for this visit include:   Chief Complaint   Patient presents with     Skin Check     Full body skin check. Spot of concern on right ankle. No personal history of skin cancer. Family history of skin cancer in sister- NMSC.       She requests these members of her care team be copied on today's visit information:     PCP: Josee Carpenter    Referring Provider:  Referred Self, MD  No address on file    There were no vitals taken for this visit.    Do you need any medication refills at today's visit? No  Debo Leon CMA

## 2022-01-03 NOTE — PATIENT INSTRUCTIONS
"For hair loss:    I suspect this telogen effluvium. This tends to be a temporary hair shedding that occurs 3-4 months after a stressful event. I expect this will stop anywhere between 6 and 24 months. In the meantime, you are welcome to start Rogaine \"Minoxidil\" 5% foam nightly.       For the feet:    - I recommend using Urea 20% cream nightly. This can be found at www.Amazon.com.  - There are also salicylic acid corn pads you can use. These are found over-the-counter.      For the nose:    - I am prescribing metronidazole 0.75% gel. You can use this twice daily for at least 4-6 weeks.  "

## 2022-01-07 ENCOUNTER — ANCILLARY PROCEDURE (OUTPATIENT)
Dept: MAMMOGRAPHY | Facility: CLINIC | Age: 72
End: 2022-01-07
Attending: NURSE PRACTITIONER
Payer: COMMERCIAL

## 2022-01-07 DIAGNOSIS — Z12.31 ENCOUNTER FOR SCREENING MAMMOGRAM FOR BREAST CANCER: ICD-10-CM

## 2022-01-07 PROCEDURE — 77067 SCR MAMMO BI INCL CAD: CPT | Mod: TC | Performed by: RADIOLOGY

## 2022-03-31 ENCOUNTER — TRANSFERRED RECORDS (OUTPATIENT)
Dept: HEALTH INFORMATION MANAGEMENT | Facility: CLINIC | Age: 72
End: 2022-03-31
Payer: COMMERCIAL

## 2022-04-07 ENCOUNTER — DOCUMENTATION ONLY (OUTPATIENT)
Dept: OPHTHALMOLOGY | Facility: CLINIC | Age: 72
End: 2022-04-07
Payer: COMMERCIAL

## 2022-06-01 ENCOUNTER — TRANSFERRED RECORDS (OUTPATIENT)
Dept: MULTI SPECIALTY CLINIC | Facility: CLINIC | Age: 72
End: 2022-06-01

## 2022-06-01 LAB — RETINOPATHY: NORMAL

## 2022-06-02 ENCOUNTER — OFFICE VISIT (OUTPATIENT)
Dept: RHEUMATOLOGY | Facility: CLINIC | Age: 72
End: 2022-06-02
Payer: COMMERCIAL

## 2022-06-02 VITALS
HEART RATE: 73 BPM | BODY MASS INDEX: 39.05 KG/M2 | DIASTOLIC BLOOD PRESSURE: 80 MMHG | SYSTOLIC BLOOD PRESSURE: 126 MMHG | WEIGHT: 229 LBS | OXYGEN SATURATION: 96 %

## 2022-06-02 DIAGNOSIS — M35.01 SJOGREN'S SYNDROME WITH KERATOCONJUNCTIVITIS SICCA (H): ICD-10-CM

## 2022-06-02 DIAGNOSIS — M80.00XD AGE-RELATED OSTEOPOROSIS WITH CURRENT PATHOLOGICAL FRACTURE WITH ROUTINE HEALING: Primary | ICD-10-CM

## 2022-06-02 DIAGNOSIS — Z92.29 HISTORY OF BISPHOSPHONATE THERAPY: ICD-10-CM

## 2022-06-02 DIAGNOSIS — Z78.0 POST-MENOPAUSAL: ICD-10-CM

## 2022-06-02 LAB
ALBUMIN SERPL-MCNC: 3.9 G/DL (ref 3.4–5)
BASOPHILS # BLD AUTO: 0 10E3/UL (ref 0–0.2)
BASOPHILS NFR BLD AUTO: 0 %
CALCIUM SERPL-MCNC: 9.9 MG/DL (ref 8.5–10.1)
CREAT SERPL-MCNC: 0.65 MG/DL (ref 0.52–1.04)
EOSINOPHIL # BLD AUTO: 0.4 10E3/UL (ref 0–0.7)
EOSINOPHIL NFR BLD AUTO: 4 %
ERYTHROCYTE [DISTWIDTH] IN BLOOD BY AUTOMATED COUNT: 13.8 % (ref 10–15)
GFR SERPL CREATININE-BSD FRML MDRD: >90 ML/MIN/1.73M2
HCT VFR BLD AUTO: 41.6 % (ref 35–47)
HGB BLD-MCNC: 13.1 G/DL (ref 11.7–15.7)
LYMPHOCYTES # BLD AUTO: 3 10E3/UL (ref 0.8–5.3)
LYMPHOCYTES NFR BLD AUTO: 36 %
MCH RBC QN AUTO: 29.2 PG (ref 26.5–33)
MCHC RBC AUTO-ENTMCNC: 31.5 G/DL (ref 31.5–36.5)
MCV RBC AUTO: 93 FL (ref 78–100)
MONOCYTES # BLD AUTO: 0.9 10E3/UL (ref 0–1.3)
MONOCYTES NFR BLD AUTO: 10 %
NEUTROPHILS # BLD AUTO: 4.3 10E3/UL (ref 1.6–8.3)
NEUTROPHILS NFR BLD AUTO: 50 %
PLATELET # BLD AUTO: 328 10E3/UL (ref 150–450)
RBC # BLD AUTO: 4.49 10E6/UL (ref 3.8–5.2)
WBC # BLD AUTO: 8.5 10E3/UL (ref 4–11)

## 2022-06-02 PROCEDURE — 36415 COLL VENOUS BLD VENIPUNCTURE: CPT | Performed by: INTERNAL MEDICINE

## 2022-06-02 PROCEDURE — 99214 OFFICE O/P EST MOD 30 MIN: CPT | Performed by: INTERNAL MEDICINE

## 2022-06-02 PROCEDURE — 82040 ASSAY OF SERUM ALBUMIN: CPT | Performed by: INTERNAL MEDICINE

## 2022-06-02 PROCEDURE — 82310 ASSAY OF CALCIUM: CPT | Performed by: INTERNAL MEDICINE

## 2022-06-02 PROCEDURE — 82565 ASSAY OF CREATININE: CPT | Performed by: INTERNAL MEDICINE

## 2022-06-02 PROCEDURE — 82306 VITAMIN D 25 HYDROXY: CPT | Performed by: INTERNAL MEDICINE

## 2022-06-02 PROCEDURE — 85025 COMPLETE CBC W/AUTO DIFF WBC: CPT | Performed by: INTERNAL MEDICINE

## 2022-06-02 RX ORDER — EPINEPHRINE 1 MG/ML
0.3 INJECTION, SOLUTION, CONCENTRATE INTRAVENOUS EVERY 5 MIN PRN
Status: CANCELLED | OUTPATIENT
Start: 2022-07-01

## 2022-06-02 RX ORDER — ZOLEDRONIC ACID 5 MG/100ML
5 INJECTION, SOLUTION INTRAVENOUS ONCE
Status: CANCELLED
Start: 2022-07-01 | End: 2022-07-01

## 2022-06-02 RX ORDER — METHYLPREDNISOLONE SODIUM SUCCINATE 125 MG/2ML
125 INJECTION, POWDER, LYOPHILIZED, FOR SOLUTION INTRAMUSCULAR; INTRAVENOUS
Status: CANCELLED
Start: 2022-07-01

## 2022-06-02 RX ORDER — HEPARIN SODIUM,PORCINE 10 UNIT/ML
5 VIAL (ML) INTRAVENOUS
Status: CANCELLED | OUTPATIENT
Start: 2022-07-01

## 2022-06-02 RX ORDER — DIPHENHYDRAMINE HYDROCHLORIDE 50 MG/ML
50 INJECTION INTRAMUSCULAR; INTRAVENOUS
Status: CANCELLED
Start: 2022-07-01

## 2022-06-02 RX ORDER — NALOXONE HYDROCHLORIDE 0.4 MG/ML
0.2 INJECTION, SOLUTION INTRAMUSCULAR; INTRAVENOUS; SUBCUTANEOUS
Status: CANCELLED | OUTPATIENT
Start: 2022-07-01

## 2022-06-02 RX ORDER — MEPERIDINE HYDROCHLORIDE 25 MG/ML
25 INJECTION INTRAMUSCULAR; INTRAVENOUS; SUBCUTANEOUS EVERY 30 MIN PRN
Status: CANCELLED | OUTPATIENT
Start: 2022-07-01

## 2022-06-02 RX ORDER — HEPARIN SODIUM (PORCINE) LOCK FLUSH IV SOLN 100 UNIT/ML 100 UNIT/ML
5 SOLUTION INTRAVENOUS
Status: CANCELLED | OUTPATIENT
Start: 2022-07-01

## 2022-06-02 RX ORDER — ALBUTEROL SULFATE 90 UG/1
1-2 AEROSOL, METERED RESPIRATORY (INHALATION)
Status: CANCELLED
Start: 2022-07-01

## 2022-06-02 RX ORDER — ALBUTEROL SULFATE 0.83 MG/ML
2.5 SOLUTION RESPIRATORY (INHALATION)
Status: CANCELLED | OUTPATIENT
Start: 2022-07-01

## 2022-06-02 NOTE — PATIENT INSTRUCTIONS
RHEUMATOLOGY    Dr. Finesse Saunders    [address]  [place] [address]  Phone number: [phone]  Fax number: [phone]      Thank you for choosing [facility]!    Anne Costello CMA Rheumatology    ----------------------------    Please call to schedule the next DEXA (bone density scan) to be on or after 6/12/2023    [facility]  [phone]    Please call to schedule the next reclast infusion to be after 7/1/2022 (we discussed having this done in August 2022)    [facility]  [address] [place] [address]  [phone]      Please call [phone]  anytime after July 5th to schedule a follow up with Dr. Saunders in July 2023.

## 2022-06-02 NOTE — PROGRESS NOTES
Rheumatology Clinic Visit      Karis Youssef MRN# [id number]   YOB: 1950 Age: 72 year old      Date of visit: 6/02/22   PCP: Dr. Josee Carpenter    Chief Complaint   Patient presents with:  Sjogren's Syndrome  Osteoporosis    Assessment and Plan     1. Sjogren's Syndrome (ZELDA+, SSA+, sicca symptoms): Diagnosed in 2009 by Dr. Delatorre. Currently doing well with frequent sips of water and artificial tears.    - Lab today: CBC    2.  History of bilateral knee pain:  Osteoarthritis and meniscal tears based on patient history and following with orthopedic surgeon at [facility].  Symptoms are degenerative in nature.  She does not limit her activities because of knee pain.      3.  Osteoporosis: Vertebral compression fracture seen on 5/18/2021 MRI of the lumbar spine performed at [facility].  Previously had a diagnosis of osteopenia based on 2017 DEXA.   She has a history of being on Fosamax in the past because of significant heartburn; she also reports having easy heartburn with any NSAID.  Therefore, will avoided oral bisphosphonates and started zoledronic acid, with the first dose on 7/1/2021.  She plans to see her dentist this July and have her next Reclast infusion in August 2022.  Recheck DEXA on or after 6/12/2023.  Follow-up in July 2023.  - Continue zoledronic acid 5 mg IV once yearly, patient to call to schedule  - Continue vitamin D 1000 IU daily  - Continue calcium 1200 mg daily  - Labs today: Calcium, vitamin D, creatinine, albumin  - DEXA ordered to be on or after 6/12/2023, patient to call to schedule    Total minutes spent in evaluation with patient, documentation, , and review of pertinent studies and chart notes: 20     Ms. Youssef verbalized agreement with and understanding of the rational for the diagnosis and treatment plan.  All questions were answered to best of my ability and the patient's satisfaction. Ms. Youssef was advised to contact the clinic with any questions  that may arise after the clinic visit.      Thank you for involving me in the care of the patient    Return to clinic: July 2023, sooner if needed    HPI   Karis Youssef is a 72 year old female with a past medical history significant for Sjogren's syndrome and osteoporosis who is seen for rheumatology follow-up    4/23/2013 rheumatology clinic note by Dr. Kwabena Delatorre documents that the patient was following up for a positive SSA and sicca symptoms..  Positive ZELDA.  Positive SSB.  Does not have evidence of other connective tissue disease.  No arthritis, vasculitis, and her review of systems for respiratory, cardiovascular, and CNS is normal.  No rash or parotid swelling.  No new neurologic symptoms.  No lymphadenopathy.  He advised lip biopsy to confirm.    10/4/2018 clinic note by Dr. Carpenter documents Sjogren's with possibly increasing symptoms so was referred to rheumatology.    2/19/2019: Ms. Youssef reports that she has had joint issues most of her life. TMJ tx'd by not chewing gum.  Bilateral knee pain: started 20 yrs ago with torn meniscus, and now bone on bone and was told she may need TKA. Left knee sometimes gives out so she uses hiking poles; was able to hike all over [place] going up and down stairs with worsening knee pain that resolved with rest and acupuncture.  Plans to retire in May 2019.  Fell last year with distal radius and distal ulna fracture s/p ORIF.   Some stiffness in the dorsal midfeet for 10 minutes; and sometimes in the left shoulder that resolves within 10 minutes. Hx of left frozen shoulder that resolved with physical therapy. There was a concern for COPD at one point but she saw a pulmonologist who told her that she does not have COPD.  Thicker respiratory secretions in the morning that takes a little bit of time to get them going; she was told that she may have postnasal drip by her allergist and is taking Claritin for this.  Dry mouth is treated with frequent sips of water; she  reports seeing a dentist twice yearly and has not had recent cavities.  Dry eyes are treated with artificial tears 3 times daily; she does not notice a difference between the ones that are preservative-free versus the ones that have preservative.  She tells me that her ophthalmologist recommended she take Claritin for allergies.    Currently doing well.  Following with her dentist regularly6/3/2021: She reports that she is coming in today because of osteoporosis.  Regarding Sjogren's, she says that everything is stable.  She continues to use frequent sips of water and reports having fairly good oral dentition but does note having a history of TMJ in the past and does grind her teeth.  She follows with the dentist regularly.  She also continues to use artificial tears 1-2 times per day with good control of her dry eyes most recently she was installing a fence and bending over to put screws in at the bottom of the fence; she felt some pain in her back that was evaluated and found to be a vertebral compression fracture.  She has followed with a surgeon who told her that the compression fracture have so far appeared stable and therefore no intervention from surgical or interventional radiology perspective is needed at this time.  She is currently on calcitonin that has helped some with the pain.  She finds it Tylenol 1000 mg twice daily is also effective for her back pain.  She is here to discuss osteoporosis treatment.  She notes in the past she has had significant heartburn with Fosamax and any NSAID.    Today, 6/2/2022: Currently doing well.  Did not feel well after last Reclast infusion but hoping the next infusion is better tolerated.  Dry eye well controlled with eyedrops as needed.  Dry mouth controlled with frequent sips of water.  Following with a dentist regularly and has not had recent dental caries.  No night sweats.  No unintentional weight loss.      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 [employer]    ROS   12 point review of system was completed and negative except as noted in the HPI     Active Problem List     Patient Active Problem List   Diagnosis     Thigh pain     Osteopenia     Sjogren's syndrome (H)     CARDIOVASCULAR SCREENING; LDL GOAL LESS THAN 160     Advanced directives, counseling/discussion     Palpitations     Acute conjunctivitis     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     Choroidal nevus, left     Acute pain of left shoulder     Displaced fracture of neck of right radius     Compression fracture of T9 vertebra, initial encounter (H)     Age-related osteoporosis with current pathological fracture with routine healing     Past Medical History     Past Medical History:   Diagnosis Date     Abnormal Pap smear 1995    had colpo 1995-hyperplasia, nl since     Arthritis      Chronic obstructive pulmonary disease (H)      Chronic obstructive pulmonary disease, unspecified COPD type (H)      Disturbance of skin sensation      Osteopenia      Osteopenia      Seasonal allergies      Sjogren's syndrome (H)      Past Surgical History     Past Surgical History:   Procedure Laterality Date     BIOPSY  2000    right breast lumpectomy     BREAST LUMPECTOMY, RT/LT      right breast, benign     CATARACT IOL, RT/LT      bilateral     ORTHOPEDIC SURGERY  01/2018    right radius      Allergy     Allergies   Allergen Reactions     Erythromycin Nausea     Tongue turned black.     Nsaids GI Disturbance     Gastritis        Current Medication List     Current Outpatient Medications   Medication Sig     Calcium Carbonate-Vitamin D (CALCIUM 600 + D OR) Take 1,200 mg by mouth daily      carboxymethylcellulose (CELLUVISC/REFRESH LIQUIGEL) 1 % ophthalmic solution Place 1 drop into both eyes 3 times daily     cetirizine (ZYRTEC) 10 MG tablet Take 10 mg by mouth daily     diclofenac (VOLTAREN) 1 % topical gel Place onto the skin 4  times daily     MAGNESIUM PO Take 250 mg by mouth daily      metroNIDAZOLE (METROGEL) 0.75 % external gel Apply twice daily as needed to face for rosacea.     Multiple Vitamins-Minerals (ICAPS AREDS 2 PO) Take 1 tablet by mouth 2 times daily     Polypodium Leucotomos (HELIOCARE) 240 MG CAPS      SUDAFED 30 MG OR TABS 1 TABLET EVERY 4 TO 6 HOURS AS NEEDED     VITAMIN C 500 MG OR TABS 1 TABLET  DAILY     Vitamin D, Cholecalciferol, 25 MCG (1000 UT) TABS daily      VITAMIN E 400 UNIT PO CAPS 1 CAPSULE DAILY     No current facility-administered medications for this visit.         Social History   See HPI    Family History     Family History   Problem Relation Age of Onset     C.A.D. Mother      Osteoporosis Mother      Hypertension Mother      Heart Disease Mother         CAD, CHF     Coronary Artery Disease Mother      Hyperlipidemia Mother      Diabetes Father      Hypertension Father      Cancer Brother         liver     Diabetes Brother      Diabetes Sister      Breast Cancer Sister 50     Hypertension Sister      Cancer Sister 72        non-hodgkins lymphoma     Cerebrovascular Disease Maternal Grandmother      Diabetes Paternal Grandfather      Diabetes Daughter         type 2     Connective Tissue Disorder Other         2 cousins and niece with MS; aunt with lupus     Rheumatoid Arthritis Cousin      Diabetes Niece      Hyperlipidemia Sister      Other Cancer Brother         Liver     Diabetes Brother      Other Cancer Sister         non-hodgkins lymphoma     Thyroid Disease Sister      Diabetes Sister      Hypertension Sister      Osteoporosis Sister      Cerebrovascular Disease Sister        Physical Exam     Temp Readings from Last 3 Encounters:   11/24/21 97.1  F (36.2  C) (Tympanic)   11/09/21 98.4  F (36.9  C) (Tympanic)   07/01/21 97.8  F (36.6  C) (Oral)     BP Readings from Last 5 Encounters:   06/02/22 (!) 143/85   12/17/21 (!) 140/85   11/24/21 (!) 153/83   11/09/21 130/84   07/01/21 131/84     Pulse  "Readings from Last 1 Encounters:   06/02/22 73     Resp Readings from Last 1 Encounters:   12/17/21 16     Estimated body mass index is 39.05 kg/m  as calculated from the following:    Height as of 11/9/21: 1.631 m (5' 4.21\").    Weight as of this encounter: 103.9 kg (229 lb).    GEN: NAD. Healthy appearing adult.   HEENT: Dry mucous membranes.  Anicteric, noninjected sclera. No obvious external lesions of the ear and nose. Hearing intact.  CV: S1, S2. RRR. No m/r/g  PULM: No increased work of breathing. CTA bilaterally   MSK: MCPs, PIPs, DIPs without swelling or tenderness to palpation.  Wrists without swelling or tenderness to palpation.      SKIN: No rash or jaundice seen  PSYCH: Alert. Appropriate.        Labs / Imaging (select studies)     ZELDA by EIA positive in 2009    CBC  Recent Labs   Lab Test 02/19/19  0951 01/17/18  1024   WBC 7.5 7.9   RBC 4.38 4.17   HGB 12.8 12.0   HCT 39.9 38.4   MCV 91 92   RDW 13.7 13.7    288   MCH 29.2 28.8   MCHC 32.1 31.3*   NEUTROPHIL 50.9  --    LYMPH 35.3  --    MONOCYTE 9.5  --    EOSINOPHIL 3.8  --    BASOPHIL 0.5  --    ANEU 3.8  --    ALYM 2.7  --    MATHEUS 0.7  --    AEOS 0.3  --    ABAS 0.0  --      CMP  Recent Labs   Lab Test 12/22/21  0917 06/03/21  1328 12/21/20  0843 10/08/19  1003 02/19/19  0951 08/14/17  0953 05/28/15  0831   NA  --   --   --   --  141  --   --    POTASSIUM  --   --   --   --  4.4  --   --    CHLORIDE  --   --   --   --  107  --   --    CO2  --   --   --   --  27  --   --    ANIONGAP  --   --   --   --  7  --   --    *  --  99 106* 99 99 94   BUN  --   --   --   --  17  --   --    CR  --  0.75  --   --  0.64  --   --    GFRESTIMATED  --  81  --   --  >90  --   --    GFRESTBLACK  --  >90  --   --  >90  --   --    VINNY  --  9.4  --   --  9.6  --   --    BILITOTAL  --   --   --   --  0.3  --   --    ALBUMIN  --   --   --   --  3.6  --   --    PROTTOTAL  --   --   --   --  7.1  --   --    ALKPHOS  --   --   --   --  103  --   --    AST  --  "  --   --   --  21  --   --    ALT  --   --   --   --  28  --   --      Calcium/VitaminD  Recent Labs   Lab Test 06/03/21  1328 02/19/19  0951   VINNY 9.4 9.6   VITDT 35  --      TSH/T4  Recent Labs   Lab Test 12/22/21  0917 08/14/17  0953   TSH 2.68 2.62     Hepatitis C  Recent Labs   Lab Test 05/18/16  0955   HCVAB Nonreactive   Assay performance characteristics have not been established for newborns,   infants, and children       Lyme ab screening  Recent Labs   Lab Test 05/18/16  0955   LYMEGM 0.06     UA  Recent Labs   Lab Test 02/19/19  1003   COLOR Yellow   APPEARANCE Clear   URINEGLC Negative   URINEBILI Negative   SG 1.020   URINEPH 6.0   PROTEIN Negative   UROBILINOGEN 0.2   NITRITE Negative   UBLD Negative   LEUKEST Negative     Urine Protein  Recent Labs   Lab Test 02/19/19  1003   UTP 0.05   UTPG 0.06   UCRR 86       Immunization History     Immunization History   Administered Date(s) Administered     COVID-19,PF,[place] 03/10/2021     COVID-19,PF,Moderna 11/12/2021     Influenza (intradermal) 11/28/2011     Pneumo Conj 13-V (2010&after) 01/11/2018     Pneumococcal 23 valent 10/04/2018     TD (ADULT, 7+) 08/01/2005     TDAP Vaccine (Adacel) 04/18/2012          Chart documentation done in part with Dragon Voice recognition Software. Although reviewed after completion, some word and grammatical error may remain.    Finesse Saunders MD

## 2022-06-02 NOTE — NURSING NOTE
Blood pressure rechecked after visit    126/80  Anne Costello CMA Rheumatology  6/2/2022                                 RAPID3 (0-30) Cumulative Score  2.3          RAPID3 Weighted Score (divide #4 by 3 and that is the weighted score)  0.7

## 2022-06-03 ENCOUNTER — TELEPHONE (OUTPATIENT)
Dept: RHEUMATOLOGY | Facility: CLINIC | Age: 72
End: 2022-06-03
Payer: COMMERCIAL

## 2022-06-03 LAB — DEPRECATED CALCIDIOL+CALCIFEROL SERPL-MC: 44 UG/L (ref 20–75)

## 2022-06-03 NOTE — TELEPHONE ENCOUNTER
Writer attempted to reach the patient and schedule lab/Reclast infusion.    Writer gucci.    [facility]  Cancer/Infusion Center   [phone]

## 2022-06-09 ENCOUNTER — ALLIED HEALTH/NURSE VISIT (OUTPATIENT)
Dept: FAMILY MEDICINE | Facility: CLINIC | Age: 72
End: 2022-06-09
Payer: COMMERCIAL

## 2022-06-09 DIAGNOSIS — Z23 NEED FOR VACCINATION: Primary | ICD-10-CM

## 2022-06-09 PROCEDURE — 90471 IMMUNIZATION ADMIN: CPT

## 2022-06-09 PROCEDURE — 99207 PR NO CHARGE NURSE ONLY: CPT

## 2022-06-09 PROCEDURE — 90715 TDAP VACCINE 7 YRS/> IM: CPT

## 2022-08-22 ENCOUNTER — LAB (OUTPATIENT)
Dept: LAB | Facility: CLINIC | Age: 72
End: 2022-08-22
Payer: COMMERCIAL

## 2022-08-22 ENCOUNTER — INFUSION THERAPY VISIT (OUTPATIENT)
Dept: INFUSION THERAPY | Facility: CLINIC | Age: 72
End: 2022-08-22
Payer: COMMERCIAL

## 2022-08-22 VITALS
HEART RATE: 72 BPM | SYSTOLIC BLOOD PRESSURE: 126 MMHG | TEMPERATURE: 97.7 F | DIASTOLIC BLOOD PRESSURE: 81 MMHG | BODY MASS INDEX: 38.15 KG/M2 | HEIGHT: 65 IN | RESPIRATION RATE: 18 BRPM | WEIGHT: 229 LBS | OXYGEN SATURATION: 97 %

## 2022-08-22 DIAGNOSIS — M80.00XD AGE-RELATED OSTEOPOROSIS WITH CURRENT PATHOLOGICAL FRACTURE WITH ROUTINE HEALING: Primary | ICD-10-CM

## 2022-08-22 LAB
CALCIUM SERPL-MCNC: 9.4 MG/DL (ref 8.5–10.1)
CREAT SERPL-MCNC: 0.65 MG/DL (ref 0.52–1.04)
GFR SERPL CREATININE-BSD FRML MDRD: >90 ML/MIN/1.73M2
HOLD SPECIMEN: NORMAL

## 2022-08-22 PROCEDURE — 82310 ASSAY OF CALCIUM: CPT | Performed by: INTERNAL MEDICINE

## 2022-08-22 PROCEDURE — 36415 COLL VENOUS BLD VENIPUNCTURE: CPT | Performed by: INTERNAL MEDICINE

## 2022-08-22 PROCEDURE — 96365 THER/PROPH/DIAG IV INF INIT: CPT | Performed by: NURSE PRACTITIONER

## 2022-08-22 PROCEDURE — 82565 ASSAY OF CREATININE: CPT | Performed by: INTERNAL MEDICINE

## 2022-08-22 PROCEDURE — 99207 PR NO CHARGE LOS: CPT

## 2022-08-22 RX ORDER — DIPHENHYDRAMINE HYDROCHLORIDE 50 MG/ML
50 INJECTION INTRAMUSCULAR; INTRAVENOUS
Status: CANCELLED
Start: 2022-08-22

## 2022-08-22 RX ORDER — MEPERIDINE HYDROCHLORIDE 25 MG/ML
25 INJECTION INTRAMUSCULAR; INTRAVENOUS; SUBCUTANEOUS EVERY 30 MIN PRN
Status: CANCELLED | OUTPATIENT
Start: 2022-08-22

## 2022-08-22 RX ORDER — EPINEPHRINE 1 MG/ML
0.3 INJECTION, SOLUTION, CONCENTRATE INTRAVENOUS EVERY 5 MIN PRN
Status: CANCELLED | OUTPATIENT
Start: 2022-08-22

## 2022-08-22 RX ORDER — HEPARIN SODIUM (PORCINE) LOCK FLUSH IV SOLN 100 UNIT/ML 100 UNIT/ML
5 SOLUTION INTRAVENOUS
Status: CANCELLED | OUTPATIENT
Start: 2022-08-22

## 2022-08-22 RX ORDER — NALOXONE HYDROCHLORIDE 0.4 MG/ML
0.2 INJECTION, SOLUTION INTRAMUSCULAR; INTRAVENOUS; SUBCUTANEOUS
Status: CANCELLED | OUTPATIENT
Start: 2022-08-22

## 2022-08-22 RX ORDER — ALBUTEROL SULFATE 90 UG/1
1-2 AEROSOL, METERED RESPIRATORY (INHALATION)
Status: CANCELLED
Start: 2022-08-22

## 2022-08-22 RX ORDER — ZOLEDRONIC ACID 5 MG/100ML
5 INJECTION, SOLUTION INTRAVENOUS ONCE
Status: CANCELLED
Start: 2022-08-22 | End: 2022-08-22

## 2022-08-22 RX ORDER — ALBUTEROL SULFATE 0.83 MG/ML
2.5 SOLUTION RESPIRATORY (INHALATION)
Status: CANCELLED | OUTPATIENT
Start: 2022-08-22

## 2022-08-22 RX ORDER — EPINEPHRINE 1 MG/ML
0.3 INJECTION, SOLUTION INTRAMUSCULAR; SUBCUTANEOUS EVERY 5 MIN PRN
Status: CANCELLED | OUTPATIENT
Start: 2022-08-22

## 2022-08-22 RX ORDER — ZOLEDRONIC ACID 5 MG/100ML
5 INJECTION, SOLUTION INTRAVENOUS ONCE
Status: COMPLETED | OUTPATIENT
Start: 2022-08-22 | End: 2022-08-22

## 2022-08-22 RX ORDER — HEPARIN SODIUM,PORCINE 10 UNIT/ML
5 VIAL (ML) INTRAVENOUS
Status: CANCELLED | OUTPATIENT
Start: 2022-08-22

## 2022-08-22 RX ORDER — METHYLPREDNISOLONE SODIUM SUCCINATE 125 MG/2ML
125 INJECTION, POWDER, LYOPHILIZED, FOR SOLUTION INTRAMUSCULAR; INTRAVENOUS
Status: CANCELLED
Start: 2022-08-22

## 2022-08-22 RX ADMIN — ZOLEDRONIC ACID 5 MG: 0.05 INJECTION, SOLUTION INTRAVENOUS at 11:19

## 2022-08-22 RX ADMIN — Medication 250 ML: at 11:16

## 2022-08-22 ASSESSMENT — PAIN SCALES - GENERAL: PAINLEVEL: NO PAIN (0)

## 2022-08-22 NOTE — PROGRESS NOTES
Infusion Nursing Note:  Karis Youssef presents today for Reclast.    Patient seen by provider today: No   present during visit today: Not Applicable.    Note: Patient reports feeling a little anxious about infusion due to last time she had some Nausea and Vomiting 24-48 hours post infusion. Eventually went away and MD was made aware. States she tolerated the infusion ok with no concerns.    Intravenous Access:  Peripheral IV placed.    Treatment Conditions:  Calcium: 9.4  Creatinine: 0.65    Post Infusion Assessment:  Patient tolerated infusion without incident.  Site patent and intact, free from redness, edema or discomfort.  No evidence of extravasations.  Access discontinued per protocol.     Discharge Plan:   AVS to patient via MYCHART.  Patient will return in 1 year for next appointment.   Patient discharged in stable condition accompanied by: self.  Departure Mode: Ambulatory.      Sona Pendleton RN

## 2022-09-22 ENCOUNTER — IMMUNIZATION (OUTPATIENT)
Dept: FAMILY MEDICINE | Facility: CLINIC | Age: 72
End: 2022-09-22
Payer: COMMERCIAL

## 2022-09-22 DIAGNOSIS — Z23 HIGH PRIORITY FOR 2019-NCOV VACCINE: Primary | ICD-10-CM

## 2022-09-22 PROCEDURE — 91312 COVID-19,PF,PFIZER BOOSTER BIVALENT: CPT

## 2022-09-22 PROCEDURE — 99207 PR NO CHARGE NURSE ONLY: CPT

## 2022-09-22 PROCEDURE — 0124A COVID-19,PF,PFIZER BOOSTER BIVALENT: CPT

## 2022-10-09 ENCOUNTER — HEALTH MAINTENANCE LETTER (OUTPATIENT)
Age: 72
End: 2022-10-09

## 2022-11-30 ENCOUNTER — TELEPHONE (OUTPATIENT)
Dept: FAMILY MEDICINE | Facility: CLINIC | Age: 72
End: 2022-11-30

## 2022-11-30 NOTE — TELEPHONE ENCOUNTER
Patient Quality Outreach    Patient is due for the following:   Colon Cancer Screening  Physical Annual Wellness Visit      Topic Date Due     Zoster (Shingles) Vaccine (1 of 2) Never done     Flu Vaccine (1) 09/01/2022       Next Steps:   Schedule a Annual Wellness Visit    Type of outreach:    Sent [employer] message.     Noted after sending [employer] message patient is scheduled with SAIGE Velez-FNP-BC  On December 8, 2022 for her annual wellness visit.     Questions for provider review:    None     Rita Martin MA

## 2023-01-09 ENCOUNTER — OFFICE VISIT (OUTPATIENT)
Dept: DERMATOLOGY | Facility: CLINIC | Age: 73
End: 2023-01-09
Payer: COMMERCIAL

## 2023-01-09 DIAGNOSIS — L57.0 ACTINIC KERATOSES: Primary | ICD-10-CM

## 2023-01-09 DIAGNOSIS — D22.9 MULTIPLE BENIGN NEVI: ICD-10-CM

## 2023-01-09 DIAGNOSIS — D18.01 CHERRY ANGIOMA: ICD-10-CM

## 2023-01-09 DIAGNOSIS — L81.4 SOLAR LENTIGO: ICD-10-CM

## 2023-01-09 DIAGNOSIS — L82.1 SEBORRHEIC KERATOSES: ICD-10-CM

## 2023-01-09 PROCEDURE — 99213 OFFICE O/P EST LOW 20 MIN: CPT | Mod: 25 | Performed by: DERMATOLOGY

## 2023-01-09 PROCEDURE — 17003 DESTRUCT PREMALG LES 2-14: CPT | Performed by: DERMATOLOGY

## 2023-01-09 PROCEDURE — 17000 DESTRUCT PREMALG LESION: CPT | Performed by: DERMATOLOGY

## 2023-01-09 RX ORDER — ZOLEDRONIC ACID 5 MG/100ML
INJECTION, SOLUTION INTRAVENOUS
COMMUNITY
Start: 2021-07-01

## 2023-01-09 NOTE — LETTER
1/9/2023         RE: Karis Youssef  [address]  [place] [address]        Dear Colleague,    Thank you for referring your patient, Karis Youssef, to the [facility]. Please see a copy of my visit note below.    [facility] Dermatology Note  Encounter Date: Jan 9, 2023  Office Visit     Dermatology Problem List:  Last skin check 1/9/23, recommended yearly  1. AKs, s/p cryo  - AK - right upper cutaneous lip, s/p bx 2/8/21  2. History of Sjogrens. ZELDA+, Ro+.    3. Acne rosacea  - Metronidazole 0.75% gel BID  4. Telogen effluvium  - Discussed rogaine, patient will consider  ____________________________________________     Assessment & Plan:    # Actinic keratosis - forehead x 1, right cheek x 1, right nasal sidewall x 1, right nasal tip x 1, left nasal sidewall x 1, left nasal tip x 1, left cheek x 1, nasal bridge x 1. Based on the location and chronic irritation to this lesion, treatment with cryotherapy is warranted.   - See cryo procedure note.      # Benign lesions: Multiple benign nevi, solar lentigos, seborrheic keratoses, cherry angiomas. Explained to patient benign nature of lesion. No treatment is necessary at this time unless the lesion changes or becomes symptomatic.   - ABCDEs of melanoma were discussed and self skin checks were advised.  - Sun precaution was advised including the use of sun screens of SPF 30 or higher, sun protective clothing, and avoidance of tanning beds.    Procedures Performed:   - Cryotherapy procedure note, location(s): forehead, right cheek, right nasal sidewall, right nasal tip, left nasal sidewall, left nasal tip, left cheek, nasal bridge. After verbal consent and discussion of risks and benefits including, but not limited to, dyspigmentation/scar, blister, and pain, 8 AK(s) was(were) treated with 1-2 mm freeze border for 1-2 cycles with liquid nitrogen. Post cryotherapy instructions were provided.    Follow-up: 1  year(s) in-person for a skin check, or earlier for new or changing lesions    Staff and Scribe:     Scribe Disclosure:   I, Omid Martin, am serving as a scribe to document services personally performed by this physician, Dr. Adelfo Berrios, based on data collection and the provider's statements to me.     Provider Disclosure:   The documentation recorded by the scribe accurately reflects the services I personally performed and the decisions made by me.    Adelfo Berrios MD    Department of Dermatology  [facility]: Phone: [phone], Fax:[phone]  [facility]: Phone: [phone] Fax: [phone]  ____________________________________________    CC: Skin Check (Full body skin check. Spot on right side of nose. No personal history of skin cancer- history of AK.)    HPI:  Ms. Karis Youssef is a(n) 72 year old female who presents today as a return patient for a skin check.    Last seen 1/3/22 for a skin check. At that time, patient was instructed to start metronidazole 0.75% gel twice daily as needed for acne rosacea. She was also recommended to start minoxidil 5% foam nightly for hair loss, and recommended urea cream and salicylic acid corn pads to the corns on the feet.     Today, she is concerned about a spot on the right side of the nose. She notes it is likely the same as her previously treated AKs.    The patient otherwise denies any new or concerning lesions. No bleeding, painful, pruritic, or changing lesions. Health otherwise stable. No other skin concerns. They do use sunscreen and protective clothing when outdoors for sun protection.       Labs Reviewed:  N/A    Physical Exam:  Vitals: There were no vitals taken for this visit.  SKIN: Full skin, which includes the head/face, both arms, chest, back, abdomen,both legs, genitalia and/or groin buttocks, digits and/or nails, was  examined.  - There are dome shaped bright red papules on the trunk and extremities.   - Multiple regular brown pigmented macules and papules are identified on the trunk and extremities.   - Scattered brown macules on sun exposed areas.  - There are waxy stuck on tan to brown papules on the trunk and extremities.    - Toenails are painted.   - There are erythematous macules with overyling adherent scale on the forehead x 1, right cheek x 1, right nasal sidewall x 1, right nasal tip x 1, left nasal sidewall x 1, left nasal tip x 1, left cheek x 1, nasal bridge x 1.   - No other lesions of concern on areas examined.     Medications:  Current Outpatient Medications   Medication     Calcium Carbonate-Vitamin D (CALCIUM 600 + D OR)     carboxymethylcellulose (CELLUVISC/REFRESH LIQUIGEL) 1 % ophthalmic solution     cetirizine (ZYRTEC) 10 MG tablet     diclofenac (VOLTAREN) 1 % topical gel     MAGNESIUM PO     Multiple Vitamins-Minerals (ICAPS AREDS 2 PO)     Polypodium Leucotomos (HELIOCARE) 240 MG CAPS     SUDAFED 30 MG OR TABS     Vitamin D, Cholecalciferol, 25 MCG (1000 UT) TABS     zoledronic Acid (RECLAST) 5 MG/100ML SOLN infusion     metroNIDAZOLE (METROGEL) 0.75 % external gel     VITAMIN C 500 MG OR TABS     VITAMIN E 400 UNIT PO CAPS     No current facility-administered medications for this visit.      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     Choroidal nevus, left     Acute pain of left shoulder     Displaced fracture of neck of right radius     Compression fracture of T9 vertebra, initial encounter (H)     Age-related osteoporosis with current pathological fracture with routine healing     Past Medical History:   Diagnosis Date     Abnormal Pap smear  1995    had colpo 1995-hyperplasia, nl since     Arthritis      Chronic obstructive pulmonary disease (H)      Chronic obstructive pulmonary disease, unspecified COPD type (H)      Disturbance of skin sensation      Osteopenia      Osteopenia      Seasonal allergies      Sjogren's syndrome (H)             Again, thank you for allowing me to participate in the care of your patient.        Sincerely,        Adelfo Berrios MD

## 2023-01-09 NOTE — PROGRESS NOTES
[facility] Dermatology Note  Encounter Date: Jan 9, 2023  Office Visit     Dermatology Problem List:  Last skin check 1/9/23, recommended yearly  1. AKs, s/p cryo  - AK - right upper cutaneous lip, s/p bx 2/8/21  2. History of Sjogrens. ZELDA+, Ro+.    3. Acne rosacea  - Metronidazole 0.75% gel BID  4. Telogen effluvium  - Discussed rogaine, patient will consider  ____________________________________________     Assessment & Plan:    # Actinic keratosis - forehead x 1, right cheek x 1, right nasal sidewall x 1, right nasal tip x 1, left nasal sidewall x 1, left nasal tip x 1, left cheek x 1, nasal bridge x 1. Based on the location and chronic irritation to this lesion, treatment with cryotherapy is warranted.   - See cryo procedure note.      # Benign lesions: Multiple benign nevi, solar lentigos, seborrheic keratoses, cherry angiomas. Explained to patient benign nature of lesion. No treatment is necessary at this time unless the lesion changes or becomes symptomatic.   - ABCDEs of melanoma were discussed and self skin checks were advised.  - Sun precaution was advised including the use of sun screens of SPF 30 or higher, sun protective clothing, and avoidance of tanning beds.    Procedures Performed:   - Cryotherapy procedure note, location(s): forehead, right cheek, right nasal sidewall, right nasal tip, left nasal sidewall, left nasal tip, left cheek, nasal bridge. After verbal consent and discussion of risks and benefits including, but not limited to, dyspigmentation/scar, blister, and pain, 8 AK(s) was(were) treated with 1-2 mm freeze border for 1-2 cycles with liquid nitrogen. Post cryotherapy instructions were provided.    Follow-up: 1 year(s) in-person for a skin check, or earlier for new or changing lesions    Staff and Scribe:     Scribe Disclosure:   Omid DONNELLY, am serving as a scribe to document services personally performed by this physician, Dr. Adelfo Berrios, based on data  collection and the provider's statements to me.     Provider Disclosure:   The documentation recorded by the scribe accurately reflects the services I personally performed and the decisions made by me.    Adelfo Berrios MD    Department of Dermatology  [facility]: Phone: [phone], Fax:[phone]  [facility]: Phone: [phone] Fax: [phone]  ____________________________________________    CC: Skin Check (Full body skin check. Spot on right side of nose. No personal history of skin cancer- history of AK.)    HPI:  Ms. Karis Youssef is a(n) 72 year old female who presents today as a return patient for a skin check.    Last seen 1/3/22 for a skin check. At that time, patient was instructed to start metronidazole 0.75% gel twice daily as needed for acne rosacea. She was also recommended to start minoxidil 5% foam nightly for hair loss, and recommended urea cream and salicylic acid corn pads to the corns on the feet.     Today, she is concerned about a spot on the right side of the nose. She notes it is likely the same as her previously treated AKs.    The patient otherwise denies any new or concerning lesions. No bleeding, painful, pruritic, or changing lesions. Health otherwise stable. No other skin concerns. They do use sunscreen and protective clothing when outdoors for sun protection.       Labs Reviewed:  N/A    Physical Exam:  Vitals: There were no vitals taken for this visit.  SKIN: Full skin, which includes the head/face, both arms, chest, back, abdomen,both legs, genitalia and/or groin buttocks, digits and/or nails, was examined.  - There are dome shaped bright red papules on the trunk and extremities.   - Multiple regular brown pigmented macules and papules are identified on the trunk and extremities.   - Scattered brown macules on sun exposed areas.  - There are waxy stuck  on tan to brown papules on the trunk and extremities.    - Toenails are painted.   - There are erythematous macules with overyling adherent scale on the forehead x 1, right cheek x 1, right nasal sidewall x 1, right nasal tip x 1, left nasal sidewall x 1, left nasal tip x 1, left cheek x 1, nasal bridge x 1.   - No other lesions of concern on areas examined.     Medications:  Current Outpatient Medications   Medication     Calcium Carbonate-Vitamin D (CALCIUM 600 + D OR)     carboxymethylcellulose (CELLUVISC/REFRESH LIQUIGEL) 1 % ophthalmic solution     cetirizine (ZYRTEC) 10 MG tablet     diclofenac (VOLTAREN) 1 % topical gel     MAGNESIUM PO     Multiple Vitamins-Minerals (ICAPS AREDS 2 PO)     Polypodium Leucotomos (HELIOCARE) 240 MG CAPS     SUDAFED 30 MG OR TABS     Vitamin D, Cholecalciferol, 25 MCG (1000 UT) TABS     zoledronic Acid (RECLAST) 5 MG/100ML SOLN infusion     metroNIDAZOLE (METROGEL) 0.75 % external gel     VITAMIN C 500 MG OR TABS     VITAMIN E 400 UNIT PO CAPS     No current facility-administered medications for this visit.      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     Choroidal nevus, left     Acute pain of left shoulder     Displaced fracture of neck of right radius     Compression fracture of T9 vertebra, initial encounter (H)     Age-related osteoporosis with current pathological fracture with routine healing     Past Medical History:   Diagnosis Date     Abnormal Pap smear 1995    had colpo 1995-hyperplasia, nl since     Arthritis      Chronic obstructive pulmonary disease (H)      Chronic obstructive pulmonary disease, unspecified COPD type (H)      Disturbance of skin sensation      Osteopenia      Osteopenia      Seasonal  allergies      Sjogren's syndrome (H)

## 2023-01-09 NOTE — PATIENT INSTRUCTIONS
Cryotherapy    What is it?  Use of a very cold liquid, such as liquid nitrogen, to freeze and destroy abnormal skin cells that need to be removed    What should I expect?  Tenderness and redness  A small blister that might grow and fill with dark purple blood. There may be crusting.  More than one treatment may be needed if the lesions do not go away.    How do I care for the treated area?  Gently wash the area with your hands when bathing.  Use a thin layer of Vaseline to help with healing. You may use a Band-Aid.   The area should heal within 7-10 days and may leave behind a pink or lighter color.   Do not use an antibiotic or Neosporin ointment.   You may take acetaminophen (Tylenol) for pain.     Call your doctor if you have:  Severe pain  Signs of infection (warmth, redness, cloudy yellow drainage, and or a bad smell)  Questions or concerns    Who should I call with questions?      [facility]: [phone]      [facility]: [phone]      For urgent needs outside of business hours call the [facility] at [phone] and ask for the dermatology resident on call           Patient Education     Checking for Skin Cancer  You can find cancer early by checking your skin each month. There are 3 kinds of skin cancer. They are melanoma, basal cell carcinoma, and squamous cell carcinoma. Doing monthly skin checks is the best way to find new marks or skin changes. Follow the instructions below for checking your skin.   The ABCDEs of checking moles for melanoma   Check your moles or growths for signs of melanoma using ABCDE:   Asymmetry: the sides of the mole or growth don t match  Border: the edges are ragged, notched, or blurred  Color: the color within the mole or growth varies  Diameter: the mole or growth is larger than 6 mm (size of a pencil eraser)  Evolving: the size, shape, or color of the mole or growth is changing (evolving is not shown in  the images below)    Checking for other types of skin cancer  Basal cell carcinoma or squamous cell carcinoma have symptoms such as:     A spot or mole that looks different from all other marks on your skin  Changes in how an area feels, such as itching, tenderness, or pain  Changes in the skin's surface, such as oozing, bleeding, or scaliness  A sore that does not heal  New swelling or redness beyond the border of a mole    Who s at risk?  Anyone can get skin cancer. But you are at greater risk if you have:   Fair skin, light-colored hair, or light-colored eyes  Many moles or abnormal moles on your skin  A history of sunburns from sunlight or tanning beds  A family history of skin cancer  A history of exposure to radiation or chemicals  A weakened immune system  If you have had skin cancer in the past, you are at risk for recurring skin cancer.   How to check your skin  Do your monthly skin checkups in front of a full-length mirror. Check all parts of your body, including your:   Head (ears, face, neck, and scalp)  Torso (front, back, and sides)  Arms (tops, undersides, upper, and lower armpits)  Hands (palms, backs, and fingers, including under the nails)  Buttocks and genitals  Legs (front, back, and sides)  Feet (tops, soles, toes, including under the nails, and between toes)  If you have a lot of moles, take digital photos of them each month. Make sure to take photos both up close and from a distance. These can help you see if any moles change over time.   Most skin changes are not cancer. But if you see any changes in your skin, call your doctor right away. Only he or she can diagnose a problem. If you have skin cancer, seeing your doctor can be the first step toward getting the treatment that could save your life.   [employer] last reviewed this educational content on 4/1/2019 2000-2020 [employer]. [address] [place] [address], [place] [address]. All rights reserved. This information is not intended  as a substitute for professional medical care. Always follow your healthcare professional's instructions.       When should I call my doctor?  If you are worsening or not improving, please, contact us or seek urgent care as noted below.     Who should I call with questions (adults)?  [facility] (adult and pediatric): [phone]  [facility] (adult): [phone]  For urgent needs outside of business hours call the [facility] at [phone] and ask for the dermatology resident on call to be paged  If this is a medical emergency and you are unable to reach an ER, Call 911    Who should I call with questions (pediatric)?  [facility]- Pediatric Dermatology  Dr. Ryanne Garcia, Dr. Allyson Posadas, Dr. Anika Alvarez, STELLA Lozano, Dr. Veronica Stinson, Dr. Margot Reeder & Dr. Jeramy Ang  Non-urgent nurse triage line; [phone]- Dori and Va MARTIN Care Coordinators   [place] (/Complex ) [phone]    If you need a prescription refill, please contact your pharmacy. Refills are approved or denied by our Physicians during normal business hours, Monday through Fridays  Per office policy, refills will not be granted if you have not been seen within the past year (or sooner depending on your child's condition)    Scheduling Information:  Pediatric Appointment Scheduling and Call Center [phone]  Radiology Scheduling- [phone]  Sedation Unit Scheduling- [phone]  [facility] Scheduling- General [phone]; Pediatric Dermatology [phone]  Main  Services: [phone]  Danish: [phone]  Nepalese: [phone]  Hmong/Bermudian/Qatari: [phone]  Preadmission Nursing Department Fax Number: [phone] (Fax all pre-operative paperwork to this number)    For urgent matters arising during evenings, weekends, or holidays that cannot wait for normal business hours  please call [phone] and ask for the dermatology resident on call to be paged.

## 2023-01-09 NOTE — NURSING NOTE
Karis Youssef's goals for this visit include:   Chief Complaint   Patient presents with     Skin Check     Full body skin check. Spot on right side of nose. No personal history of skin cancer- history of AK.       She requests these members of her care team be copied on today's visit information:     PCP: Josee Carpenter    Referring Provider:  No referring provider defined for this encounter.    There were no vitals taken for this visit.    Do you need any medication refills at today's visit? No  Dbeo Leon CMA

## 2023-01-13 NOTE — PROGRESS NOTES
"SUBJECTIVE:   Karis is a 72 year old who presents for Preventive Visit.  Patient has been advised of split billing requirements and indicates understanding: Yes  Are you in the first 12 months of your Medicare coverage?  No    Healthy Habits:     In general, how would you rate your overall health?  Good    Frequency of exercise:  4-5 days/week    Duration of exercise:  30-45 minutes    Do you usually eat at least 4 servings of fruit and vegetables a day, include whole grains    & fiber and avoid regularly eating high fat or \"junk\" foods?  Yes    Taking medications regularly:  Yes    Medication side effects:  Not applicable    Ability to successfully perform activities of daily living:  No assistance needed    Home Safety:  Lack of grab bars in the bathroom    Hearing Impairment:  No hearing concerns    In the past 6 months, have you been bothered by leaking of urine? Yes    In general, how would you rate your overall mental or emotional health?  Good      PHQ-2 Total Score: 0    Additional concerns today:  No      Patient would still like to discuss the followin.) dizziness yesterday and today with standing. Took sudafed yesterday and it helped. Denies CP, headache.     Have you ever done Advance Care Planning? (For example, a Health Directive, POLST, or a discussion with a medical provider or your loved ones about your wishes): Yes, patient states has an Advance Care Planning document and will bring a copy to the clinic.       Fall risk  Fallen 2 or more times in the past year?: No  Any fall with injury in the past year?: No    Cognitive Screening   1) Repeat 3 items (Leader, Season, Table)    2) Clock draw: NORMAL  3) 3 item recall: Recalls 3 objects  Results: 3 items recalled: COGNITIVE IMPAIRMENT LESS LIKELY    Mini-CogTM Copyright JOSHUA Pabon. Licensed by the author for use in [facility]; reprinted with permission ([place]@.[email or web address]). All rights reserved.      Do you have sleep apnea, excessive " snoring or daytime drowsiness?: no    Reviewed and updated as needed this visit by clinical staff   Tobacco  Allergies  Meds  Problems  Med Hx  Surg Hx  Fam Hx          Reviewed and updated as needed this visit by Provider   Tobacco  Allergies  Meds  Problems  Med Hx  Surg Hx  Fam Hx         Social History     Tobacco Use     Smoking status: Former     Packs/day: 0.00     Years: 0.00     Pack years: 0.00     Types: Cigarettes     Quit date: 1984     Years since quittin.0     Smokeless tobacco: Never     Tobacco comments:     nonsmoking hosuehold   Substance Use Topics     Alcohol use: Yes     Comment: 1 glass of wine one/two times a week         Alcohol Use 2023   Prescreen: >3 drinks/day or >7 drinks/week? No   Prescreen: >3 drinks/day or >7 drinks/week? -       Eye exam with ophthalmology on this date: 2022 [facility] [address] [place]     Current providers sharing in care for this patient include:   Patient Care Team:  Josee Carpenter MD as PCP - General (Family Practice)  Adelfo Berrios MD as Assigned Surgical Provider  Finesse Saunders MD as Assigned Rheumatology Provider  Sabrina Matthews NP as Assigned PCP    The following health maintenance items are reviewed in Epic and correct as of today:  Health Maintenance   Topic Date Due     ZOSTER IMMUNIZATION (1 of 2) Never done     INFLUENZA VACCINE (1) 2022     COLORECTAL CANCER SCREENING  10/14/2022     MEDICARE ANNUAL WELLNESS VISIT  2022     MAMMO SCREENING  2024     ANNUAL REVIEW OF HM ORDERS  2024     FALL RISK ASSESSMENT  2024     LIPID  [id number]     ADVANCE CARE PLANNING  2028     DTAP/TDAP/TD IMMUNIZATION (4 - Td or Tdap) 2032     DEXA  2036     HEPATITIS C SCREENING  Completed     PHQ-2 (once per calendar year)  Completed     Pneumococcal Vaccine: 65+ Years  Completed     COVID-19 Vaccine  Completed     IPV IMMUNIZATION  Aged Out     MENINGITIS  IMMUNIZATION  Aged Out     Lab work is in process  Labs reviewed in EPIC  BP Readings from Last 3 Encounters:   23 118/66   22 126/81   22 126/80    Wt Readings from Last 3 Encounters:   23 94.7 kg (208 lb 12.8 oz)   22 103.9 kg (229 lb)   22 103.9 kg (229 lb)                  Patient Active Problem List   Diagnosis     Thigh pain     Osteopenia     Sjogren's syndrome (H)     CARDIOVASCULAR SCREENING; LDL GOAL LESS THAN 160     Advanced directives, counseling/discussion     Palpitations     Acute conjunctivitis     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     Choroidal nevus, left     Acute pain of left shoulder     Displaced fracture of neck of right radius     Compression fracture of T9 vertebra, initial encounter (H)     Age-related osteoporosis with current pathological fracture with routine healing     Past Surgical History:   Procedure Laterality Date     BIOPSY      right breast lumpectomy     BREAST LUMPECTOMY, RT/LT      right breast, benign     CATARACT IOL, RT/LT      bilateral     ORTHOPEDIC SURGERY  2018    right radius        Social History     Tobacco Use     Smoking status: Former     Packs/day: 0.00     Years: 0.00     Pack years: 0.00     Types: Cigarettes     Quit date: 1984     Years since quittin.0     Smokeless tobacco: Never     Tobacco comments:     nonsmoking hosuehold   Substance Use Topics     Alcohol use: Yes     Comment: 1 glass of wine one/two times a week     Family History   Problem Relation Age of Onset     C.A.D. Mother      Osteoporosis Mother      Hypertension Mother      Heart Disease Mother         CAD, CHF     Coronary Artery Disease Mother      Hyperlipidemia Mother      Diabetes Father      Hypertension Father      Cancer Brother         liver     Diabetes Brother      Diabetes Sister      Breast Cancer Sister 50     Hypertension Sister      Cancer Sister 72        non-hodgkins lymphoma     Cerebrovascular Disease Maternal  Grandmother      Diabetes Paternal Grandfather      Diabetes Daughter         type 2     Connective Tissue Disorder Other         2 cousins and niece with MS; aunt with lupus     Rheumatoid Arthritis Cousin      Diabetes Niece      Hyperlipidemia Sister      Other Cancer Brother         Liver     Diabetes Brother      Other Cancer Sister         non-hodgkins lymphoma     Thyroid Disease Sister      Diabetes Sister      Hypertension Sister      Osteoporosis Sister      Cerebrovascular Disease Sister          Current Outpatient Medications   Medication Sig Dispense Refill     Calcium Carbonate-Vitamin D (CALCIUM 600 + D OR) Take 1,200 mg by mouth daily        carboxymethylcellulose (CELLUVISC/REFRESH LIQUIGEL) 1 % ophthalmic solution Place 1 drop into both eyes 3 times daily       cetirizine (ZYRTEC) 10 MG tablet Take 10 mg by mouth daily       diclofenac (VOLTAREN) 1 % topical gel Place onto the skin 4 times daily 100 g 6     MAGNESIUM PO Take 250 mg by mouth daily        Multiple Vitamins-Minerals (ICAPS AREDS 2 PO) Take 1 tablet by mouth 2 times daily       Polypodium Leucotomos (HELIOCARE) 240 MG CAPS        SUDAFED 30 MG OR TABS 1 TABLET EVERY 4 TO 6 HOURS AS NEEDED       VITAMIN C 500 MG OR TABS        Vitamin D, Cholecalciferol, 25 MCG (1000 UT) TABS daily        VITAMIN E 400 UNIT PO CAPS        zoledronic Acid (RECLAST) 5 MG/100ML SOLN infusion        metroNIDAZOLE (METROGEL) 0.75 % external gel Apply twice daily as needed to face for rosacea. (Patient not taking: Reported on 1/9/2023) 45 g 11     Allergies   Allergen Reactions     Erythromycin Nausea     Tongue turned black.     Nsaids GI Disturbance     Gastritis        Recent Labs   Lab Test 08/22/22  1015 06/02/22  1326 12/22/21  0917 06/03/21  1328 12/21/20  0843 10/08/19  1003 02/19/19  0951 08/14/17  0953 08/14/17  0953   LDL  --   --  139*  --  140* 153*  --   --  116*   HDL  --   --  73  --  71 66  --   --  68   TRIG  --   --  114  --  84 66  --   --  " 66   ALT  --   --   --   --   --   --  28  --   --    CR 0.65 0.65  --  0.75  --   --  0.64   < >  --    GFRESTIMATED >90 >90  --  81  --   --  >90   < >  --    GFRESTBLACK  --   --   --  >90  --   --  >90  --   --    POTASSIUM  --   --   --   --   --   --  4.4  --   --    TSH  --   --  2.68  --   --   --   --   --  2.62    < > = values in this interval not displayed.      Mammogram Screening: Mammogram Screening: Recommended mammography every 1-2 years with patient discussion and risk factor consideration        Review of Systems   Constitutional: Negative for chills and fever.   HENT: Negative for congestion, ear pain, hearing loss and sore throat.    Eyes: Negative for pain and visual disturbance.   Respiratory: Negative for cough and shortness of breath.    Cardiovascular: Negative for chest pain, palpitations and peripheral edema.   Gastrointestinal: Negative for abdominal pain, constipation, diarrhea, heartburn, hematochezia and nausea.   Breasts:  Negative for tenderness, breast mass and discharge.   Genitourinary: Negative for dysuria, frequency, genital sores, hematuria, pelvic pain, urgency, vaginal bleeding and vaginal discharge.   Musculoskeletal: Negative for arthralgias, joint swelling and myalgias.   Skin: Negative for rash.   Neurological: Negative for dizziness, weakness, headaches and paresthesias.   Psychiatric/Behavioral: Negative for mood changes. The patient is not nervous/anxious.          OBJECTIVE:   /66   Pulse 90   Temp 97.3  F (36.3  C) (Tympanic)   Resp 16   Ht 1.628 m (5' 4.09\")   Wt 94.7 kg (208 lb 12.8 oz)   SpO2 97%   BMI 35.74 kg/m   Estimated body mass index is 35.74 kg/m  as calculated from the following:    Height as of this encounter: 1.628 m (5' 4.09\").    Weight as of this encounter: 94.7 kg (208 lb 12.8 oz).  Physical Exam  GENERAL APPEARANCE: healthy, alert and no distress  EYES: Eyes grossly normal to inspection, PERRL and conjunctivae and sclerae " normal  HENT: ear canals and TM's normal, nose and mouth without ulcers or lesions, oropharynx clear and oral mucous membranes moist POSITIVE for clear fluid seen behind bilateral TMs. Discussed can continue sudafed, or try meclizine, benadryl at night or flonase 1-2 times daily for a few weeks.   NECK: no adenopathy, no asymmetry, masses, or scars and thyroid normal to palpation  RESP: lungs clear to auscultation - no rales, rhonchi or wheezes  BREAST: deferred per guidelines- asymptomatic per pt  CV: regular rate and rhythm, normal S1 S2, no S3 or S4, no murmur, click or rub, no peripheral edema and peripheral pulses strong  ABDOMEN: soft, nontender, no hepatosplenomegaly, no masses and bowel sounds normal   (female): deferred per pt, no concerns  MS: no musculoskeletal defects are noted and gait is age appropriate without ataxia, no CVA tenderness  SKIN: no suspicious lesions or rashes  NEURO: Normal strength and tone, cranial nerves intact, sensory exam grossly normal, mentation intact and speech normal  PSYCH: mentation appears normal and affect normal/bright    Diagnostic Test Results:  Labs reviewed in Epic  See orders    ASSESSMENT / PLAN:       ICD-10-CM    1. Encounter for Medicare annual wellness exam  Z00.00       2. Screen for colon cancer  Z12.11 COLOGUARD(EXACT SCIENCES)      3. Lipid screening  Z13.220 Lipid panel reflex to direct LDL Fasting     Lipid panel reflex to direct LDL Fasting      4. Screening for thyroid disorder  Z13.29 TSH with free T4 reflex     TSH with free T4 reflex      5. Screening for diabetes mellitus  Z13.1 Glucose     Glucose      6. Non-recurrent acute serous otitis media of both ears  H65.03       7. Vertigo  R42           Patient has been advised of split billing requirements and indicates understanding: Yes      COUNSELING:  Reviewed preventive health counseling, as reflected in patient instructions      BMI:   Estimated body mass index is 35.74 kg/m  as calculated from  "the following:    Height as of this encounter: 1.628 m (5' 4.09\").    Weight as of this encounter: 94.7 kg (208 lb 12.8 oz).   Weight management plan: Discussed healthy diet and exercise guidelines      She reports that she quit smoking about 39 years ago. Her smoking use included cigarettes. She has never used smokeless tobacco.      Appropriate preventive services were discussed with this patient, including applicable screening as appropriate for cardiovascular disease, diabetes, osteopenia/osteoporosis, and glaucoma.  As appropriate for age/gender, discussed screening for colorectal cancer, prostate cancer, breast cancer, and cervical cancer. Checklist reviewing preventive services available has been given to the patient.    Reviewed patients plan of care and provided an AVS. The Basic Care Plan (routine screening as documented in Health Maintenance) for Karis meets the Care Plan requirement. This Care Plan has been established and reviewed with the Patient.          ABDOUL WHITING  [facility]    Identified Health Risks:  "

## 2023-01-16 ASSESSMENT — ENCOUNTER SYMPTOMS
PARESTHESIAS: 0
SHORTNESS OF BREATH: 0
CONSTIPATION: 0
MYALGIAS: 0
CHILLS: 0
HEARTBURN: 0
JOINT SWELLING: 0
DIZZINESS: 0
WEAKNESS: 0
NAUSEA: 0
HEADACHES: 0
PALPITATIONS: 0
DYSURIA: 0
ABDOMINAL PAIN: 0
NERVOUS/ANXIOUS: 0
FEVER: 0
HEMATOCHEZIA: 0
FREQUENCY: 0
SORE THROAT: 0
HEMATURIA: 0
BREAST MASS: 0
COUGH: 0
ARTHRALGIAS: 0
EYE PAIN: 0
DIARRHEA: 0

## 2023-01-16 ASSESSMENT — ACTIVITIES OF DAILY LIVING (ADL): CURRENT_FUNCTION: NO ASSISTANCE NEEDED

## 2023-01-17 ENCOUNTER — OFFICE VISIT (OUTPATIENT)
Dept: FAMILY MEDICINE | Facility: CLINIC | Age: 73
End: 2023-01-17
Payer: COMMERCIAL

## 2023-01-17 ENCOUNTER — LAB (OUTPATIENT)
Dept: FAMILY MEDICINE | Facility: CLINIC | Age: 73
End: 2023-01-17

## 2023-01-17 VITALS
HEART RATE: 90 BPM | WEIGHT: 208.8 LBS | SYSTOLIC BLOOD PRESSURE: 118 MMHG | BODY MASS INDEX: 35.65 KG/M2 | RESPIRATION RATE: 16 BRPM | DIASTOLIC BLOOD PRESSURE: 66 MMHG | HEIGHT: 64 IN | OXYGEN SATURATION: 97 % | TEMPERATURE: 97.3 F

## 2023-01-17 DIAGNOSIS — Z13.1 SCREENING FOR DIABETES MELLITUS: ICD-10-CM

## 2023-01-17 DIAGNOSIS — Z12.11 SCREEN FOR COLON CANCER: ICD-10-CM

## 2023-01-17 DIAGNOSIS — Z00.00 ENCOUNTER FOR MEDICARE ANNUAL WELLNESS EXAM: Primary | ICD-10-CM

## 2023-01-17 DIAGNOSIS — R42 VERTIGO: ICD-10-CM

## 2023-01-17 DIAGNOSIS — Z13.220 LIPID SCREENING: ICD-10-CM

## 2023-01-17 DIAGNOSIS — Z13.29 SCREENING FOR THYROID DISORDER: ICD-10-CM

## 2023-01-17 DIAGNOSIS — H65.03 NON-RECURRENT ACUTE SEROUS OTITIS MEDIA OF BOTH EARS: ICD-10-CM

## 2023-01-17 LAB
CHOLEST SERPL-MCNC: 210 MG/DL
FASTING STATUS PATIENT QL REPORTED: YES
FASTING STATUS PATIENT QL REPORTED: YES
GLUCOSE BLD-MCNC: 107 MG/DL (ref 70–99)
HDLC SERPL-MCNC: 68 MG/DL
LDLC SERPL CALC-MCNC: 124 MG/DL
NONHDLC SERPL-MCNC: 142 MG/DL
T4 FREE SERPL-MCNC: 1.05 NG/DL (ref 0.76–1.46)
TRIGL SERPL-MCNC: 90 MG/DL
TSH SERPL DL<=0.005 MIU/L-ACNC: 4.68 MU/L (ref 0.4–4)

## 2023-01-17 PROCEDURE — 84443 ASSAY THYROID STIM HORMONE: CPT | Performed by: NURSE PRACTITIONER

## 2023-01-17 PROCEDURE — 84439 ASSAY OF FREE THYROXINE: CPT | Performed by: NURSE PRACTITIONER

## 2023-01-17 PROCEDURE — 36415 COLL VENOUS BLD VENIPUNCTURE: CPT | Performed by: NURSE PRACTITIONER

## 2023-01-17 PROCEDURE — 80061 LIPID PANEL: CPT | Performed by: NURSE PRACTITIONER

## 2023-01-17 PROCEDURE — G0439 PPPS, SUBSEQ VISIT: HCPCS | Performed by: NURSE PRACTITIONER

## 2023-01-17 PROCEDURE — 99213 OFFICE O/P EST LOW 20 MIN: CPT | Mod: 25 | Performed by: NURSE PRACTITIONER

## 2023-01-17 PROCEDURE — 82947 ASSAY GLUCOSE BLOOD QUANT: CPT | Performed by: NURSE PRACTITIONER

## 2023-01-17 ASSESSMENT — ENCOUNTER SYMPTOMS
DYSURIA: 0
ABDOMINAL PAIN: 0
EYE PAIN: 0
MYALGIAS: 0
HEMATURIA: 0
BREAST MASS: 0
HEMATOCHEZIA: 0
SHORTNESS OF BREATH: 0
HEADACHES: 0
COUGH: 0
DIARRHEA: 0
CHILLS: 0
NERVOUS/ANXIOUS: 0
PARESTHESIAS: 0
DIZZINESS: 0
FREQUENCY: 0
PALPITATIONS: 0
ARTHRALGIAS: 0
CONSTIPATION: 0
WEAKNESS: 0
HEARTBURN: 0
JOINT SWELLING: 0
SORE THROAT: 0
FEVER: 0
NAUSEA: 0

## 2023-01-17 ASSESSMENT — PAIN SCALES - GENERAL: PAINLEVEL: NO PAIN (0)

## 2023-01-17 ASSESSMENT — ACTIVITIES OF DAILY LIVING (ADL): CURRENT_FUNCTION: NO ASSISTANCE NEEDED

## 2023-01-17 NOTE — PATIENT INSTRUCTIONS
Patient Education   Personalized Prevention Plan  You are due for the preventive services outlined below.  Your care team is available to assist you in scheduling these services.  If you have already completed any of these items, please share that information with your care team to update in your medical record.  Health Maintenance Due   Topic Date Due     Zoster (Shingles) Vaccine (1 of 2) Never done     Flu Vaccine (1) 09/01/2022     Colorectal Cancer Screening  10/14/2022     Annual Wellness Visit  11/09/2022       Urinary Incontinence, Female (Adult)   Urinary incontinence means loss of bladder control. This problem affects many women, especially as they get older. If you have incontinence, you may be embarrassed to ask for help. But know that this problem can be treated.   Types of Incontinence  There are different types of incontinence. Two of the main types are described here. You can have more than one type.     Stress incontinence. With this type, urine leaks when pressure (stress) is put on the bladder. This may happen when you cough, sneeze, or laugh. Stress incontinence most often occurs because the pelvic floor muscles that support the bladder and urethra are weak. This can happen after pregnancy and vaginal childbirth or a hysterectomy. It can also be due to excess body weight or hormone changes.    Urge incontinence (also called overactive bladder). With this type, a sudden urge to urinate is felt often. This may happen even though there may not be much urine in the bladder. The need to urinate often during the night is common. Urge incontinence most often occurs because of bladder spasms. This may be due to bladder irritation or infection. Damage to bladder nerves or pelvic muscles, constipation, and certain medicines can also lead to urge incontinence.  Treatment depends on the cause. Further evaluation is needed to find the type you have. This will likely include an exam and certain tests. Based  on the results, you and your healthcare provider can then plan treatment. Until a diagnosis is made, the home care tips below can help ease symptoms.   Home care    Do pelvic floor muscle exercises, if they are prescribed. The pelvic floor muscles help support the bladder and urethra. Many women find that their symptoms improve when doing special exercises that strengthen these muscles. To do the exercises, contract the muscles you would use to stop your stream of urine. But do this when you re not urinating. Hold for 10 seconds, then relax. Repeat 10 to 20 times in a row, at least 3 times a day. Your healthcare provider may give you other instructions for how to do the exercises and how often.    Keep a bladder diary. This helps track how often and how much you urinate over a set period of time. Bring this diary with you to your next visit with the provider. The information can help your provider learn more about your bladder problem.    Lose weight, if advised to by your provider. Extra weight puts pressure on the bladder. Your provider can help you create a weight-loss plan that s right for you. This may include exercising more and making certain diet changes.    Don't have foods and drinks that may irritate the bladder. These can include alcohol and caffeinated drinks.    Quit smoking. Smoking and other tobacco use can lead to a long-term (chronic) cough that strains the pelvic floor muscles. Smoking may also damage the bladder and urethra. Talk with your provider about treatments or methods you can use to quit smoking.    If drinking large amounts of fluid makes you have symptoms, you may be advised to limit your fluid intake. You may also be advised to drink most of your fluids during the day and to limit fluids at night.    If you re worried about urine leakage or accidents, you may wear absorbent pads to catch urine. Change the pads often. This helps reduce discomfort. It may also reduce the risk of skin or  bladder infections.    Follow-up care  Follow up with your healthcare provider, or as directed. It may take some to find the right treatment for your problem. But healthy lifestyle changes can be made right away. These include such things as exercising on a regular basis, eating a healthy diet, losing weight (if needed), and quitting smoking. Your treatment plan may include special therapies or medicines. Certain procedures or surgery may also be options. Talk about any questions you have with your provider.   When to seek medical advice  Call the healthcare provider right away if any of these occur:    Fever of 100.4 F (38 C) or higher, or as directed by your provider    Bladder pain or fullness    Belly swelling    Nausea or vomiting    Back pain    Weakness, dizziness, or fainting  Angel last reviewed this educational content on 1/1/2020 2000-2021 [employer]. All rights reserved. This information is not intended as a substitute for professional medical care. Always follow your healthcare professional's instructions.

## 2023-01-20 NOTE — RESULT ENCOUNTER NOTE
Richard Dyson,    Thank you for your recent office visit.    Here are your recent results.  Your fasting glucose is considered normal under 127, yours is 107.  For a nondiabetic your cholesterol is considered normal, we base the cholesterol off the bad cholesterol, the LDL.  For a nondiabetic we want this under 190, yours is 124.  Your thyroid is just slightly over normal.  We do not treat for hypothyroidism unless your TSH is greater than 10 or if the patient is significantly symptomatic for hypothyroidism as this requires medication with lab rechecks every 3 months until in range.    Feel free to contact me via "[employer] or call the clinic at [phone].    Sincerely,    JULIO CESAR Donovan, FNP-BC

## 2023-01-26 DIAGNOSIS — Z12.11 COLON CANCER SCREENING: ICD-10-CM

## 2023-01-26 DIAGNOSIS — R19.5 POSITIVE COLORECTAL CANCER SCREENING USING COLOGUARD TEST: Primary | ICD-10-CM

## 2023-01-26 LAB — NONINV COLON CA DNA+OCC BLD SCRN STL QL: POSITIVE

## 2023-01-26 NOTE — RESULT ENCOUNTER NOTE
Richard Dyson,    Thank you for your recent office visit.    Here are your recent results.  Positive cologuard test, the recommendation is to have you complete a colonoscopy so that we can ensure you do not have colon cancer.  I am placing the order for this.  They will probably start trying to contact you to help you schedule.     Feel free to contact me via [employer] or call the clinic at [phone].    Sincerely,    Sabrina Avila, JULIO CESAR, FNP-BC

## 2023-01-30 ENCOUNTER — HOSPITAL ENCOUNTER (OUTPATIENT)
Facility: AMBULATORY SURGERY CENTER | Age: 73
End: 2023-01-30
Attending: STUDENT IN AN ORGANIZED HEALTH CARE EDUCATION/TRAINING PROGRAM
Payer: COMMERCIAL

## 2023-01-30 ENCOUNTER — TELEPHONE (OUTPATIENT)
Dept: GASTROENTEROLOGY | Facility: CLINIC | Age: 73
End: 2023-01-30
Payer: COMMERCIAL

## 2023-01-30 NOTE — TELEPHONE ENCOUNTER
Screening Questions  BLUE  KIND OF PREP RED  LOCATION [review exclusion criteria] GREEN  SEDATION TYPE        Y Are you active on mychart?       Sabrina Matthews, MERCEDES   Ordering/Referring Provider?        [facility]/MEDICARE What type of coverage do you have?      N Have you had a positive covid test in the last 14 days?     34.1 1. BMI  [BMI 40+ - review exclusion criteria]    Y  2. Are you able to give consent for your medical care? [IF NO,RN REVIEW]          N  3. Are you taking any prescription pain medications on a routine schedule   (ex narcotics: tramadol, oxycodone, roxicodone, oxycontin,  and percocet)?          3a. EXTENDED PREP What kind of prescription?     N 4. Do you have any chemical dependencies such as alcohol, street drugs, or methadone?        **If yes 3- 5 , please schedule with MAC sedation.**          IF YES TO ANY 6 - 10 - HOSPITAL SETTING ONLY.     N 6.   Do you need assistance transferring?     N 7.   Have you had a heart or lung transplant?    N 8.   Are you currently on dialysis?   N 9.   Do you use daily home oxygen?   N 10. Do you take nitroglycerin?   10a.  If yes, how often?     11. [FEMALES]  N Are you currently pregnant?    11a.  If yes, how many weeks? [ Greater than 12 weeks, OR NEEDED]    N 12. Do you have Pulmonary Hypertension? *NEED PAC APPT AT [facility]*     N 13. [review exclusion criteria]  Do you have any implantable devices in your body (pacemaker, defib, LVAD)?    N 14. In the past 6 months, have you had any heart related issues including cardiomyopathy or heart attack?     14a.  If yes, did it require cardiac stenting if so when?     N 15. Have you had a stroke or Transient ischemic attack (TIA - aka  mini stroke ) within 6 months?      N 16. Do you have mod to severe Obstructive Sleep Apnea?  [Hospital only]    N 17. Do you have SEVERE AND UNCONTROLLED asthma? *NEED PAC APPT AT [facility]*     N 18. Are you currently taking any blood thinners?     18a. If yes, inform patient  "to \"follow up w/ ordering provider for bridging instructions.\"    N 19. Do you take the medication Phentermine?    19a. If yes, \"Hold for 7 days before procedure.  Please consult your prescribing provider if you have questions about holding this medication.\"     N  20. Do you have chronic kidney disease?      N  21. Do you have a diagnosis of diabetes?     N  22. On a regular basis do you go 3-5 days between bowel movements?      23. Preferred LOCAL Pharmacy for Pre Prescription    [ LIST ONLY ONE PHARMACY]        [place] - [address] [address], [address]      - CLOSING REMINDERS -    Informed patient they will need an adult    Cannot take any type of public or medical transportation alone    Conscious Sedation- Needs  for 6 hours after the procedure       MAC/General-Needs  for 24 hours after procedure    Pre-Procedure Covid test to be completed [[facility] PCR Testing Required]    Confirmed Nurse will call to complete assessment       - SCHEDULING DETAILS -  NO Hospital Setting Required? If yes, what is the exclusion?:    IRVIN  Surgeon    4/3  Date of Procedure  Lower Endoscopy [Colonoscopy]  Type of Procedure Scheduled  [facility]   STANDARD [facility]-If you answer yes to questions #8, #20, #21Which Colonoscopy Prep was Sent?     MODERATE Sedation Type     N PAC / Pre-op Required                 "

## 2023-03-10 NOTE — TELEPHONE ENCOUNTER
Refill sent will discuss at 5/24/2021 visit 0 may benefit from IR consult   [NL] : clear to auscultation bilaterally

## 2023-03-13 ENCOUNTER — TELEPHONE (OUTPATIENT)
Dept: FAMILY MEDICINE | Facility: CLINIC | Age: 73
End: 2023-03-13
Payer: COMMERCIAL

## 2023-03-13 NOTE — TELEPHONE ENCOUNTER
Nadia from [facility] physical therapy calling for gabo CUENCA referral for home care physical therapy   Did home safety evaluation, after visit patient felt she did not need any further assistance or care at this time, patient currently immobile.   Has hospital follow up this week with Dr. Sparkle Barkley and follow up with ortho in 3 weeks  No further orders are needed at this time      Will route to Dr. Sparkle Barkley who is seeing patient this week for hospital follow up as Dr. Josee Carpenter is out of the office this week    Sabrina HENSON, RN

## 2023-03-14 ENCOUNTER — TELEPHONE (OUTPATIENT)
Dept: FAMILY MEDICINE | Facility: CLINIC | Age: 73
End: 2023-03-14

## 2023-03-14 ENCOUNTER — VIRTUAL VISIT (OUTPATIENT)
Dept: FAMILY MEDICINE | Facility: CLINIC | Age: 73
End: 2023-03-14
Payer: COMMERCIAL

## 2023-03-14 DIAGNOSIS — S42.291D OTHER CLOSED DISPLACED FRACTURE OF PROXIMAL END OF RIGHT HUMERUS WITH ROUTINE HEALING, SUBSEQUENT ENCOUNTER: Primary | ICD-10-CM

## 2023-03-14 DIAGNOSIS — S82.092D OTHER CLOSED FRACTURE OF LEFT PATELLA WITH ROUTINE HEALING, SUBSEQUENT ENCOUNTER: ICD-10-CM

## 2023-03-14 PROCEDURE — 99213 OFFICE O/P EST LOW 20 MIN: CPT | Mod: VID | Performed by: FAMILY MEDICINE

## 2023-03-14 RX ORDER — OXYCODONE HYDROCHLORIDE 5 MG/1
5 TABLET ORAL
COMMUNITY
Start: 2023-03-10 | End: 2023-09-25

## 2023-03-14 RX ORDER — METHOCARBAMOL 500 MG/1
TABLET, FILM COATED ORAL
COMMUNITY
Start: 2023-03-10 | End: 2023-09-25

## 2023-03-14 NOTE — TELEPHONE ENCOUNTER
Valerie OLVERA from [facility] calling.    They received orders for physical therapy and home health aid.    Pt received care from [facility] for physical therapy.      [facility] can't just send out home health aid.  Can you change the order to [facility] for the home health aid?    Gabriella PADGETTN, RN

## 2023-03-14 NOTE — PROGRESS NOTES
Karis is a 72 year old who is being evaluated via a billable video visit.      How would you like to obtain your AVS? MyChart  If the video visit is dropped, the invitation should be resent by: Text to cell phone: [phone]  Will anyone else be joining your video visit? No        Assessment & Plan     Other closed displaced fracture of proximal end of right humerus with routine healing, subsequent encounter  Per ortho  - Home Care Referral    Other closed fracture of left patella with routine healing, subsequent encounter  Per ortho  - Home Care Referral           MED REC REQUIRED  Post Medication Reconciliation Status: discharge medications reconciled and changed, per note/orders      No follow-ups on file.    Sparkle Barkley MD  [facility] [place]    [facility]   Karis is a 72 year old, presenting for the following health issues:  Hospital F/U      [facility]       Hospital Follow-up Visit:    Hospital/Nursing Home/IP Rehab Facility: [facility]   Date of Admission: 3/7/23  Date of Discharge: 3/11/23  Reason(s) for Admission: fracture of left patella and right humerus    Was your hospitalization related to COVID-19? No   Problems taking medications regularly:  None  Medication changes since discharge: None  Problems adhering to non-medication therapy:  None    Summary of hospitalization:  CareEverywhere information obtained and reviewed  Diagnostic Tests/Treatments reviewed.  Follow up needed: with ortho  Other Healthcare Providers Involved in Patient s Care:         Physical Therapy  Update since discharge: improved. Principal Problem:  Closed displaced fracture of greater tuberosity of right humerus  Active Problems:  Closed nondisplaced fracture of left patella    BRIEF HOSPITAL COURSE: This 72 y.o. female with history of Sjogrens syndome who presents to [facility] via private vehicle for evaluation after a fall. Patient tripped over a box while shopping this afternoon and fell into  an end cap. She has immediate pain in her left leg, right arm, and left chest, but was able to walk out to her car and  herself home. Patient is not on any anticoagulants. Imaging revealed Right proximal humerus fracture, left patella fracture, and a mid thoracic compression deformity. On exam patient c/o pain to left leg, right shoulder, and left chest wall. The patient denies headache, dizziness, visual changes, neck pain, back pain, shortness of breath, abdominal pain and peripheral weakness or parasthesias. The patient was subsequently admitted to core trauma services with consults to Orthopedics.    On 3/8/2023 patient was evaluated by neurosurgery, recommended upright x rays which were stable, no acute intervention indicated. She was evaluated by orthopedic surgery, recommended non operative management with outpatient follow up to evaluate healing of humerus fx. They worked with PT and OT, recommended return to home with family support and home therapy. On 3/11/2023 they met discharge criteria. Discharged to home in stable condition with instructions to follow up with orthopedic surgery and their PCP as directed.       Pt with right proximal humerous and left patellar fracture after tripping and falling  She has seen ortho in the hospital and was non-surgical at that time  She has follow-up with them on March 30th  She is only using 1000 mg tylenol bid for pain  Right arm in splint  Left knee in splint  Has had PT and has been cleared  She is requesting home health aid to help with housework as she is not supposed to move her right arm and finds it difficult to ambulate in left knee splint    Plan of care communicated with patient             Review of Systems   Constitutional, HEENT, cardiovascular, pulmonary, gi and gu systems are negative, except as otherwise noted.      Objective           Vitals:  No vitals were obtained today due to virtual visit.    Physical Exam   GENERAL: Healthy, alert and no  distress  EYES: Eyes grossly normal to inspection.  No discharge or erythema, or obvious scleral/conjunctival abnormalities.  RESP: No audible wheeze, cough, or visible cyanosis.  No visible retractions or increased work of breathing.    SKIN: Visible skin clear. No significant rash, abnormal pigmentation or lesions.  NEURO: Cranial nerves grossly intact.  Mentation and speech appropriate for age.  PSYCH: Mentation appears normal, affect normal/bright, judgement and insight intact, normal speech and appearance well-groomed.    No results found for this or any previous visit (from the past 24 hour(s)).            Video-Visit Details    Type of service:  Video Visit   Video Start Time: 11:08  Video End Time:11:21 AM    Originating Location (pt. Location): Home  Distant Location (provider location):  On-site  Platform used for Video Visit: Keeley

## 2023-03-15 ENCOUNTER — TELEPHONE (OUTPATIENT)
Dept: GASTROENTEROLOGY | Facility: CLINIC | Age: 73
End: 2023-03-15
Payer: COMMERCIAL

## 2023-03-15 NOTE — TELEPHONE ENCOUNTER
Caller: Karis Youssef    Reason for Reschedule/Cancellation (please be detailed, any staff messages or encounters to note?): patient is recovering from broken bones      Prior to reschedule please review:    Ordering Provider:FITZ    Sedation per order:MODERATE    Does patient have any ASC Exclusions, please identify?: NO      Notes on Cancelled Procedure:    Procedure:Lower Endoscopy [Colonoscopy]     Date: 4/3    Location:[facility]; [address], [place] [address]    Surgeon: IRVIN        Rescheduled: Yes    Procedure: Lower Endoscopy [Colonoscopy]     Date: 5/12    Location:[facility]; [address], [place] [address]    Surgeon: MEREDITH    Sedation Level Scheduled  MODERATE,  Reason for Sedation Level PER ORDER/SCREENING    Prep/Instructions updated and sent: YES

## 2023-03-20 ENCOUNTER — MEDICAL CORRESPONDENCE (OUTPATIENT)
Dept: HEALTH INFORMATION MANAGEMENT | Facility: CLINIC | Age: 73
End: 2023-03-20

## 2023-04-26 ENCOUNTER — TELEPHONE (OUTPATIENT)
Dept: GASTROENTEROLOGY | Facility: CLINIC | Age: 73
End: 2023-04-26

## 2023-04-26 DIAGNOSIS — R19.5 POSITIVE COLORECTAL CANCER SCREENING USING COLOGUARD TEST: Primary | ICD-10-CM

## 2023-04-26 RX ORDER — BISACODYL 5 MG/1
TABLET, DELAYED RELEASE ORAL
Qty: 4 TABLET | Refills: 0 | Status: SHIPPED | OUTPATIENT
Start: 2023-04-26 | End: 2023-09-25

## 2023-04-26 NOTE — TELEPHONE ENCOUNTER
Pre assessment questions completed for upcoming Colonoscopy  procedure scheduled on 05.12.2023    COVID policy reviewed.     Pre-op exam? N/A    Reviewed procedural arrival time 1055, procedure time 1140 and facility location [facility]; [address], [place] [address]    Designated  policy reviewed. Instructed to have someone stay 6 hours post procedure.     Anticoagulation/blood thinners? No    Electronic implanted devices? No    Diabetic? No    Procedure indication:   Positive colorectal cancer screening using Cologuard test   Colon cancer screening         Bowel prep recommendation: Standard Golytely     Reviewed procedure prep instructions.     Prep instructions sent via [employer].  Bowel prep script sent to    [facility]  [facility] [address], [address].     Patient verbalized understanding and had no questions or concerns at this time.    Juana Camilo RN  Endoscopy Procedure Pre Assessment RN

## 2023-05-10 ENCOUNTER — TRANSFERRED RECORDS (OUTPATIENT)
Dept: HEALTH INFORMATION MANAGEMENT | Facility: CLINIC | Age: 73
End: 2023-05-10
Payer: COMMERCIAL

## 2023-05-12 ENCOUNTER — HOSPITAL ENCOUNTER (OUTPATIENT)
Facility: AMBULATORY SURGERY CENTER | Age: 73
Discharge: HOME | End: 2023-05-12
Attending: INTERNAL MEDICINE | Admitting: INTERNAL MEDICINE
Payer: COMMERCIAL

## 2023-05-12 VITALS
DIASTOLIC BLOOD PRESSURE: 63 MMHG | HEART RATE: 82 BPM | TEMPERATURE: 97.5 F | OXYGEN SATURATION: 99 % | RESPIRATION RATE: 16 BRPM | SYSTOLIC BLOOD PRESSURE: 120 MMHG

## 2023-05-12 LAB — COLONOSCOPY: NORMAL

## 2023-05-12 PROCEDURE — G8918 PT W/O PREOP ORDER IV AB PRO: HCPCS

## 2023-05-12 PROCEDURE — 45385 COLONOSCOPY W/LESION REMOVAL: CPT | Mod: PT

## 2023-05-12 PROCEDURE — 88305 TISSUE EXAM BY PATHOLOGIST: CPT | Performed by: PATHOLOGY

## 2023-05-12 PROCEDURE — 45380 COLONOSCOPY AND BIOPSY: CPT | Mod: PT,XS

## 2023-05-12 PROCEDURE — G8907 PT DOC NO EVENTS ON DISCHARG: HCPCS

## 2023-05-12 RX ORDER — ONDANSETRON 2 MG/ML
4 INJECTION INTRAMUSCULAR; INTRAVENOUS
Status: DISCONTINUED | OUTPATIENT
Start: 2023-05-12 | End: 2023-05-13 | Stop reason: HOSPADM

## 2023-05-12 RX ORDER — LIDOCAINE 40 MG/G
CREAM TOPICAL
Status: DISCONTINUED | OUTPATIENT
Start: 2023-05-12 | End: 2023-05-13 | Stop reason: HOSPADM

## 2023-05-12 RX ORDER — NALOXONE HYDROCHLORIDE 0.4 MG/ML
0.4 INJECTION, SOLUTION INTRAMUSCULAR; INTRAVENOUS; SUBCUTANEOUS
Status: DISCONTINUED | OUTPATIENT
Start: 2023-05-12 | End: 2023-05-13 | Stop reason: HOSPADM

## 2023-05-12 RX ORDER — PROCHLORPERAZINE MALEATE 5 MG
5 TABLET ORAL EVERY 6 HOURS PRN
Status: DISCONTINUED | OUTPATIENT
Start: 2023-05-12 | End: 2023-05-13 | Stop reason: HOSPADM

## 2023-05-12 RX ORDER — FLUMAZENIL 0.1 MG/ML
0.2 INJECTION, SOLUTION INTRAVENOUS
Status: DISCONTINUED | OUTPATIENT
Start: 2023-05-12 | End: 2023-05-13 | Stop reason: HOSPADM

## 2023-05-12 RX ORDER — FENTANYL CITRATE 50 UG/ML
INJECTION, SOLUTION INTRAMUSCULAR; INTRAVENOUS PRN
Status: DISCONTINUED | OUTPATIENT
Start: 2023-05-12 | End: 2023-05-12 | Stop reason: HOSPADM

## 2023-05-12 RX ORDER — ONDANSETRON 2 MG/ML
4 INJECTION INTRAMUSCULAR; INTRAVENOUS EVERY 6 HOURS PRN
Status: DISCONTINUED | OUTPATIENT
Start: 2023-05-12 | End: 2023-05-13 | Stop reason: HOSPADM

## 2023-05-12 RX ORDER — NALOXONE HYDROCHLORIDE 0.4 MG/ML
0.2 INJECTION, SOLUTION INTRAMUSCULAR; INTRAVENOUS; SUBCUTANEOUS
Status: DISCONTINUED | OUTPATIENT
Start: 2023-05-12 | End: 2023-05-13 | Stop reason: HOSPADM

## 2023-05-12 RX ORDER — ONDANSETRON 4 MG/1
4 TABLET, ORALLY DISINTEGRATING ORAL EVERY 6 HOURS PRN
Status: DISCONTINUED | OUTPATIENT
Start: 2023-05-12 | End: 2023-05-13 | Stop reason: HOSPADM

## 2023-05-12 NOTE — RESULT ENCOUNTER NOTE
Richard Dyson,    Thank you for your recent office visit.    Here are your recent results.  Per GI- Impression:               - Hemorrhoids found on perianal exam.                             - One 3 mm polyp in the rectum, removed with a cold                             snare. Resected and retrieved.                             - One 5 mm polyp in the ascending colon, removed                             with a cold snare. Resected and retrieved.                             - One 5 mm polyp in the cecum, removed with a cold                             snare. Resected and retrieved.                             - Two 1 to 2 mm polyps in the cecum, removed with a                             cold biopsy forceps. Resected and retrieved.                             - Diverticulosis in the sigmoid colon.                             - The examined portion of the ileum was normal.   Recommendation:           - Await pathology results.                             - Repeat colonoscopy in 2-3 years for surveillance                             given number of polyps and suboptimal prep in the                             right side of the colon.                             - Patient has a contact number available for                             emergencies. The signs and symptoms of potential                             delayed complications were discussed with the                             patient. Return to normal activities tomorrow.                             Written discharge instructions were provided to the                             patient.     Feel free to contact me via [employer] or call the clinic at [phone].    Sincerely,    Sabrina Avila, JULIO CESAR, FNP-BC

## 2023-05-12 NOTE — H&P
[facility] Anesthesia Pre-op History and Physical    Karis Youssef MRN# [id number]   Age: 73 year old YOB: 1950            Date of Exam 5/12/2023           Primary care provider: Josee Carpenter         Chief Complaint and/or Reason for Procedure:     Positive colo-guard. First colonoscopy         Active problem list:     Patient Active Problem List    Diagnosis Date Noted     Age-related osteoporosis with current pathological fracture with routine healing 06/10/2021     Priority: Medium     Compression fracture of T9 vertebra, initial encounter (H) 05/24/2021     Priority: Medium     Displaced fracture of neck of right radius 01/11/2018     Priority: Medium     Acute pain of left shoulder 07/27/2016     Priority: Medium     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 12/30/2015     Priority: Medium     Choroidal nevus, left 12/30/2015     Priority: Medium     Acute conjunctivitis 07/14/2015     Priority: Medium     Problem list name updated by automated process. Provider to review       Palpitations 06/05/2015     Priority: Medium     Advanced directives, counseling/discussion 10/04/2012     Priority: Medium     Discussed advance care planning with patient; information given to patient to review. 10/4/2012          CARDIOVASCULAR SCREENING; LDL GOAL LESS THAN 160 03/13/2012     Priority: Medium     Sjogren's syndrome (H) 08/16/2010     Priority: Medium     (Problem list name updated by automated process. Provider to review and confirm.)       Osteopenia      Priority: Medium     Fosamax x 6 months, years ago.  Side effects caused her to stop.       Thigh pain 08/14/2009     Priority: Medium            Medications (include herbals and vitamins):   Any Plavix use in the last 7 days? No     Current Outpatient Medications   Medication Sig     Calcium Carbonate-Vitamin D (CALCIUM 600 + D OR) Take 1,200 mg by mouth daily      carboxymethylcellulose (CELLUVISC/REFRESH LIQUIGEL) 1 % ophthalmic solution  Place 1 drop into both eyes 3 times daily     cetirizine (ZYRTEC) 10 MG tablet Take 10 mg by mouth daily     MAGNESIUM PO Take 250 mg by mouth daily      methocarbamol (ROBAXIN) 500 MG tablet      Multiple Vitamins-Minerals (ICAPS AREDS 2 PO) Take 1 tablet by mouth 2 times daily     oxyCODONE (ROXICODONE) 5 MG tablet Take 5 mg by mouth     Polypodium Leucotomos (HELIOCARE) 240 MG CAPS      SUDAFED 30 MG OR TABS 1 TABLET EVERY 4 TO 6 HOURS AS NEEDED     VITAMIN C 500 MG OR TABS      Vitamin D, Cholecalciferol, 25 MCG (1000 UT) TABS daily      VITAMIN E 400 UNIT PO CAPS      bisacodyl (DULCOLAX) 5 MG EC tablet Take 2 tablets at 3 pm the day before your procedure. If your procedure is before 11 am, take 2 additional tablets at 11 pm. If your procedure is after 11 am, take 2 additional tablets at 6 am. For additional instructions refer to your colonoscopy prep instructions.     diclofenac (VOLTAREN) 1 % topical gel Place onto the skin 4 times daily     metroNIDAZOLE (METROGEL) 0.75 % external gel Apply twice daily as needed to face for rosacea.     polyethylene glycol (GOLYTELY) 236 g suspension The night before the exam at 6 pm drink an 8-ounce glass every 15 minutes until the jug is half empty. If you arrive before 11 AM: Drink the other half of the Golytely jug at 11 PM night before procedure. If you arrive after 11 AM: Drink the other half of the Golytely jug at 6 AM day of procedure. For additional instructions refer to your colonoscopy prep instructions.     zoledronic Acid (RECLAST) 5 MG/100ML SOLN infusion      Current Facility-Administered Medications   Medication     lidocaine (LMX4) kit     lidocaine 1 % 0.1-1 mL     ondansetron (ZOFRAN) injection 4 mg     sodium chloride (PF) 0.9% PF flush 3 mL     sodium chloride (PF) 0.9% PF flush 3 mL             Allergies:      Allergies   Allergen Reactions     Erythromycin Nausea     Tongue turned black.     Nsaids GI Disturbance     Gastritis        Allergy to Latex?  No  Allergy to tape?   No  Intolerances:             Physical Exam:   All vitals have been reviewed  Patient Vitals for the past 8 hrs:   BP Temp Temp src Resp SpO2   05/12/23 1101 (!) 143/74 97.5  F (36.4  C) Temporal 16 95 %     No intake/output data recorded.  Lungs:   No increased work of breathing, good air exchange, clear to auscultation bilaterally, no crackles or wheezing     Cardiovascular:   normal S1 and S2             Lab / Radiology Results:            Anesthetic risk and/or ASA classification:     2  Geneva Newton, DO

## 2023-05-16 LAB
PATH REPORT.COMMENTS IMP SPEC: NORMAL
PATH REPORT.COMMENTS IMP SPEC: NORMAL
PATH REPORT.FINAL DX SPEC: NORMAL
PATH REPORT.GROSS SPEC: NORMAL
PATH REPORT.MICROSCOPIC SPEC OTHER STN: NORMAL
PATH REPORT.RELEVANT HX SPEC: NORMAL
PHOTO IMAGE: NORMAL

## 2023-05-24 ENCOUNTER — OFFICE VISIT (OUTPATIENT)
Dept: AUDIOLOGY | Facility: CLINIC | Age: 73
End: 2023-05-24
Payer: COMMERCIAL

## 2023-05-24 ENCOUNTER — OFFICE VISIT (OUTPATIENT)
Dept: OTOLARYNGOLOGY | Facility: CLINIC | Age: 73
End: 2023-05-24
Payer: COMMERCIAL

## 2023-05-24 VITALS
RESPIRATION RATE: 16 BRPM | HEART RATE: 78 BPM | DIASTOLIC BLOOD PRESSURE: 85 MMHG | OXYGEN SATURATION: 100 % | SYSTOLIC BLOOD PRESSURE: 135 MMHG

## 2023-05-24 DIAGNOSIS — R42 DIZZINESS: Primary | ICD-10-CM

## 2023-05-24 DIAGNOSIS — H90.3 SENSORINEURAL HEARING LOSS (SNHL) OF BOTH EARS: ICD-10-CM

## 2023-05-24 DIAGNOSIS — H90.3 SENSORINEURAL HEARING LOSS, BILATERAL: Primary | ICD-10-CM

## 2023-05-24 PROCEDURE — 92557 COMPREHENSIVE HEARING TEST: CPT | Performed by: AUDIOLOGIST

## 2023-05-24 PROCEDURE — 92550 TYMPANOMETRY & REFLEX THRESH: CPT | Performed by: AUDIOLOGIST

## 2023-05-24 PROCEDURE — 99213 OFFICE O/P EST LOW 20 MIN: CPT | Performed by: OTOLARYNGOLOGY

## 2023-05-24 NOTE — PROGRESS NOTES
Chief Complaint - Dizziness    History of Present Illness - Karis Youssef is a 73 year old female who presents with dizziness. The patient describes vertigo in which the entire room spins, or they spin, or there is a sense of motion.  It lasts for minutes to an hour. This has been going on since 1/2023. It happens about 1 time per week in January and February 2023. It seemed to get better and bouts were shorter, like for a few minutes. The patient denies nausea and vomiting. This is not worsened by getting up from a seated or supine position. It is NOT provoked by head movements, but the worst time was movement in a car. The patient has noted sometimes fullness in ears. She has tinnitus. No asymmetry in ear symptoms or hearing loss. The patient has tried zyrtec, sudafed and nasocort.      Tests personally reviewed today for this visit:   1.) audiogram was performed today as part of the evaluation and personally reviewed. The audiogram showed a significant symmetric mid to high frequency sensorineural hearing loss.    2.) tympanograms were performed today and were normal Type A curves, with normal canal volume and middle ear pressure.      Past Medical History -   Patient Active Problem List   Diagnosis     Thigh pain     Osteopenia     Sjogren's syndrome (H)     CARDIOVASCULAR SCREENING; LDL GOAL LESS THAN 160     Advanced directives, counseling/discussion     Palpitations     Acute conjunctivitis     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     Choroidal nevus, left     Acute pain of left shoulder     Displaced fracture of neck of right radius     Compression fracture of T9 vertebra, initial encounter (H)     Age-related osteoporosis with current pathological fracture with routine healing       Current Medications -   Current Outpatient Medications:      bisacodyl (DULCOLAX) 5 MG EC tablet, Take 2 tablets at 3 pm the day before your procedure. If your procedure is before 11 am, take 2 additional tablets at 11 pm. If  your procedure is after 11 am, take 2 additional tablets at 6 am. For additional instructions refer to your colonoscopy prep instructions., Disp: 4 tablet, Rfl: 0     Calcium Carbonate-Vitamin D (CALCIUM 600 + D OR), Take 1,200 mg by mouth daily , Disp: , Rfl:      carboxymethylcellulose (CELLUVISC/REFRESH LIQUIGEL) 1 % ophthalmic solution, Place 1 drop into both eyes 3 times daily, Disp: , Rfl:      cetirizine (ZYRTEC) 10 MG tablet, Take 10 mg by mouth daily, Disp: , Rfl:      diclofenac (VOLTAREN) 1 % topical gel, Place onto the skin 4 times daily, Disp: 100 g, Rfl: 6     MAGNESIUM PO, Take 250 mg by mouth daily , Disp: , Rfl:      methocarbamol (ROBAXIN) 500 MG tablet, , Disp: , Rfl:      metroNIDAZOLE (METROGEL) 0.75 % external gel, Apply twice daily as needed to face for rosacea., Disp: 45 g, Rfl: 11     Multiple Vitamins-Minerals (ICAPS AREDS 2 PO), Take 1 tablet by mouth 2 times daily, Disp: , Rfl:      oxyCODONE (ROXICODONE) 5 MG tablet, Take 5 mg by mouth, Disp: , Rfl:      polyethylene glycol (GOLYTELY) 236 g suspension, The night before the exam at 6 pm drink an 8-ounce glass every 15 minutes until the jug is half empty. If you arrive before 11 AM: Drink the other half of the Golytely jug at 11 PM night before procedure. If you arrive after 11 AM: Drink the other half of the Golytely jug at 6 AM day of procedure. For additional instructions refer to your colonoscopy prep instructions., Disp: 4000 mL, Rfl: 0     Polypodium Leucotomos (HELIOCARE) 240 MG CAPS, , Disp: , Rfl:      SUDAFED 30 MG OR TABS, 1 TABLET EVERY 4 TO 6 HOURS AS NEEDED, Disp: , Rfl:      VITAMIN C 500 MG OR TABS, , Disp: , Rfl:      Vitamin D, Cholecalciferol, 25 MCG (1000 UT) TABS, daily , Disp: , Rfl:      VITAMIN E 400 UNIT PO CAPS, , Disp: , Rfl:      zoledronic Acid (RECLAST) 5 MG/100ML SOLN infusion, , Disp: , Rfl:     Allergies -   Allergies   Allergen Reactions     Erythromycin Nausea     Tongue turned black.     Nsaids GI  Disturbance     Gastritis          Social History -   Social History     Socioeconomic History     Marital status:    Tobacco Use     Smoking status: Former     Packs/day: 0.00     Years: 0.00     Pack years: 0.00     Types: Cigarettes     Quit date: 1984     Years since quittin.4     Smokeless tobacco: Never     Tobacco comments:     nonsmoking hosuehold   Vaping Use     Vaping status: Never Used   Substance and Sexual Activity     Alcohol use: Yes     Comment: 1 glass of wine one/two times a week     Drug use: No     Sexual activity: Not Currently     Partners: Male   Other Topics Concern     Parent/sibling w/ CABG, MI or angioplasty before 65F 55M? 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      Hobby Hazards Yes     Comment: gardening      Sleep Concern No     Stress Concern No     Weight Concern Yes     Comment: would like to lose 30 pounds     Special Diet Yes     Comment: no red meat, wheat, or sugar     Back Care No     Exercise Yes     Bike Helmet Yes     Seat Belt Yes     Self-Exams Yes     Social Determinants of Health     Financial Resource Strain: Low Risk  (2021)    Overall Financial Resource Strain (CARDIA)      Difficulty of Paying Living Expenses: Not hard at all   Food Insecurity: No Food Insecurity (2021)    Hunger Vital Sign      Worried About Running Out of Food in the Last Year: Never true      Ran Out of Food in the Last Year: Never true   Transportation Needs: No Transportation Needs (2021)    PRAPARE - Transportation      Lack of Transportation (Medical): No      Lack of Transportation (Non-Medical): No   Physical Activity: Sufficiently Active (2021)    Exercise Vital Sign      Days of Exercise per Week: 4 days      Minutes of Exercise per Session: 40 min   Stress: No Stress Concern Present (2021)    [place] [employer] - Occupational Stress  Questionnaire      Feeling of Stress : Not at all   Social Connections: Socially Isolated (11/9/2021)    Social Connection and Isolation Panel [NHANES]      Frequency of Communication with Friends and Family: More than three times a week      Frequency of Social Gatherings with Friends and Family: Once a week      Attends Jew Services: Never      Active Member of Clubs or Organizations: No      Marital Status:    Housing Stability: Low Risk  (11/9/2021)    Housing Stability Vital Sign      Unable to Pay for Housing in the Last Year: No      Number of Places Lived in the Last Year: 1      Unstable Housing in the Last Year: No       Family History -   Family History   Problem Relation Age of Onset     C.A.D. Mother      Osteoporosis Mother      Hypertension Mother      Heart Disease Mother         CAD, CHF     Coronary Artery Disease Mother      Hyperlipidemia Mother      Diabetes Father      Hypertension Father      Cancer Brother         liver     Diabetes Brother      Diabetes Sister      Breast Cancer Sister 50     Hypertension Sister      Cancer Sister 72        non-hodgkins lymphoma     Cerebrovascular Disease Maternal Grandmother      Diabetes Paternal Grandfather      Diabetes Daughter         type 2     Connective Tissue Disorder Other         2 cousins and niece with MS; aunt with lupus     Rheumatoid Arthritis Cousin      Diabetes Niece      Hyperlipidemia Sister      Other Cancer Brother         Liver     Diabetes Brother      Other Cancer Sister         non-hodgkins lymphoma     Thyroid Disease Sister      Diabetes Sister      Hypertension Sister      Osteoporosis Sister      Cerebrovascular Disease Sister        Physical Exam  General - The patient is in no distress.  Alert, answers questions and cooperates with examination appropriately.   Voice and Breathing - The patient was breathing comfortably without the use of accessory muscles. There was no wheezing, stridor, or stertor.  The  patients voice was clear and strong, with no dysphonia.    Eyes - Pupils are reactive to light. Extraocular movements intact. Sclera were not icteric or injected, conjunctiva were pink and moist. No nystagmus.  Ears - The auricles appeared normal. The external auditory canals were nonedematous and nonerythematous. The tympanic membranes are normal in appearance, bony landmarks are intact.  No retraction, perforation, or masses.  No fluid or purulence was seen in the external canal or the middle ear.   Neurologic - Cranial nerves II-XII are grossly intact. Specifically, the facial nerve is intact, House-Brackmann grade 1 of 6. Normal Romberg. Normal gait.   Neck -  Soft, nontender. Palpation of the occipital, submental, submandibular, internal jugular chain, and supraclavicular nodes did not demonstrate any abnormal lymph nodes or masses. The parotid glands were without masses. Palpation of the thyroid was soft and smooth, with no nodules or goiter appreciated.  The trachea was midline.      A/P -     ICD-10-CM    1. Dizziness  R42 Adult Audiology  Referral      2. Sensorineural hearing loss (SNHL) of both ears  H90.3 Adult Audiology  Referral          Karis Youssef is a 73 year old female with dizziness that has resolved. Unsure etiology. She had episodes that lasted many minutes or an hour not with head movements and so BPPV seems to not be it. It has gone away so I recommend observation for now. If this returns I recommend MRI brain and vestibular testing. Repeat audiogram in 1-2 years.     Champ Barlow MD  Otolaryngology  [facility]

## 2023-05-24 NOTE — PROGRESS NOTES
AUDIOLOGY REPORT:    Patient was referred from ENT by Tai Barlow MD for audiology evaluation. Patient complains of frequent bouts of dizziness and constant bilateral tinnitus.    Testing:    Otoscopy:   Otoscopic exam indicates ears are clear of cerumen bilaterally     Tympanograms:    RIGHT: normal eardrum mobility     LEFT:   normal eardrum mobility    Reflexes (reported by stimulus ear):  RIGHT: Ipsilateral is absent at frequencies tested  RIGHT: Contralateral is absent at frequencies tested  LEFT:   Ipsilateral is absent at frequencies tested  LEFT:   Contralateral is absent at frequencies tested    Thresholds:   Pure Tone Thresholds assessed using conventional audiometry with good reliability from 250-8000 Hz bilaterally using insert earphones and circumaural headphones      RIGHT:  normal and borderline-normal from 250-2000 Hz sloping to mild and moderate sensorineural hearing loss    LEFT:    normal and borderline-normal from 250-2000 Hz sloping to mild and moderate sensorineural hearing loss    Speech Reception Threshold:    RIGHT: 20 dB HL    LEFT:   20 dB HL  Speech Reception Thresholds are in good agreement with pure tone thresholds.    Word Recognition Score:     RIGHT: 96% at 60 dB HL using NU-6 recorded word list.    LEFT:   96% at 60 dB HL using NU-6 recorded word list.    Discussed results with the patient.     Patient was returned to ENT for follow up.     Mikel Hannah MA, CCC-A  Licensed Audiologist #[id number]  5/24/2023

## 2023-05-24 NOTE — LETTER
5/24/2023         RE: Karis Youssef  [address]  [place] [address]        Dear Colleague,    Thank you for referring your patient, Karis Youssef, to the [facility]. Please see a copy of my visit note below.    Chief Complaint - Dizziness    History of Present Illness - Karis Youssef is a 73 year old female who presents with dizziness. The patient describes vertigo in which the entire room spins, or they spin, or there is a sense of motion.  It lasts for minutes to an hour. This has been going on since 1/2023. It happens about 1 time per week in January and February 2023. It seemed to get better and bouts were shorter, like for a few minutes. The patient denies nausea and vomiting. This is not worsened by getting up from a seated or supine position. It is NOT provoked by head movements, but the worst time was movement in a car. The patient has noted sometimes fullness in ears. She has tinnitus. No asymmetry in ear symptoms or hearing loss. The patient has tried zyrtec, sudafed and nasocort.      Tests personally reviewed today for this visit:   1.) audiogram was performed today as part of the evaluation and personally reviewed. The audiogram showed a significant symmetric mid to high frequency sensorineural hearing loss.    2.) tympanograms were performed today and were normal Type A curves, with normal canal volume and middle ear pressure.      Past Medical History -   Patient Active Problem List   Diagnosis     Thigh pain     Osteopenia     Sjogren's syndrome (H)     CARDIOVASCULAR SCREENING; LDL GOAL LESS THAN 160     Advanced directives, counseling/discussion     Palpitations     Acute conjunctivitis     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     Choroidal nevus, left     Acute pain of left shoulder     Displaced fracture of neck of right radius     Compression fracture of T9 vertebra, initial encounter (H)     Age-related osteoporosis with current pathological  fracture with routine healing       Current Medications -   Current Outpatient Medications:      bisacodyl (DULCOLAX) 5 MG EC tablet, Take 2 tablets at 3 pm the day before your procedure. If your procedure is before 11 am, take 2 additional tablets at 11 pm. If your procedure is after 11 am, take 2 additional tablets at 6 am. For additional instructions refer to your colonoscopy prep instructions., Disp: 4 tablet, Rfl: 0     Calcium Carbonate-Vitamin D (CALCIUM 600 + D OR), Take 1,200 mg by mouth daily , Disp: , Rfl:      carboxymethylcellulose (CELLUVISC/REFRESH LIQUIGEL) 1 % ophthalmic solution, Place 1 drop into both eyes 3 times daily, Disp: , Rfl:      cetirizine (ZYRTEC) 10 MG tablet, Take 10 mg by mouth daily, Disp: , Rfl:      diclofenac (VOLTAREN) 1 % topical gel, Place onto the skin 4 times daily, Disp: 100 g, Rfl: 6     MAGNESIUM PO, Take 250 mg by mouth daily , Disp: , Rfl:      methocarbamol (ROBAXIN) 500 MG tablet, , Disp: , Rfl:      metroNIDAZOLE (METROGEL) 0.75 % external gel, Apply twice daily as needed to face for rosacea., Disp: 45 g, Rfl: 11     Multiple Vitamins-Minerals (ICAPS AREDS 2 PO), Take 1 tablet by mouth 2 times daily, Disp: , Rfl:      oxyCODONE (ROXICODONE) 5 MG tablet, Take 5 mg by mouth, Disp: , Rfl:      polyethylene glycol (GOLYTELY) 236 g suspension, The night before the exam at 6 pm drink an 8-ounce glass every 15 minutes until the jug is half empty. If you arrive before 11 AM: Drink the other half of the Golytely jug at 11 PM night before procedure. If you arrive after 11 AM: Drink the other half of the Golytely jug at 6 AM day of procedure. For additional instructions refer to your colonoscopy prep instructions., Disp: 4000 mL, Rfl: 0     Polypodium Leucotomos (HELIOCARE) 240 MG CAPS, , Disp: , Rfl:      SUDAFED 30 MG OR TABS, 1 TABLET EVERY 4 TO 6 HOURS AS NEEDED, Disp: , Rfl:      VITAMIN C 500 MG OR TABS, , Disp: , Rfl:      Vitamin D, Cholecalciferol, 25 MCG (1000 UT)  TABS, daily , Disp: , Rfl:      VITAMIN E 400 UNIT PO CAPS, , Disp: , Rfl:      zoledronic Acid (RECLAST) 5 MG/100ML SOLN infusion, , Disp: , Rfl:     Allergies -   Allergies   Allergen Reactions     Erythromycin Nausea     Tongue turned black.     Nsaids GI Disturbance     Gastritis          Social History -   Social History     Socioeconomic History     Marital status:    Tobacco Use     Smoking status: Former     Packs/day: 0.00     Years: 0.00     Pack years: 0.00     Types: Cigarettes     Quit date: 1984     Years since quittin.4     Smokeless tobacco: Never     Tobacco comments:     nonsmoking hosuehold   Vaping Use     Vaping status: Never Used   Substance and Sexual Activity     Alcohol use: Yes     Comment: 1 glass of wine one/two times a week     Drug use: No     Sexual activity: Not Currently     Partners: Male   Other Topics Concern     Parent/sibling w/ CABG, MI or angioplasty before 65F 55M? 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      Hobby Hazards Yes     Comment: gardening      Sleep Concern No     Stress Concern No     Weight Concern Yes     Comment: would like to lose 30 pounds     Special Diet Yes     Comment: no red meat, wheat, or sugar     Back Care No     Exercise Yes     Bike Helmet Yes     Seat Belt Yes     Self-Exams Yes     Social Determinants of Health     Financial Resource Strain: Low Risk  (2021)    Overall Financial Resource Strain (CARDIA)      Difficulty of Paying Living Expenses: Not hard at all   Food Insecurity: No Food Insecurity (2021)    Hunger Vital Sign      Worried About Running Out of Food in the Last Year: Never true      Ran Out of Food in the Last Year: Never true   Transportation Needs: No Transportation Needs (2021)    PRAPARE - Transportation      Lack of Transportation (Medical): No      Lack of Transportation (Non-Medical): No   Physical Activity:  Sufficiently Active (11/9/2021)    Exercise Vital Sign      Days of Exercise per Week: 4 days      Minutes of Exercise per Session: 40 min   Stress: No Stress Concern Present (11/9/2021)    [employer] [place] [employer] - Occupational Stress Questionnaire      Feeling of Stress : Not at all   Social Connections: Socially Isolated (11/9/2021)    Social Connection and Isolation Panel [NHANES]      Frequency of Communication with Friends and Family: More than three times a week      Frequency of Social Gatherings with Friends and Family: Once a week      Attends Worship Services: Never      Active Member of Clubs or Organizations: No      Marital Status:    Housing Stability: Low Risk  (11/9/2021)    Housing Stability Vital Sign      Unable to Pay for Housing in the Last Year: No      Number of Places Lived in the Last Year: 1      Unstable Housing in the Last Year: No       Family History -   Family History   Problem Relation Age of Onset     C.A.D. Mother      Osteoporosis Mother      Hypertension Mother      Heart Disease Mother         CAD, CHF     Coronary Artery Disease Mother      Hyperlipidemia Mother      Diabetes Father      Hypertension Father      Cancer Brother         liver     Diabetes Brother      Diabetes Sister      Breast Cancer Sister 50     Hypertension Sister      Cancer Sister 72        non-hodgkins lymphoma     Cerebrovascular Disease Maternal Grandmother      Diabetes Paternal Grandfather      Diabetes Daughter         type 2     Connective Tissue Disorder Other         2 cousins and niece with MS; aunt with lupus     Rheumatoid Arthritis Cousin      Diabetes Niece      Hyperlipidemia Sister      Other Cancer Brother         Liver     Diabetes Brother      Other Cancer Sister         non-hodgkins lymphoma     Thyroid Disease Sister      Diabetes Sister      Hypertension Sister      Osteoporosis Sister      Cerebrovascular Disease Sister        Physical Exam  General - The  patient is in no distress.  Alert, answers questions and cooperates with examination appropriately.   Voice and Breathing - The patient was breathing comfortably without the use of accessory muscles. There was no wheezing, stridor, or stertor.  The patients voice was clear and strong, with no dysphonia.    Eyes - Pupils are reactive to light. Extraocular movements intact. Sclera were not icteric or injected, conjunctiva were pink and moist. No nystagmus.  Ears - The auricles appeared normal. The external auditory canals were nonedematous and nonerythematous. The tympanic membranes are normal in appearance, bony landmarks are intact.  No retraction, perforation, or masses.  No fluid or purulence was seen in the external canal or the middle ear.   Neurologic - Cranial nerves II-XII are grossly intact. Specifically, the facial nerve is intact, House-Brackmann grade 1 of 6. Normal Romberg. Normal gait.   Neck -  Soft, nontender. Palpation of the occipital, submental, submandibular, internal jugular chain, and supraclavicular nodes did not demonstrate any abnormal lymph nodes or masses. The parotid glands were without masses. Palpation of the thyroid was soft and smooth, with no nodules or goiter appreciated.  The trachea was midline.      A/P -     ICD-10-CM    1. Dizziness  R42 Adult Audiology  Referral      2. Sensorineural hearing loss (SNHL) of both ears  H90.3 Adult Audiology  Referral          Karis Youssef is a 73 year old female with dizziness that has resolved. Unsure etiology. She had episodes that lasted many minutes or an hour not with head movements and so BPPV seems to not be it. It has gone away so I recommend observation for now. If this returns I recommend MRI brain and vestibular testing. Repeat audiogram in 1-2 years.     Champ Barlow MD  Otolaryngology  [facility]        Again, thank you for allowing me to participate in the care of your patient.         Sincerely,        Champ Barlow MD

## 2023-06-14 ENCOUNTER — ANCILLARY PROCEDURE (OUTPATIENT)
Dept: BONE DENSITY | Facility: CLINIC | Age: 73
End: 2023-06-14
Attending: INTERNAL MEDICINE
Payer: COMMERCIAL

## 2023-06-14 DIAGNOSIS — M80.00XD AGE-RELATED OSTEOPOROSIS WITH CURRENT PATHOLOGICAL FRACTURE WITH ROUTINE HEALING: ICD-10-CM

## 2023-06-14 DIAGNOSIS — Z92.29 HISTORY OF BISPHOSPHONATE THERAPY: ICD-10-CM

## 2023-06-14 DIAGNOSIS — Z78.0 POST-MENOPAUSAL: ICD-10-CM

## 2023-06-14 PROCEDURE — 77080 DXA BONE DENSITY AXIAL: CPT | Performed by: INTERNAL MEDICINE

## 2023-07-10 ENCOUNTER — OFFICE VISIT (OUTPATIENT)
Dept: RHEUMATOLOGY | Facility: CLINIC | Age: 73
End: 2023-07-10
Payer: COMMERCIAL

## 2023-07-10 VITALS
BODY MASS INDEX: 36.39 KG/M2 | WEIGHT: 212.6 LBS | HEART RATE: 81 BPM | OXYGEN SATURATION: 95 % | DIASTOLIC BLOOD PRESSURE: 81 MMHG | SYSTOLIC BLOOD PRESSURE: 149 MMHG

## 2023-07-10 DIAGNOSIS — Z92.29 HISTORY OF BISPHOSPHONATE THERAPY: ICD-10-CM

## 2023-07-10 DIAGNOSIS — M80.00XD AGE-RELATED OSTEOPOROSIS WITH CURRENT PATHOLOGICAL FRACTURE WITH ROUTINE HEALING: Primary | ICD-10-CM

## 2023-07-10 DIAGNOSIS — M35.01 SJOGREN'S SYNDROME WITH KERATOCONJUNCTIVITIS SICCA (H): ICD-10-CM

## 2023-07-10 LAB
ALBUMIN MFR UR ELPH: 12.2 MG/DL
ALBUMIN SERPL BCG-MCNC: 4.4 G/DL (ref 3.5–5.2)
ALBUMIN UR-MCNC: NEGATIVE MG/DL
ALP SERPL-CCNC: 81 U/L (ref 35–104)
ALT SERPL W P-5'-P-CCNC: 16 U/L (ref 0–50)
ANION GAP SERPL CALCULATED.3IONS-SCNC: 11 MMOL/L (ref 7–15)
APPEARANCE UR: CLEAR
AST SERPL W P-5'-P-CCNC: 23 U/L (ref 0–45)
BASOPHILS # BLD AUTO: 0.1 10E3/UL (ref 0–0.2)
BASOPHILS NFR BLD AUTO: 1 %
BILIRUB SERPL-MCNC: 0.3 MG/DL
BILIRUB UR QL STRIP: NEGATIVE
BUN SERPL-MCNC: 18.2 MG/DL (ref 8–23)
CALCIUM SERPL-MCNC: 9.8 MG/DL (ref 8.8–10.2)
CHLORIDE SERPL-SCNC: 105 MMOL/L (ref 98–107)
COLOR UR AUTO: YELLOW
CREAT SERPL-MCNC: 0.57 MG/DL (ref 0.51–0.95)
CREAT UR-MCNC: 121 MG/DL
DEPRECATED CALCIDIOL+CALCIFEROL SERPL-MC: 51 UG/L (ref 20–75)
DEPRECATED HCO3 PLAS-SCNC: 24 MMOL/L (ref 22–29)
EOSINOPHIL # BLD AUTO: 0.2 10E3/UL (ref 0–0.7)
EOSINOPHIL NFR BLD AUTO: 3 %
ERYTHROCYTE [DISTWIDTH] IN BLOOD BY AUTOMATED COUNT: 13.4 % (ref 10–15)
GFR SERPL CREATININE-BSD FRML MDRD: >90 ML/MIN/1.73M2
GLUCOSE SERPL-MCNC: 97 MG/DL (ref 70–99)
GLUCOSE UR STRIP-MCNC: NEGATIVE MG/DL
HCT VFR BLD AUTO: 39.8 % (ref 35–47)
HGB BLD-MCNC: 13.2 G/DL (ref 11.7–15.7)
HGB UR QL STRIP: ABNORMAL
IMM GRANULOCYTES # BLD: 0 10E3/UL
IMM GRANULOCYTES NFR BLD: 0 %
KETONES UR STRIP-MCNC: ABNORMAL MG/DL
LEUKOCYTE ESTERASE UR QL STRIP: NEGATIVE
LYMPHOCYTES # BLD AUTO: 2.6 10E3/UL (ref 0.8–5.3)
LYMPHOCYTES NFR BLD AUTO: 37 %
MCH RBC QN AUTO: 29.5 PG (ref 26.5–33)
MCHC RBC AUTO-ENTMCNC: 33.2 G/DL (ref 31.5–36.5)
MCV RBC AUTO: 89 FL (ref 78–100)
MONOCYTES # BLD AUTO: 0.7 10E3/UL (ref 0–1.3)
MONOCYTES NFR BLD AUTO: 9 %
NEUTROPHILS # BLD AUTO: 3.6 10E3/UL (ref 1.6–8.3)
NEUTROPHILS NFR BLD AUTO: 50 %
NITRATE UR QL: NEGATIVE
PH UR STRIP: 6 [PH] (ref 5–7)
PLATELET # BLD AUTO: 358 10E3/UL (ref 150–450)
POTASSIUM SERPL-SCNC: 4.4 MMOL/L (ref 3.4–5.3)
PROT SERPL-MCNC: 7.3 G/DL (ref 6.4–8.3)
PROT/CREAT 24H UR: 0.1 MG/MG CR (ref 0–0.2)
RBC # BLD AUTO: 4.47 10E6/UL (ref 3.8–5.2)
RBC #/AREA URNS AUTO: NORMAL /HPF
SODIUM SERPL-SCNC: 140 MMOL/L (ref 136–145)
SP GR UR STRIP: 1.02 (ref 1–1.03)
UROBILINOGEN UR STRIP-ACNC: 0.2 E.U./DL
WBC # BLD AUTO: 7.1 10E3/UL (ref 4–11)
WBC #/AREA URNS AUTO: NORMAL /HPF

## 2023-07-10 PROCEDURE — 99214 OFFICE O/P EST MOD 30 MIN: CPT | Performed by: INTERNAL MEDICINE

## 2023-07-10 PROCEDURE — 82306 VITAMIN D 25 HYDROXY: CPT | Performed by: INTERNAL MEDICINE

## 2023-07-10 PROCEDURE — 81001 URINALYSIS AUTO W/SCOPE: CPT | Performed by: INTERNAL MEDICINE

## 2023-07-10 PROCEDURE — 85025 COMPLETE CBC W/AUTO DIFF WBC: CPT | Performed by: INTERNAL MEDICINE

## 2023-07-10 PROCEDURE — 36415 COLL VENOUS BLD VENIPUNCTURE: CPT | Performed by: INTERNAL MEDICINE

## 2023-07-10 PROCEDURE — 80053 COMPREHEN METABOLIC PANEL: CPT | Performed by: INTERNAL MEDICINE

## 2023-07-10 PROCEDURE — 84156 ASSAY OF PROTEIN URINE: CPT | Performed by: INTERNAL MEDICINE

## 2023-07-10 RX ORDER — EPINEPHRINE 1 MG/ML
0.3 INJECTION, SOLUTION, CONCENTRATE INTRAVENOUS EVERY 5 MIN PRN
Status: CANCELLED | OUTPATIENT
Start: 2023-08-22

## 2023-07-10 RX ORDER — HEPARIN SODIUM,PORCINE 10 UNIT/ML
5-20 VIAL (ML) INTRAVENOUS DAILY PRN
Status: CANCELLED | OUTPATIENT
Start: 2023-08-22

## 2023-07-10 RX ORDER — METHYLPREDNISOLONE SODIUM SUCCINATE 125 MG/2ML
125 INJECTION, POWDER, LYOPHILIZED, FOR SOLUTION INTRAMUSCULAR; INTRAVENOUS
Status: CANCELLED
Start: 2023-08-22

## 2023-07-10 RX ORDER — DIPHENHYDRAMINE HYDROCHLORIDE 50 MG/ML
50 INJECTION INTRAMUSCULAR; INTRAVENOUS
Status: CANCELLED
Start: 2023-08-22

## 2023-07-10 RX ORDER — MEPERIDINE HYDROCHLORIDE 25 MG/ML
25 INJECTION INTRAMUSCULAR; INTRAVENOUS; SUBCUTANEOUS EVERY 30 MIN PRN
Status: CANCELLED | OUTPATIENT
Start: 2023-08-22

## 2023-07-10 RX ORDER — ALBUTEROL SULFATE 90 UG/1
1-2 AEROSOL, METERED RESPIRATORY (INHALATION)
Status: CANCELLED
Start: 2023-08-22

## 2023-07-10 RX ORDER — HEPARIN SODIUM (PORCINE) LOCK FLUSH IV SOLN 100 UNIT/ML 100 UNIT/ML
5 SOLUTION INTRAVENOUS
Status: CANCELLED | OUTPATIENT
Start: 2023-08-22

## 2023-07-10 RX ORDER — ALBUTEROL SULFATE 0.83 MG/ML
2.5 SOLUTION RESPIRATORY (INHALATION)
Status: CANCELLED | OUTPATIENT
Start: 2023-08-22

## 2023-07-10 RX ORDER — ZOLEDRONIC ACID 5 MG/100ML
5 INJECTION, SOLUTION INTRAVENOUS ONCE
Status: CANCELLED
Start: 2023-08-22

## 2023-07-10 NOTE — PROGRESS NOTES
Rheumatology Clinic Visit      Karis Youssef MRN# [id number]   YOB: 1950 Age: 73 year old      Date of visit: 7/10/23   PCP: Sabrina Matthews    Chief Complaint   Patient presents with:  Osteoporosis  Sjogren's syndrome    Assessment and Plan     1. Sjogren's Syndrome (ZELDA+, SSA+, sicca symptoms): Diagnosed in 2009 by Dr. Delatorre. Currently doing well with frequent sips of water and artificial tears.    - Lab today: CBC, CMP, UA, Uprotein:creatinine    2.  History of bilateral knee pain:  Osteoarthritis and meniscal tears based on patient history and following with orthopedic surgeon at [facility].  Symptoms are degenerative in nature.  She does not limit her activities because of knee pain.      3.  Osteoporosis: Vertebral compression fracture seen on 5/18/2021 MRI of the lumbar spine performed at [facility].  Previously had a diagnosis of osteopenia based on 2017 DEXA.   She has a history of being on Fosamax in the past but had significant heartburn; she also reports having easy heartburn with any NSAID.  Therefore, avoided oral bisphosphonates and started zoledronic acid with the first dose on 7/1/2021, and second dose received on 8/22/2022.  6/14/2023 DEXA stable; plan to repeat DEXA in 2026.  - Continue zoledronic acid 5 mg IV once yearly, next due on 8/22/2023  - Continue vitamin D 1000 IU daily  - Continue calcium 1200 mg daily    4. R>L dorsal midfoot pain: No swelling.  Worse after increased activity.  Consistent with degenerative arthritis of the midfoot.  Advised wearing supportive shoes whenever ambulatory, indoors and outdoors.  If no improvement then consider seeing podiatry.    5. Elevated blood pressure:  Karis to follow up with Primary Care provider regarding elevated blood pressure.     Total minutes spent in evaluation with patient, documentation, , and review of pertinent studies and chart notes: 20     Ms. Youssef verbalized agreement with and  understanding of the rational for the diagnosis and treatment plan.  All questions were answered to best of my ability and the patient's satisfaction. Ms. Youssef was advised to contact the clinic with any questions that may arise after the clinic visit.      Thank you for involving me in the care of the patient    Return to clinic: 1 year    HPI   Karis Youssef is a 73 year old female with a past medical history significant for Sjogren's syndrome and osteoporosis who is seen for rheumatology follow-up    4/23/2013 rheumatology clinic note by Dr. Kwabena Delatorre documents that the patient was following up for a positive SSA and sicca symptoms..  Positive ZELDA.  Positive SSB.  Does not have evidence of other connective tissue disease.  No arthritis, vasculitis, and her review of systems for respiratory, cardiovascular, and CNS is normal.  No rash or parotid swelling.  No new neurologic symptoms.  No lymphadenopathy.  He advised lip biopsy to confirm.    10/4/2018 clinic note by Dr. Carpenter documents Sjogren's with possibly increasing symptoms so was referred to rheumatology.    2/19/2019: Ms. Youssef reports that she has had joint issues most of her life. TMJ tx'd by not chewing gum.  Bilateral knee pain: started 20 yrs ago with torn meniscus, and now bone on bone and was told she may need TKA. Left knee sometimes gives out so she uses hiking poles; was able to hike all over [place] going up and down stairs with worsening knee pain that resolved with rest and acupuncture.  Plans to retire in May 2019.  Fell last year with distal radius and distal ulna fracture s/p ORIF.   Some stiffness in the dorsal midfeet for 10 minutes; and sometimes in the left shoulder that resolves within 10 minutes. Hx of left frozen shoulder that resolved with physical therapy. There was a concern for COPD at one point but she saw a pulmonologist who told her that she does not have COPD.  Thicker respiratory secretions in the morning that  takes a little bit of time to get them going; she was told that she may have postnasal drip by her allergist and is taking Claritin for this.  Dry mouth is treated with frequent sips of water; she reports seeing a dentist twice yearly and has not had recent cavities.  Dry eyes are treated with artificial tears 3 times daily; she does not notice a difference between the ones that are preservative-free versus the ones that have preservative.  She tells me that her ophthalmologist recommended she take Claritin for allergies.    Currently doing well.  Following with her dentist regularly6/3/2021: She reports that she is coming in today because of osteoporosis.  Regarding Sjogren's, she says that everything is stable.  She continues to use frequent sips of water and reports having fairly good oral dentition but does note having a history of TMJ in the past and does grind her teeth.  She follows with the dentist regularly.  She also continues to use artificial tears 1-2 times per day with good control of her dry eyes most recently she was installing a fence and bending over to put screws in at the bottom of the fence; she felt some pain in her back that was evaluated and found to be a vertebral compression fracture.  She has followed with a surgeon who told her that the compression fracture have so far appeared stable and therefore no intervention from surgical or interventional radiology perspective is needed at this time.  She is currently on calcitonin that has helped some with the pain.  She finds it Tylenol 1000 mg twice daily is also effective for her back pain.  She is here to discuss osteoporosis treatment.  She notes in the past she has had significant heartburn with Fosamax and any NSAID.    6/2/2022: Currently doing well.  Did not feel well after last Reclast infusion but hoping the next infusion is better tolerated.  Dry eye well controlled with eyedrops as needed.  Dry mouth controlled with frequent sips of  water.  Following with a dentist regularly and has not had recent dental caries.  No night sweats.  No unintentional weight loss.    Today, 7/10/2023: Doing well.  Fell and broke her right humerus and left patella; both treated nonoperatively and completely healed at this point per patient.  Due for Reclast infusion in August 2023.  Taking calcium and vitamin D supplementations.  Dry eye and dry mouth controlled, stable.  Occasional dorsal midfoot pain, worse on the right, worse after increased activity and improves with rest.  No chest pain or pressure or shortness of breath.  No nausea, vomiting, constipation, or diarrhea.  No fevers or chills.  No pain or burning with urination.  No increased urinary frequency.  No UTI symptoms.    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 [employer]    ROS   12 point review of system was completed and negative except as noted in the HPI     Active Problem List     Patient Active Problem List   Diagnosis     Thigh pain     Osteopenia     Sjogren's syndrome (H)     CARDIOVASCULAR SCREENING; LDL GOAL LESS THAN 160     Advanced directives, counseling/discussion     Palpitations     Acute conjunctivitis     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     Choroidal nevus, left     Acute pain of left shoulder     Displaced fracture of neck of right radius     Compression fracture of T9 vertebra, initial encounter (H)     Age-related osteoporosis with current pathological fracture with routine healing     Past Medical History     Past Medical History:   Diagnosis Date     Abnormal Pap smear 1995    had colpo 1995-hyperplasia, nl since     Arthritis      Chronic obstructive pulmonary disease (H)      Chronic obstructive pulmonary disease, unspecified COPD type (H)      Disturbance of skin sensation      Osteopenia      Osteopenia      Seasonal allergies      Sjogren's syndrome (H)      Past Surgical History      Past Surgical History:   Procedure Laterality Date     BIOPSY  2000    right breast lumpectomy     BREAST LUMPECTOMY, RT/LT      right breast, benign     CATARACT IOL, RT/LT      bilateral     COLONOSCOPY N/A 5/12/2023    Procedure: COLONOSCOPY, WITH POLYPECTOMY AND BIOPSY;  Surgeon: Geneva Newton DO;  Location: [facility] OR     COLONOSCOPY N/A 5/12/2023    Procedure: COLONOSCOPY, FLEXIBLE, WITH LESION REMOVAL USING SNARE;  Surgeon: Geneva Newton DO;  Location: [facility] OR     COLONOSCOPY WITH CO2 INSUFFLATION N/A 5/12/2023    Procedure: Colonoscopy with CO2 insufflation;  Surgeon: Geneva Newton DO;  Location: [facility] OR     ORTHOPEDIC SURGERY  01/2018    right radius      Allergy     Allergies   Allergen Reactions     Erythromycin Nausea     Tongue turned black.     Nsaids GI Disturbance     Gastritis        Current Medication List     Current Outpatient Medications   Medication Sig     Calcium Carbonate-Vitamin D (CALCIUM 600 + D OR) Take 1,200 mg by mouth daily      carboxymethylcellulose (CELLUVISC/REFRESH LIQUIGEL) 1 % ophthalmic solution Place 1 drop into both eyes 3 times daily     cetirizine (ZYRTEC) 10 MG tablet Take 10 mg by mouth daily     diclofenac (VOLTAREN) 1 % topical gel Place onto the skin 4 times daily     MAGNESIUM PO Take 250 mg by mouth daily      metroNIDAZOLE (METROGEL) 0.75 % external gel Apply twice daily as needed to face for rosacea.     Multiple Vitamins-Minerals (ICAPS AREDS 2 PO) Take 1 tablet by mouth 2 times daily     Polypodium Leucotomos (HELIOCARE) 240 MG CAPS      SUDAFED 30 MG OR TABS 1 TABLET EVERY 4 TO 6 HOURS AS NEEDED     VITAMIN C 500 MG OR TABS      Vitamin D, Cholecalciferol, 25 MCG (1000 UT) TABS daily      VITAMIN E 400 UNIT PO CAPS      zoledronic Acid (RECLAST) 5 MG/100ML SOLN infusion      bisacodyl (DULCOLAX) 5 MG EC tablet Take 2 tablets at 3 pm the day before your procedure. If your procedure is before 11 am, take 2 additional tablets at 11 pm. If your procedure is  after 11 am, take 2 additional tablets at 6 am. For additional instructions refer to your colonoscopy prep instructions.     methocarbamol (ROBAXIN) 500 MG tablet  (Patient not taking: Reported on 5/24/2023)     oxyCODONE (ROXICODONE) 5 MG tablet Take 5 mg by mouth (Patient not taking: Reported on 5/24/2023)     polyethylene glycol (GOLYTELY) 236 g suspension The night before the exam at 6 pm drink an 8-ounce glass every 15 minutes until the jug is half empty. If you arrive before 11 AM: Drink the other half of the Golytely jug at 11 PM night before procedure. If you arrive after 11 AM: Drink the other half of the Golytely jug at 6 AM day of procedure. For additional instructions refer to your colonoscopy prep instructions. (Patient not taking: Reported on 5/24/2023)     No current facility-administered medications for this visit.     Social History   See HPI    Family History     Family History   Problem Relation Age of Onset     C.A.D. Mother      Osteoporosis Mother      Hypertension Mother      Heart Disease Mother         CAD, CHF     Coronary Artery Disease Mother      Hyperlipidemia Mother      Diabetes Father      Hypertension Father      Cancer Brother         liver     Diabetes Brother      Diabetes Sister      Breast Cancer Sister 50     Hypertension Sister      Cancer Sister 72        non-hodgkins lymphoma     Cerebrovascular Disease Maternal Grandmother      Diabetes Paternal Grandfather      Diabetes Daughter         type 2     Connective Tissue Disorder Other         2 cousins and niece with MS; aunt with lupus     Rheumatoid Arthritis Cousin      Diabetes Niece      Hyperlipidemia Sister      Other Cancer Brother         Liver     Diabetes Brother      Other Cancer Sister         non-hodgkins lymphoma     Thyroid Disease Sister      Diabetes Sister      Hypertension Sister      Osteoporosis Sister      Cerebrovascular Disease Sister        Physical Exam     Temp Readings from Last 3 Encounters:  "  05/12/23 97.5  F (36.4  C) (Temporal)   01/17/23 97.3  F (36.3  C) (Tympanic)   08/22/22 97.7  F (36.5  C) (Oral)     BP Readings from Last 5 Encounters:   07/10/23 (!) 149/81   05/24/23 135/85   05/12/23 120/63   01/17/23 118/66   08/22/22 126/81     Pulse Readings from Last 1 Encounters:   07/10/23 81     Resp Readings from Last 1 Encounters:   05/24/23 16     Estimated body mass index is 36.39 kg/m  as calculated from the following:    Height as of 1/17/23: 1.628 m (5' 4.09\").    Weight as of this encounter: 96.4 kg (212 lb 9.6 oz).    GEN: NAD. Healthy appearing adult.   HEENT: Dry mucous membranes.  Anicteric, noninjected sclera. No obvious external lesions of the ear and nose. Hearing intact.  CV: S1, S2. RRR. No m/r/g  PULM: No increased work of breathing. CTA bilaterally   MSK: MCPs, PIPs, DIPs without swelling or tenderness to palpation.  Wrists without swelling or tenderness to palpation.  Feet without swelling or tenderness to palpation.   SKIN: No rash or jaundice seen  PSYCH: Alert. Appropriate.        Labs / Imaging (select studies)     ZELDA by EIA positive in 2009    CBC  Recent Labs   Lab Test 06/02/22  1326 02/19/19  0951 01/17/18  1024   WBC 8.5 7.5 7.9   RBC 4.49 4.38 4.17   HGB 13.1 12.8 12.0   HCT 41.6 39.9 38.4   MCV 93 91 92   RDW 13.8 13.7 13.7    317 288   MCH 29.2 29.2 28.8   MCHC 31.5 32.1 31.3*   NEUTROPHIL 50 50.9  --    LYMPH 36 35.3  --    MONOCYTE 10 9.5  --    EOSINOPHIL 4 3.8  --    BASOPHIL 0 0.5  --    ANEU  --  3.8  --    ALYM  --  2.7  --    MATHEUS  --  0.7  --    AEOS  --  0.3  --    ABAS  --  0.0  --    ANEUTAUTO 4.3  --   --    ALYMPAUTO 3.0  --   --    AMONOAUTO 0.9  --   --    AEOSAUTO 0.4  --   --    ABSBASO 0.0  --   --      [facility]  Recent Labs   Lab Test 01/17/23  0759 08/22/22  1015 06/02/22  1326 12/22/21  0917 06/03/21  1328 12/21/20  0843 10/08/19  1003 02/19/19  0951 08/14/17  0953   NA  --   --   --   --   --   --   --  141  --    POTASSIUM  --   --   --   --   " --   --   --  4.4  --    CHLORIDE  --   --   --   --   --   --   --  107  --    CO2  --   --   --   --   --   --   --  27  --    ANIONGAP  --   --   --   --   --   --   --  7  --    *  --   --  103*  --  99 106* 99 99   BUN  --   --   --   --   --   --   --  17  --    CR  --  0.65 0.65  --  0.75  --   --  0.64  --    GFRESTIMATED  --  >90 >90  --  81  --   --  >90  --    GFRESTBLACK  --   --   --   --  >90  --   --  >90  --    VINNY  --  9.4 9.9  --  9.4  --   --  9.6  --    BILITOTAL  --   --   --   --   --   --   --  0.3  --    ALBUMIN  --   --  3.9  --   --   --   --  3.6  --    PROTTOTAL  --   --   --   --   --   --   --  7.1  --    ALKPHOS  --   --   --   --   --   --   --  103  --    AST  --   --   --   --   --   --   --  21  --    ALT  --   --   --   --   --   --   --  28  --      Calcium/VitaminD  Recent Labs   Lab Test 08/22/22  1015 06/02/22  1326 06/03/21  1328   VINNY 9.4 9.9 9.4   VITDT  --  44 35     TSH/T4  Recent Labs   Lab Test 01/17/23  0759 12/22/21  0917 08/14/17  0953   TSH 4.68* 2.68 2.62   T4 1.05  --   --      Hepatitis C  Recent Labs   Lab Test 05/18/16  0955   HCVAB Nonreactive   Assay performance characteristics have not been established for newborns,   infants, and children       Lyme ab screening  Recent Labs   Lab Test 05/18/16  0955   LYMEGM 0.06     UA  Recent Labs   Lab Test 02/19/19  1003   COLOR Yellow   APPEARANCE Clear   URINEGLC Negative   URINEBILI Negative   SG 1.020   URINEPH 6.0   PROTEIN Negative   UROBILINOGEN 0.2   NITRITE Negative   UBLD Negative   LEUKEST Negative     Urine Protein  GHUTP and UTP= Urine protein (random), GHUTPG and UTPG = urine protein:creatinine ratio (random), UCRR = urine creatinine (random)  Recent Labs   Lab Test 02/19/19  1003   UTP 0.05   UTPG 0.06   UCRR 86     Immunization History     Immunization History   Administered Date(s) Administered     COVID-19 Bivalent 12+ (Pfizer) 09/22/2022     COVID-19 Monovalent 18+ (Moderna) 11/12/2021      COVID-19 Vaccine ([place]) 03/10/2021     Influenza (intradermal) 11/28/2011     Pneumo Conj 13-V (2010&after) 01/11/2018     Pneumococcal 23 valent 10/04/2018     TD,PF 7+ (Tenivac) 08/01/2005     TDAP (Adacel,Boostrix) 06/09/2022     TDAP Vaccine (Adacel) 04/18/2012          Chart documentation done in part with Dragon Voice recognition Software. Although reviewed after completion, some word and grammatical error may remain.    Finesse Saunders MD

## 2023-07-10 NOTE — PATIENT INSTRUCTIONS
RHEUMATOLOGY    [facility]  [address] [place] [address]  [place] [address]    Phone number: [phone]  Fax number: [phone]    If you need a medication refill, please contact us as you may need lab work and/or a follow up visit prior to your refill.      Thank you for choosing [facility]!    Anne Costello CMA Rheumatology    -------------------------------    Please call to schedule the zoledronic acid infusion to be on or shortly after 8/22/2023    [place]  [facility]  [id number] [place] [address]  [phone]

## 2023-08-08 ENCOUNTER — TELEPHONE (OUTPATIENT)
Dept: FAMILY MEDICINE | Facility: CLINIC | Age: 73
End: 2023-08-08

## 2023-08-08 NOTE — TELEPHONE ENCOUNTER
Patient Quality Outreach    Patient is due for the following:   Breast Cancer Screening - Mammogram  Physical Annual Wellness Visit      Topic Date Due    Zoster (Shingles) Vaccine (1 of 2) Never done    COVID-19 Vaccine (4 - Additional dose for Salinas series) 01/22/2023       Next Steps:   Schedule a Annual Wellness Visit    Type of outreach:    Sent [employer]oon message.    Next Steps:  Reach out within 90 days via [employer]oon.    Max number of attempts reached: No. Will try again in 90 days if patient still on fail list.    Questions for provider review:    None           Carrie Wilde MA  Chart routed to Provider.

## 2023-08-16 ENCOUNTER — TRANSFERRED RECORDS (OUTPATIENT)
Dept: HEALTH INFORMATION MANAGEMENT | Facility: CLINIC | Age: 73
End: 2023-08-16
Payer: COMMERCIAL

## 2023-08-25 ENCOUNTER — INFUSION THERAPY VISIT (OUTPATIENT)
Dept: INFUSION THERAPY | Facility: CLINIC | Age: 73
End: 2023-08-25
Payer: COMMERCIAL

## 2023-08-25 VITALS
DIASTOLIC BLOOD PRESSURE: 84 MMHG | WEIGHT: 213.3 LBS | BODY MASS INDEX: 36.51 KG/M2 | SYSTOLIC BLOOD PRESSURE: 143 MMHG | HEART RATE: 71 BPM | OXYGEN SATURATION: 93 % | TEMPERATURE: 97.8 F

## 2023-08-25 DIAGNOSIS — M80.00XD AGE-RELATED OSTEOPOROSIS WITH CURRENT PATHOLOGICAL FRACTURE WITH ROUTINE HEALING: Primary | ICD-10-CM

## 2023-08-25 PROCEDURE — 96365 THER/PROPH/DIAG IV INF INIT: CPT | Performed by: INTERNAL MEDICINE

## 2023-08-25 RX ORDER — HEPARIN SODIUM,PORCINE 10 UNIT/ML
5-20 VIAL (ML) INTRAVENOUS DAILY PRN
OUTPATIENT
Start: 2023-08-25

## 2023-08-25 RX ORDER — ZOLEDRONIC ACID 5 MG/100ML
5 INJECTION, SOLUTION INTRAVENOUS ONCE
Status: CANCELLED
Start: 2023-08-25

## 2023-08-25 RX ORDER — ALBUTEROL SULFATE 0.83 MG/ML
2.5 SOLUTION RESPIRATORY (INHALATION)
OUTPATIENT
Start: 2023-08-25

## 2023-08-25 RX ORDER — MEPERIDINE HYDROCHLORIDE 25 MG/ML
25 INJECTION INTRAMUSCULAR; INTRAVENOUS; SUBCUTANEOUS EVERY 30 MIN PRN
OUTPATIENT
Start: 2023-08-25

## 2023-08-25 RX ORDER — ZOLEDRONIC ACID 5 MG/100ML
5 INJECTION, SOLUTION INTRAVENOUS ONCE
Status: COMPLETED | OUTPATIENT
Start: 2023-08-25 | End: 2023-08-25

## 2023-08-25 RX ORDER — ALBUTEROL SULFATE 90 UG/1
1-2 AEROSOL, METERED RESPIRATORY (INHALATION)
Start: 2023-08-25

## 2023-08-25 RX ORDER — EPINEPHRINE 1 MG/ML
0.3 INJECTION, SOLUTION INTRAMUSCULAR; SUBCUTANEOUS EVERY 5 MIN PRN
OUTPATIENT
Start: 2023-08-25

## 2023-08-25 RX ORDER — HEPARIN SODIUM (PORCINE) LOCK FLUSH IV SOLN 100 UNIT/ML 100 UNIT/ML
5 SOLUTION INTRAVENOUS
OUTPATIENT
Start: 2023-08-25

## 2023-08-25 RX ORDER — METHYLPREDNISOLONE SODIUM SUCCINATE 125 MG/2ML
125 INJECTION, POWDER, LYOPHILIZED, FOR SOLUTION INTRAMUSCULAR; INTRAVENOUS
Start: 2023-08-25

## 2023-08-25 RX ORDER — DIPHENHYDRAMINE HYDROCHLORIDE 50 MG/ML
50 INJECTION INTRAMUSCULAR; INTRAVENOUS
Start: 2023-08-25

## 2023-08-25 RX ADMIN — Medication 250 ML: at 10:51

## 2023-08-25 RX ADMIN — ZOLEDRONIC ACID 5 MG: 0.05 INJECTION, SOLUTION INTRAVENOUS at 10:53

## 2023-08-25 ASSESSMENT — PAIN SCALES - GENERAL: PAINLEVEL: NO PAIN (0)

## 2023-08-25 NOTE — PROGRESS NOTES
Infusion Nursing Note:  Karis Youssef presents today for Reclast.    Patient seen by provider today: No   present during visit today: Not Applicable.    Note: Pt denies any new complaints, see flow sheet for assessment. Admits to taking Tylenol ES prior to coming in today per her providers recommendation.      Intravenous Access:  Peripheral IV placed.    Treatment Conditions:  Lab Results   Component Value Date     07/10/2023    POTASSIUM 4.4 07/10/2023    MAG 2.2 10/08/2019    CR 0.57 07/10/2023    VINNY 9.8 07/10/2023    BILITOTAL 0.3 07/10/2023    ALBUMIN 4.4 07/10/2023    ALT 16 07/10/2023    AST 23 07/10/2023       Results reviewed, labs MET treatment parameters, ok to proceed with treatment.      Post Infusion Assessment:  Patient tolerated infusion without incident.  Blood return noted pre and post infusion.  Site patent and intact, free from redness, edema or discomfort.  No evidence of extravasations.  Access discontinued per protocol.       Discharge Plan:   Patient discharged in stable condition accompanied by: self.  Departure Mode: Ambulatory.  Pt will call to schedule next reclast after Dr Saunders appt next year.      Shamar Munoz RN

## 2023-09-25 ENCOUNTER — OFFICE VISIT (OUTPATIENT)
Dept: URGENT CARE | Facility: URGENT CARE | Age: 73
End: 2023-09-25
Payer: COMMERCIAL

## 2023-09-25 ENCOUNTER — ANCILLARY PROCEDURE (OUTPATIENT)
Dept: GENERAL RADIOLOGY | Facility: CLINIC | Age: 73
End: 2023-09-25
Attending: FAMILY MEDICINE
Payer: COMMERCIAL

## 2023-09-25 VITALS
BODY MASS INDEX: 36.45 KG/M2 | HEART RATE: 75 BPM | WEIGHT: 213 LBS | RESPIRATION RATE: 18 BRPM | TEMPERATURE: 99.2 F | DIASTOLIC BLOOD PRESSURE: 83 MMHG | OXYGEN SATURATION: 96 % | SYSTOLIC BLOOD PRESSURE: 153 MMHG

## 2023-09-25 DIAGNOSIS — M54.16 LUMBAR RADICULOPATHY: ICD-10-CM

## 2023-09-25 DIAGNOSIS — M51.379 DEGENERATION OF LUMBAR OR LUMBOSACRAL INTERVERTEBRAL DISC: ICD-10-CM

## 2023-09-25 DIAGNOSIS — M54.41 ACUTE RIGHT-SIDED LOW BACK PAIN WITH RIGHT-SIDED SCIATICA: Primary | ICD-10-CM

## 2023-09-25 PROCEDURE — 99214 OFFICE O/P EST MOD 30 MIN: CPT | Performed by: FAMILY MEDICINE

## 2023-09-25 PROCEDURE — 72100 X-RAY EXAM L-S SPINE 2/3 VWS: CPT | Mod: TC | Performed by: RADIOLOGY

## 2023-09-25 NOTE — PROGRESS NOTES
(M54.41) Acute right-sided low back pain with right-sided sciatica  (primary encounter diagnosis)  Comment:   Plan: XR Lumbar Spine 2/3 Views, Spine          Referral            (M54.16) Lumbar radiculopathy  Comment:   Plan: Spine  Referral            (M51.37) Degeneration of lumbar or lumbosacral intervertebral disc  Comment:   Plan: Spine  Referral      Discussion:    Lumbar radicular symptoms intermittently.  May be becoming more frequent.  Significantly abnormal lumbar spine films.  She has some previous spine exercises which she could carefully try.  Obtain opinion from spinal specialist.    She voices understanding of recommendations and is agreeable with these plans.              CHIEF COMPLAINT    Right-sided low back pain.  Radiates to right leg.      HISTORY    This is a 73-year-old woman who has had episodic symptoms.  She initially noticed this back in July when she stepped down onto her right leg and developed right low back and right buttock pain which radiated down toward the knee.  Buttock kind of cramped up at that point.  It gradually worked out over several days.  She had a similar episode about a week ago when she was out shopping.  Then 3 days ago she had some pain which was similar when she was at rest.    She is not having red flag symptoms such as incontinence of urine, numbness, localized weakness or incoordination.  No fever.    She has not had recent medical attention regarding her low back.      REVIEW OF SYSTEMS    No fever or chills.  No shortness of breath or cough.  No chest pain.  No vomiting or diarrhea.  No rashes.      EXAM  BP (!) 153/83   Pulse 75   Temp 99.2  F (37.3  C) (Tympanic)   Resp 18   Wt 96.6 kg (213 lb)   SpO2 96%   BMI 36.45 kg/m      BMI is somewhat elevated.  Patient is alert.  HEENT unremarkable.  Nonlabored breathing.  Cardiac regular without murmurs.  Mild tenderness in area of right SIJ.  Nontender directly over lumbar  spine.  SLR is negative.  Ambulates without difficulty.        We went over lumbar spine films showing some curvature in some lateral shift of vertebrae, degenerative disks.  Considerably abnormal spine films.  Suspicious at level L3-4.        Results for orders placed or performed in visit on 09/25/23   XR Lumbar Spine 2/3 Views     Status: None    Narrative    XR LUMBAR SPINE TWO-THREE VIEWS   9/25/2023 1:44 PM     HISTORY: Pain in right low back, radiates below knee, episodic; Acute  right-sided low back pain with right-sided sciatica    COMPARISON: None.      Impression    IMPRESSION: No fracture is identified. Multilevel moderate severe  degenerative disc disease and facet arthropathy. Levoconvex curvature.    CADEN CLARKE MD         SYSTEM ID:  [id number]

## 2023-09-27 NOTE — TELEPHONE ENCOUNTER
Records Requested     September 27, 2023 11:19 AM   [id number]   Facility  [facility]    Outcome 11:23 am Sent request for imaging to be pushed to PACS. -ADRIEN Villarreal on 10/9/2023 at 8:40 AM Imaging resolved into PACS. -ADRIEN     RECORDS RECEIVED FROM: Care Everywhere   REASON FOR VISIT: Acute right-sided low back pain with right-sided sciatica, Lumbar radiculopathy, Degeneration of lumbar or lumbosacral intervertebral disc   Date of Appt: 10/10/23 7:30 am    NOTES (FOR ALL VISITS) STATUS DETAILS   OFFICE NOTE from referring provider Internal  9/25/23 Jori Weir MD @[facility]     OFFICE NOTE from other specialist Care Everywhere 7[facility]10[facility]23 [facility]    7/13/21 (Transferred Recs) Jaelyn Conde PA-C @[facility]    5/24/21 Josee Carpenter MD @[facility]    5/12/21 [facility]       DISCHARGE REPORT from the ER Care Everywhere 5/18/21 Tim Frias MD  @[facility]     MEDICATION LIST Internal     IMAGING  (FOR ALL VISITS)     X-RAY PACS [facility]  9/25/23 XR Lumbar Spine  5/12/21 XR Thoracic Spine    [facility]  3/8/23 XR Thoracic Spine     MRI (HEAD, NECK, SPINE) PACS [facility]  5/18/21 MR Lumbar Spine  5/18/21  MR Thoracic Spine     CT (HEAD, NECK, SPINE) PACS [facility]  3/7/23 CT Thoracic Spine

## 2023-10-10 ENCOUNTER — PRE VISIT (OUTPATIENT)
Dept: NEUROSURGERY | Facility: CLINIC | Age: 73
End: 2023-10-10

## 2023-10-10 ENCOUNTER — ANCILLARY PROCEDURE (OUTPATIENT)
Dept: GENERAL RADIOLOGY | Facility: CLINIC | Age: 73
End: 2023-10-10
Attending: PHYSICIAN ASSISTANT
Payer: COMMERCIAL

## 2023-10-10 ENCOUNTER — OFFICE VISIT (OUTPATIENT)
Dept: NEUROSURGERY | Facility: CLINIC | Age: 73
End: 2023-10-10
Attending: FAMILY MEDICINE
Payer: COMMERCIAL

## 2023-10-10 VITALS
HEART RATE: 78 BPM | OXYGEN SATURATION: 97 % | DIASTOLIC BLOOD PRESSURE: 87 MMHG | BODY MASS INDEX: 35.49 KG/M2 | HEIGHT: 65 IN | SYSTOLIC BLOOD PRESSURE: 138 MMHG | WEIGHT: 213 LBS

## 2023-10-10 DIAGNOSIS — M54.41 ACUTE RIGHT-SIDED LOW BACK PAIN WITH RIGHT-SIDED SCIATICA: ICD-10-CM

## 2023-10-10 DIAGNOSIS — M51.379 DEGENERATION OF LUMBAR OR LUMBOSACRAL INTERVERTEBRAL DISC: ICD-10-CM

## 2023-10-10 DIAGNOSIS — M79.18 PIRIFORMIS MUSCLE PAIN: ICD-10-CM

## 2023-10-10 DIAGNOSIS — M70.61 TROCHANTERIC BURSITIS OF RIGHT HIP: Primary | ICD-10-CM

## 2023-10-10 DIAGNOSIS — M54.16 LUMBAR RADICULOPATHY: ICD-10-CM

## 2023-10-10 PROCEDURE — 99205 OFFICE O/P NEW HI 60 MIN: CPT | Performed by: PHYSICIAN ASSISTANT

## 2023-10-10 PROCEDURE — 72082 X-RAY EXAM ENTIRE SPI 2/3 VW: CPT | Performed by: STUDENT IN AN ORGANIZED HEALTH CARE EDUCATION/TRAINING PROGRAM

## 2023-10-10 PROCEDURE — 77073 BONE LENGTH STUDIES: CPT | Performed by: STUDENT IN AN ORGANIZED HEALTH CARE EDUCATION/TRAINING PROGRAM

## 2023-10-10 ASSESSMENT — PAIN SCALES - GENERAL: PAINLEVEL: NO PAIN (1)

## 2023-10-10 NOTE — PATIENT INSTRUCTIONS
EOS XR - today if able    PT referral placed.  They will call you to schedule.  If they do not reach you in the next couple of days, please call them.      Follow up in 4-6 weeks if your back pain gets worse or fails to improve.

## 2023-10-10 NOTE — PROGRESS NOTES
Neurosurgery Clinic Note    Chief Complaint: right-sided low back/buttock pain    History of Present Illness:  It was a pleasure to evaluate Karis Youssef in clinic today at the kind referral of   Jori Weir MD  [facility] [place] [place] [address].    Karis Youssef is a 73 year old female with a PMH of Sjogren's, severe osteoporosis, T9 compression fracture, right shoulder fx, right arm fx, left knee fx, nasal fx, lumbar DDD, right piriformis syndrome who is presenting for evaluation of right-sided low back/buttock pain.    Patient notes 1 week ago went to  b/c of issue (8/13/23) when going downstairs (old house w/ uneven steps) got a pain in her right glut the felt like tetany.  She denies radic into her leg.  She rubbed it and it let up.  She drove to [employer] that day w/o difficulty but it was sore from spine to glut on the right.  9/19/23 walking in [employer] had a similar cramp.  She needed to use a cart to hold onto and again had to massage it to get it to stop.  She had to cut her shopping short.  3 weeks ago (9/22) it woke her up at night when she was on her right side.  It feels like a huge cramp.  XR at  was concerning for a curvature and maybe L3/4.      She has hx of T9 fracture.  She had another left patella and right shoulder fx after tripping on a box this year in a store.  So all of her weight was on her right leg.  She did go through PT after the fracture.  She notes pain in the arch of her right foot. She uses tape to support her arch which has helped with this.  She still feels her gait is off since her knee injury.  Using her RN skills she does thinks this is likely her right SI joint.  She had piriformis syndrome a while ago and started doing her exercises for that (clam shell) and thought this might be contributing to her current pain so she stopped.  She also has a new mattress in the last year which causes her to ache a little more.      She sometimes gest cramping in her  shin/calf when she is not hydrated well which wakes her up at night.  The foot pain in the arch occurs 2-3 times per week that is burning sensation, but feels better with using a wrap.  This does not seem to be associated with the back pain.      She denies weakness or paresthesias in her legs.      Reclast x 1 year for severe osteoporosis followed by rheumatology.  She has shown bone growth in her lumbar spine on her DEXA scan per patient.    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   Enjoys being active, does gardening, raking of her roof in winter, snow blowing, cross country skiing, walking, etc.     Conservative Treatment:  Tylenol - helpful  Voltaren - helpful  Ibuprofen - does not take bc of GI upset  Bisphosphonate oral - [facility]    Review of Systems   See HPI    Past Medical History:   Diagnosis Date    Abnormal Pap smear 1995    had colpo 1995-hyperplasia, nl since    Arthritis     Chronic obstructive pulmonary disease (H)     Chronic obstructive pulmonary disease, unspecified COPD type (H)     Disturbance of skin sensation     Osteopenia     Osteopenia     Seasonal allergies     Sjogren's syndrome (H)        Past Surgical History:   Procedure Laterality Date    BIOPSY  2000    right breast lumpectomy    BREAST LUMPECTOMY, RT/LT      right breast, benign    CATARACT IOL, RT/LT      bilateral    COLONOSCOPY N/A 5/12/2023    Procedure: COLONOSCOPY, WITH POLYPECTOMY AND BIOPSY;  Surgeon: Geneva Newton DO;  Location:  OR    COLONOSCOPY N/A 5/12/2023    Procedure: COLONOSCOPY, FLEXIBLE, WITH LESION REMOVAL USING SNARE;  Surgeon: Geneva Newton DO;  Location: [facility] OR    COLONOSCOPY WITH CO2 INSUFFLATION N/A 5/12/2023    Procedure: Colonoscopy with CO2 insufflation;  Surgeon: Geneva Newton DO;  Location:  OR    ORTHOPEDIC SURGERY  01/2018    right radius        Social History     Socioeconomic History    Marital status:    Tobacco Use    Smoking status: Former     Packs/day: 0.00     Years:  0.00     Pack years: 0.00     Types: Cigarettes     Quit date: 1984     Years since quittin.8    Smokeless tobacco: Never    Tobacco comments:     nonsmoking hosuehold   Vaping Use    Vaping Use: Never used   Substance and Sexual Activity    Alcohol use: Yes     Comment: 1 glass of wine one/two times a week    Drug use: No    Sexual activity: Not Currently     Partners: Male   Other Topics Concern    Parent/sibling w/ CABG, MI or angioplasty before 65F 55M? No     Service No    Blood Transfusions No    Caffeine Concern No    Occupational Exposure Yes     Comment: nurse     Hobby Hazards Yes     Comment: gardening     Sleep Concern No    Stress Concern No    Weight Concern Yes     Comment: would like to lose 30 pounds    Special Diet Yes     Comment: no red meat, wheat, or sugar    Back Care No    Exercise Yes    Bike Helmet Yes    Seat Belt Yes    Self-Exams Yes     Social Determinants of Health     Financial Resource Strain: Low Risk  (2021)    Overall Financial Resource Strain (CARDIA)     Difficulty of Paying Living Expenses: Not hard at all   Food Insecurity: No Food Insecurity (2021)    Hunger Vital Sign     Worried About Running Out of Food in the Last Year: Never true     Ran Out of Food in the Last Year: Never true   Transportation Needs: No Transportation Needs (2021)    PRAPARE - Transportation     Lack of Transportation (Medical): No     Lack of Transportation (Non-Medical): No   Physical Activity: Sufficiently Active (2021)    Exercise Vital Sign     Days of Exercise per Week: 4 days     Minutes of Exercise per Session: 40 min   Stress: No Stress Concern Present (2021)    [employer] [place] [employer] - Occupational Stress Questionnaire     Feeling of Stress : Not at all   Social Connections: Socially Isolated (2021)    Social Connection and Isolation Panel [NHANES]     Frequency of Communication with Friends and Family: More than three times a  week     Frequency of Social Gatherings with Friends and Family: Once a week     Attends Confucianism Services: Never     Active Member of Clubs or Organizations: No     Marital Status:    Housing Stability: Low Risk  (2021)    Housing Stability Vital Sign     Unable to Pay for Housing in the Last Year: No     Number of Places Lived in the Last Year: 1     Unstable Housing in the Last Year: No       family history includes Breast Cancer (age of onset: 50) in her sister; C.A.D. in her mother; Cancer in her brother; Cancer (age of onset: 72) in her sister; Cerebrovascular Disease in her maternal grandmother and sister; Connective Tissue Disorder in an other family member; Coronary Artery Disease in her mother; Diabetes in her brother, brother, daughter, father, niece, paternal grandfather, sister, and sister; Heart Disease in her mother; Hyperlipidemia in her mother and sister; Hypertension in her father, mother, sister, and sister; Osteoporosis in her mother and sister; Other Cancer in her brother and sister; Rheumatoid Arthritis in her cousin; Thyroid Disease in her sister.       IMAGING   Imaging independently reviewed.    XR Lumbar Spine 2/3 Views  Result Date: 2023    XR LUMBAR SPINE TWO-THREE VIEWS   2023 1:44 PM HISTORY: Pain in right low back, radiates below knee, episodic; Acute right-sided low back pain with right-sided sciatica COMPARISON: None.   IMPRESSION: No fracture is identified. Multilevel moderate severe degenerative disc disease and facet arthropathy. Levoconvex curvature. CADEN CLARKE MD   SYSTEM ID:  [id number]    Dexa hip/pelvis/spine  Result Date: 2023  BONE DENSITOMETRY [address] [address] 2023   PATIENT: Karis Youssef CHART: [id number] :  1950 AGE:  73 year old SEX:  female REFERRING PROVIDER:  Finesse Saunders MD   PROCEDURE:  Bone density scanning was performed using DXA technology of the lumbar spine  and hip.  Scanning was performed on a Lunar Prodigy scanner.  Reporting is completed in the form of a T-score.  The T-score represents the standard deviation from peak bone mass based on a young healthy adult.   REFERENCE T-SCORES:     Normal                -1.0 and greater                               Osteopenia         Between -1.0 and -2.5                                         Osteoporosis     -2.5 and less                                   RISK FACTORS:  Post-menopausal, Height loss of 2 inches, Parent history of osteoporosis, Parent history of a hip fracture, Fractures of patella, shoulder,wrist, Multiple vertebral fractures, follow up severe osteoporosis CURRENT TREATMENT:  Calcium, Reclast (zoledronic acid), Vitamin D   FINDINGS:              Lumbar Spine (L1-L4)      T-score:  -0.3, marked degenerative changes present              Left Femoral Neck            T-score:  -1.9              Right Femoral Neck          T-score:  -1.3                         Lumbar (L1-L4) BMD: 1.140  Previous: 1.054                        Total Hip Mean BMD: 0.888  Previous: 0.857   Comparison is made to another DXA performed on the same Lunar Prodigy  machine on 6/11/2021.  IMPRESSION Severe osteoporosis on the basis of multiple vertebral fractures.  Degenerative changes at the lumbar spine may falsely elevate results. There has been significant increase in bone density of the lumbar spine. There has been no significant change in bone density of the hip(s). . Recommendations include ensuring adequate Calcium and Vitamin D. Follow up can be considered in 3 years. ___________________ Venus Holloway M.D. Electronically signed      XR KNEE 3 VIEWS LEFT  Result Date: 5/17/2023  For Patients:  As a result of the 21st Century Cures Act, medical imaging exams and procedure reports are released immediately into your electronic medical record.  You may view this report before your referring provider.   If you have questions, please  contact your health care provider. This radiology exam was performed at the [facility] Orthopedic Radiology Department in [facility] and interpreted by Dr. Cruz HISTORY: A 73 y.o. year-old female with left knee pain with history of trauma. TECHNICAL: 3 views of the left knee were obtained consisting of a weightbearing AP, Lateral and Sunrise. FINDINGS: X-rays of the left knee AP and lateral and sunrise view show nondisplaced patella fracture that appears to be healed without no displacement.  No signs of patellofemoral instability is seen  As noted in findings above. Reading services were personally performed by Dr. Cruz , documentation performed by Paula Branham LPN based on my observation of services performed and provider statements to me. Dr. Cruz 5/4/2023    XR Shoulder Right G/E 3 Views  Result Date: 5/17/2023  For Patients:  As a result of the 21st Century Cures Act, medical imaging exams and procedure reports are released immediately into your electronic medical record.  You may view this report before your referring provider.   If you have questions, please contact your health care provider. This radiology exam was performed at the [facility] Orthopedic Radiology Department in [facility] and interpreted by Dr. Cruz HISTORY: A 73 y.o. year-old female with right shoulder pain with history of trauma. TECHNICAL: 3 views of the right shoulder were obtained consisting of a standing AP, Grashey and Scap Y. FINDINGS: Xrays of the right shoulder 3 views show proximal humerus fracture involving the greater tuberosity.  When compared to previous x-rays no for displacement is seen.  Fracture appears to be healing in adequate position.  No glenohumeral instability.  As noted in findings above. Reading services were personally performed by Dr. Cruz , documentation performed by Paula Branham LPN based on my observation of services  performed and provider statements to me. Dr. Cruz 5/4/2023    XR SPINE THORACIC 3 VIEWS  Result Date: 3/8/2023    For Patients: As a result of the 21st Century Cures Act, medical imaging exams and procedure reports are released immediately into your electronic medical record. You may view this report before your referring provider. If you have questions, please contact your health care provider. EXAM: XR SPINE THORACIC 3 VIEWS LOCATION: [facility] DATE/TIME: 3/8/2023 3:59 PM INDICATION: Thoracic spine fracture COMPARISON: Thoracic spine plain films 07/13/2021 and thoracic spine CT 03/07/2023  Chronic 70% T9 compression fracture. Associated 20 degree segmental kyphosis. No gross coronal malalignment. No other vertebral body height loss. No change dating back to 07/13/2021    CT Thoracic Spine Reconstructed  Result Date: 3/7/2023    For Patients: As a result of the 21st Century Cures Act, medical imaging exams and procedure reports are released immediately into your electronic medical record. You may view this report before your referring provider. If you have questions, please contact your health care provider. EXAM: CT THORACIC SPINE 2D RECONSTRUCTION LOCATION: [facility] DATE/TIME: 3/7/2023 8:08 PM INDICATION: free text)->FALL COMPARISON: Thoracic spine radiograph 05/12/2021 TECHNIQUE: Routine CT Thoracic Spine without IV contrast. Multiplanar reformats. Dose reduction techniques were used. FINDINGS: VERTEBRA: Normal alignment of the thoracic spine. Age-indeterminate but possibly acute compression fracture at T9 with 70% height loss. There is 3 mm retropulsion. No associated spinal canal stenosis. There is corresponding moderately severe bilateral neural foraminal stenosis at T9-T10. Remaining vertebral body heights are maintained.   PARASPINAL: No extraspinal abnormality.  1.  Age-indeterminate but possibly acute T9 compression fracture with corresponding moderately severe neural foraminal  "stenosis at T9-T10.    Lumbar MRI 5/18/2021 -     IMPRESSION:   THORACIC SPINE MRI:   1.  Mild to moderate acute/subacute compression of T9 with approximately 40% vertebral body height loss. Minimal retropulsion at inferior aspect of the vertebral body without significant canal narrowing.   2.  No definite other fractures.   3.  Normal thoracic cord.   4.  Minimal to mild degenerative changes without significant canal or foraminal narrowing at any level.     Physical Exam   /87   Pulse 78   Ht 1.645 m (5' 4.76\")   Wt 96.6 kg (213 lb)   SpO2 97%   BMI 35.70 kg/m      Constitutional: Appears well-developed and well-nourished. Cooperative. No acute distress.   HENT:   Head: Normocephalic and atraumatic.   Eyes: Conjunctivae are normal.  Neck: Neck supple. No tracheal deviation present.  Cardiovascular: Normal rate and regular rhythm.    Pulmonary/Chest: Effort normal and breath sounds normal.  Abdominal: Exhibits no obvious distension.   Musculoskeletal: Able to move all extremities.  No involuntary motor movements. No C/T/L spine tenderness to palpation.  +right buttock TTP, GT bursa bilat TTP R>L.  Negative SI joint TTP.  RITO + on right.  Piriformis stretch neg. Negative compression, Gaenslen's.    Lumbar flexion/extension range of motion: F/E w/ mild right buttock discomfort.  Negative facet loading.  Skin: Skin is warm, dry and intact.   Psychiatric: Normal mood and affect. Speech is normal and behavior is normal.    Neurological:  Alert, NAD, and oriented to person, place, and time.   No cranial nerve deficit   Gait: wide gait, right foot instep    Strength (L/R)  5/5 Hip Flexion  5/5 Knee Extension  5/5 Ankle Dorsiflexion  5/5 Extensor Hallucis Longus  5/5 Plantar Flexion    Reflexes (L/R)  0/0 Patellar  1+/1+ Ankle    No ankle clonus    Sensation: SILT BLE       ASSESSMENT:  Karis Youssef is a 73 year old female with a PMH of Sjogren's, severe osteoporosis, T9 compression fracture, right shoulder " fx, right arm fx, left knee fx, nasal fx, lumbar DDD, right piriformis syndrome who is presenting for evaluation of right-sided low back/buttock pain.    Recent lumbar XR w/o fracture, but does show a curvature of her back.  It would be helpful to fully evaluate the curvature and alignment of her spine and get a look at leg length etc.  She may need foot insert for right foot.  She would also benefit from guided PT since her gait has changed and she is pretty tight through her pelvic girdle.  It will be important for her to have exercises to improve her gait and core strength given her severe osteoporosis and history of falling with fractures.      PLAN:    PT referral.    EOS XR - today if able.  Will review and add any additional referrals or imaging based on results.      Follow up in 4-6 weeks after having PT if still having back pain or worsening symptoms.      Continue to use topicals treatments and Tylenol as she is doing for pain.      I spent 71 minutes spent in patient care, independent review and interpretation of medical records/imaging, reviewing old records.      Sabiha Schwartz PA-C  Department of Neurosurgery  Office: [phone]

## 2023-10-10 NOTE — LETTER
10/10/2023       RE: Karis Youssef  [address] [facility] [address]  [place] [address]     Dear Colleague,    Thank you for referring your patient, Karis Youssef, to the [facility]UROSURGERY [facility] [place] at [facility]. Please see a copy of my visit note below.        Neurosurgery Clinic Note    Chief Complaint: right-sided low back/buttock pain    History of Present Illness:  It was a pleasure to evaluate Karis Youssef in clinic today at the kind referral of   Jori Weir MD  [address] [place] [address].    Karis Youssef is a 73 year old female with a PMH of Sjogren's, severe osteoporosis, T9 compression fracture, right shoulder fx, right arm fx, left knee fx, nasal fx, lumbar DDD, right piriformis syndrome who is presenting for evaluation of right-sided low back/buttock pain.    Patient notes 1 week ago went to  b/c of issue (8/13/23) when going downstairs (old house w/ uneven steps) got a pain in her right glut the felt like tetany.  She denies radic into her leg.  She rubbed it and it let up.  She [place] to [place] that [place] w/o difficulty but it was sore from spine to glut on the right.  9/19/23 walking in [employer] had a similar cramp.  She needed to use a cart to hold onto and again had to massage it to get it to stop.  She had to cut her shopping short.  3 weeks ago (9/22) it woke her up at night when she was on her right side.  It feels like a huge cramp.  XR at  was concerning for a curvature and maybe L3/4.      She has hx of T9 fracture.  She had another left patella and right shoulder fx after tripping on a box this year in a store.  So all of her weight was on her right leg.  She did go through PT after the fracture.  She notes pain in the arch of her right foot. She uses tape to support her arch which has helped with this.  She still feels her gait is off since her knee injury.  Using her RN skills she does thinks  this is likely her right SI joint.  She had piriformis syndrome a while ago and started doing her exercises for that (clam shell) and thought this might be contributing to her current pain so she stopped.  She also has a new mattress in the last year which causes her to ache a little more.      She sometimes gest cramping in her shin/calf when she is not hydrated well which wakes her up at night.  The foot pain in the arch occurs 2-3 times per week that is burning sensation, but feels better with using a wrap.  This does not seem to be associated with the back pain.      She denies weakness or paresthesias in her legs.      Reclast x 1 year for severe osteoporosis followed by rheumatology.  She has shown bone growth in her lumbar spine on her DEXA scan per patient.    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   Enjoys being active, does gardening, raking of her roof in winter, snow blowing, cross country skiing, walking, etc.     Conservative Treatment:  Tylenol - helpful  Voltaren - helpful  Ibuprofen - does not take bc of GI upset  Bisphosphonate oral - [facility]    Review of Systems   See HPI    Past Medical History:   Diagnosis Date    Abnormal Pap smear 1995    had colpo 1995-hyperplasia, nl since    Arthritis     Chronic obstructive pulmonary disease (H)     Chronic obstructive pulmonary disease, unspecified COPD type (H)     Disturbance of skin sensation     Osteopenia     Osteopenia     Seasonal allergies     Sjogren's syndrome (H)        Past Surgical History:   Procedure Laterality Date    BIOPSY  2000    right breast lumpectomy    BREAST LUMPECTOMY, RT/LT      right breast, benign    CATARACT IOL, RT/LT      bilateral    COLONOSCOPY N/A 5/12/2023    Procedure: COLONOSCOPY, WITH POLYPECTOMY AND BIOPSY;  Surgeon: Geneva Newton DO;  Location: [facility] OR    COLONOSCOPY N/A 5/12/2023    Procedure: COLONOSCOPY, FLEXIBLE, WITH LESION REMOVAL USING SNARE;  Surgeon: Geneva Newton DO;  Location:  OR     COLONOSCOPY WITH CO2 INSUFFLATION N/A 2023    Procedure: Colonoscopy with CO2 insufflation;  Surgeon: Geneva Newton DO;  Location: [facility] OR    ORTHOPEDIC SURGERY  2018    right radius        Social History     Socioeconomic History    Marital status:    Tobacco Use    Smoking status: Former     Packs/day: 0.00     Years: 0.00     Pack years: 0.00     Types: Cigarettes     Quit date: 1984     Years since quittin.8    Smokeless tobacco: Never    Tobacco comments:     nonsmoking hosuehold   Vaping Use    Vaping Use: Never used   Substance and Sexual Activity    Alcohol use: Yes     Comment: 1 glass of wine one/two times a week    Drug use: No    Sexual activity: Not Currently     Partners: Male   Other Topics Concern    Parent/sibling w/ CABG, MI or angioplasty before 65F 55M? No     Service No    Blood Transfusions No    Caffeine Concern No    Occupational Exposure Yes     Comment: nurse     Hobby Hazards Yes     Comment: gardening     Sleep Concern No    Stress Concern No    Weight Concern Yes     Comment: would like to lose 30 pounds    Special Diet Yes     Comment: no red meat, wheat, or sugar    Back Care No    Exercise Yes    Bike Helmet Yes    Seat Belt Yes    Self-Exams Yes     Social Determinants of Health     Financial Resource Strain: Low Risk  (2021)    Overall Financial Resource Strain (CARDIA)     Difficulty of Paying Living Expenses: Not hard at all   Food Insecurity: No Food Insecurity (2021)    Hunger Vital Sign     Worried About Running Out of Food in the Last Year: Never true     Ran Out of Food in the Last Year: Never true   Transportation Needs: No Transportation Needs (2021)    PRAPARE - Transportation     Lack of Transportation (Medical): No     Lack of Transportation (Non-Medical): No   Physical Activity: Sufficiently Active (2021)    Exercise Vital Sign     Days of Exercise per Week: 4 days     Minutes of Exercise per Session: 40 min    Stress: No Stress Concern Present (11/9/2021)    [facility] - Occupational Stress Questionnaire     Feeling of Stress : Not at all   Social Connections: Socially Isolated (11/9/2021)    Social Connection and Isolation Panel [NHANES]     Frequency of Communication with Friends and Family: More than three times a week     Frequency of Social Gatherings with Friends and Family: Once a week     Attends Orthodoxy Services: Never     Active Member of Clubs or Organizations: No     Marital Status:    Housing Stability: Low Risk  (11/9/2021)    Housing Stability Vital Sign     Unable to Pay for Housing in the Last Year: No     Number of Places Lived in the Last Year: 1     Unstable Housing in the Last Year: No       family history includes Breast Cancer (age of onset: 50) in her sister; C.A.D. in her mother; Cancer in her brother; Cancer (age of onset: 72) in her sister; Cerebrovascular Disease in her maternal grandmother and sister; Connective Tissue Disorder in an other family member; Coronary Artery Disease in her mother; Diabetes in her brother, brother, daughter, father, niece, paternal grandfather, sister, and sister; Heart Disease in her mother; Hyperlipidemia in her mother and sister; Hypertension in her father, mother, sister, and sister; Osteoporosis in her mother and sister; Other Cancer in her brother and sister; Rheumatoid Arthritis in her cousin; Thyroid Disease in her sister.       IMAGING   Imaging independently reviewed.    XR Lumbar Spine 2/3 Views  Result Date: 9/25/2023    XR LUMBAR SPINE TWO-THREE VIEWS   9/25/2023 1:44 PM HISTORY: Pain in right low back, radiates below knee, episodic; Acute right-sided low back pain with right-sided sciatica COMPARISON: None.   IMPRESSION: No fracture is identified. Multilevel moderate severe degenerative disc disease and facet arthropathy. Levoconvex curvature. CADEN CLARKE MD   SYSTEM ID:  [id number]    Dexa  hip/pelvis/spine  Result Date: 2023  BONE DENSITOMETRY [facility] [address] [place] [address] 2023   PATIENT: Karis Youssef CHART: [id number] :  1950 AGE:  73 year old SEX:  female REFERRING PROVIDER:  Finesse Saunders MD   PROCEDURE:  Bone density scanning was performed using DXA technology of the lumbar spine and hip.  Scanning was performed on a Lunar Prodigy scanner.  Reporting is completed in the form of a T-score.  The T-score represents the standard deviation from peak bone mass based on a young healthy adult.   REFERENCE T-SCORES:     Normal                -1.0 and greater                               Osteopenia         Between -1.0 and -2.5                                         Osteoporosis     -2.5 and less                                   RISK FACTORS:  Post-menopausal, Height loss of 2 inches, Parent history of osteoporosis, Parent history of a hip fracture, Fractures of patella, shoulder,wrist, Multiple vertebral fractures, follow up severe osteoporosis CURRENT TREATMENT:  Calcium, Reclast (zoledronic acid), Vitamin D   FINDINGS:              Lumbar Spine (L1-L4)      T-score:  -0.3, marked degenerative changes present              Left Femoral Neck            T-score:  -1.9              Right Femoral Neck          T-score:  -1.3                         Lumbar (L1-L4) BMD: 1.140  Previous: 1.054                        Total Hip Mean BMD: 0.888  Previous: 0.857   Comparison is made to another DXA performed on the same Lunar Prodigy  machine on 2021.  IMPRESSION Severe osteoporosis on the basis of multiple vertebral fractures.  Degenerative changes at the lumbar spine may falsely elevate results. There has been significant increase in bone density of the lumbar spine. There has been no significant change in bone density of the hip(s). . Recommendations include ensuring adequate Calcium and Vitamin D. Follow up can be considered in 3 years.  ___________________ Venus Holloway M.D. Electronically signed      XR KNEE 3 VIEWS LEFT  Result Date: 5/17/2023  For Patients:  As a result of the 21st Century Cures Act, medical imaging exams and procedure reports are released immediately into your electronic medical record.  You may view this report before your referring provider.   If you have questions, please contact your health care provider. This radiology exam was performed at the [facility] Orthopedic Radiology Department in [facility] and interpreted by Dr. Cruz HISTORY: A 73 y.o. year-old female with left knee pain with history of trauma. TECHNICAL: 3 views of the left knee were obtained consisting of a weightbearing AP, Lateral and Sunrise. FINDINGS: X-rays of the left knee AP and lateral and sunrise view show nondisplaced patella fracture that appears to be healed without no displacement.  No signs of patellofemoral instability is seen  As noted in findings above. Reading services were personally performed by Dr. Cruz , documentation performed by Paula Branham LPN based on my observation of services performed and provider statements to me. Dr. Cruz 5/4/2023    XR Shoulder Right G/E 3 Views  Result Date: 5/17/2023  For Patients:  As a result of the 21st Century Cures Act, medical imaging exams and procedure reports are released immediately into your electronic medical record.  You may view this report before your referring provider.   If you have questions, please contact your health care provider. This radiology exam was performed at the [facility] Orthopedic Radiology Department in [facility] and interpreted by Dr. Cruz HISTORY: A 73 y.o. year-old female with right shoulder pain with history of trauma. TECHNICAL: 3 views of the right shoulder were obtained consisting of a standing AP, Grashey and Scap Y. FINDINGS: Xrays of the right shoulder 3 views show proximal humerus  fracture involving the greater tuberosity.  When compared to previous x-rays no for displacement is seen.  Fracture appears to be healing in adequate position.  No glenohumeral instability.  As noted in findings above. Reading services were personally performed by Dr. Cruz , documentation performed by Paula Branham LPN based on my observation of services performed and provider statements to me. Dr. Cruz 5/4/2023    XR SPINE THORACIC 3 VIEWS  Result Date: 3/8/2023    For Patients: As a result of the 21st Century Cures Act, medical imaging exams and procedure reports are released immediately into your electronic medical record. You may view this report before your referring provider. If you have questions, please contact your health care provider. EXAM: XR SPINE THORACIC 3 VIEWS LOCATION: [facility] DATE/TIME: 3/8/2023 3:59 PM INDICATION: Thoracic spine fracture COMPARISON: Thoracic spine plain films 07/13/2021 and thoracic spine CT 03/07/2023  Chronic 70% T9 compression fracture. Associated 20 degree segmental kyphosis. No gross coronal malalignment. No other vertebral body height loss. No change dating back to 07/13/2021    CT Thoracic Spine Reconstructed  Result Date: 3/7/2023    For Patients: As a result of the 21st Century Cures Act, medical imaging exams and procedure reports are released immediately into your electronic medical record. You may view this report before your referring provider. If you have questions, please contact your health care provider. EXAM: CT THORACIC SPINE 2D RECONSTRUCTION LOCATION: [facility] DATE/TIME: 3/7/2023 8:08 PM INDICATION: free text)->FALL COMPARISON: Thoracic spine radiograph 05/12/2021 TECHNIQUE: Routine CT Thoracic Spine without IV contrast. Multiplanar reformats. Dose reduction techniques were used. FINDINGS: VERTEBRA: Normal alignment of the thoracic spine. Age-indeterminate but possibly acute compression fracture at T9 with 70% height loss.  "There is 3 mm retropulsion. No associated spinal canal stenosis. There is corresponding moderately severe bilateral neural foraminal stenosis at T9-T10. Remaining vertebral body heights are maintained.   PARASPINAL: No extraspinal abnormality.  1.  Age-indeterminate but possibly acute T9 compression fracture with corresponding moderately severe neural foraminal stenosis at T9-T10.    Lumbar MRI 5/18/2021 -     IMPRESSION:   THORACIC SPINE MRI:   1.  Mild to moderate acute/subacute compression of T9 with approximately 40% vertebral body height loss. Minimal retropulsion at inferior aspect of the vertebral body without significant canal narrowing.   2.  No definite other fractures.   3.  Normal thoracic cord.   4.  Minimal to mild degenerative changes without significant canal or foraminal narrowing at any level.     Physical Exam   /87   Pulse 78   Ht 1.645 m (5' 4.76\")   Wt 96.6 kg (213 lb)   SpO2 97%   BMI 35.70 kg/m      Constitutional: Appears well-developed and well-nourished. Cooperative. No acute distress.   HENT:   Head: Normocephalic and atraumatic.   Eyes: Conjunctivae are normal.  Neck: Neck supple. No tracheal deviation present.  Cardiovascular: Normal rate and regular rhythm.    Pulmonary/Chest: Effort normal and breath sounds normal.  Abdominal: Exhibits no obvious distension.   Musculoskeletal: Able to move all extremities.  No involuntary motor movements. No C/T/L spine tenderness to palpation.  +right buttock TTP, GT bursa bilat TTP R>L.  Negative SI joint TTP.  RITO + on right.  Piriformis stretch neg. Negative compression, Gaenslen's.    Lumbar flexion/extension range of motion: F/E w/ mild right buttock discomfort.  Negative facet loading.  Skin: Skin is warm, dry and intact.   Psychiatric: Normal mood and affect. Speech is normal and behavior is normal.    Neurological:  Alert, NAD, and oriented to person, place, and time.   No cranial nerve deficit   Gait: wide gait, right foot " instep    Strength (L/R)  5/5 Hip Flexion  5/5 Knee Extension  5/5 Ankle Dorsiflexion  5/5 Extensor Hallucis Longus  5/5 Plantar Flexion    Reflexes (L/R)  0/0 Patellar  1+/1+ Ankle    No ankle clonus    Sensation: SILT BLE       ASSESSMENT:  Karis Youssef is a 73 year old female with a PMH of Sjogren's, severe osteoporosis, T9 compression fracture, right shoulder fx, right arm fx, left knee fx, nasal fx, lumbar DDD, right piriformis syndrome who is presenting for evaluation of right-sided low back/buttock pain.    Recent lumbar XR w/o fracture, but does show a curvature of her back.  It would be helpful to fully evaluate the curvature and alignment of her spine and get a look at leg length etc.  She may need foot insert for right foot.  She would also benefit from guided PT since her gait has changed and she is pretty tight through her pelvic girdle.  It will be important for her to have exercises to improve her gait and core strength given her severe osteoporosis and history of falling with fractures.      PLAN:    PT referral.    EOS XR - today if able.  Will review and add any additional referrals or imaging based on results.      Follow up in 4-6 weeks after having PT if still having back pain or worsening symptoms.      Continue to use topicals treatments and Tylenol as she is doing for pain.      I spent 71 minutes spent in patient care, independent review and interpretation of medical records/imaging, reviewing old records.        Again, thank you for allowing me to participate in the care of your patient.      Sincerely,    Sabiha Schwartz PA-C

## 2023-10-18 ENCOUNTER — THERAPY VISIT (OUTPATIENT)
Dept: PHYSICAL THERAPY | Facility: CLINIC | Age: 73
End: 2023-10-18
Attending: PHYSICIAN ASSISTANT
Payer: COMMERCIAL

## 2023-10-18 DIAGNOSIS — M70.61 TROCHANTERIC BURSITIS OF RIGHT HIP: ICD-10-CM

## 2023-10-18 DIAGNOSIS — M25.551 HIP PAIN, RIGHT: ICD-10-CM

## 2023-10-18 DIAGNOSIS — R26.89 IMPAIRMENT OF BALANCE: ICD-10-CM

## 2023-10-18 DIAGNOSIS — M54.41 CHRONIC RIGHT-SIDED LOW BACK PAIN WITH RIGHT-SIDED SCIATICA: Primary | ICD-10-CM

## 2023-10-18 DIAGNOSIS — M51.379 DEGENERATION OF LUMBAR OR LUMBOSACRAL INTERVERTEBRAL DISC: ICD-10-CM

## 2023-10-18 DIAGNOSIS — M54.41 ACUTE RIGHT-SIDED LOW BACK PAIN WITH RIGHT-SIDED SCIATICA: ICD-10-CM

## 2023-10-18 DIAGNOSIS — M79.18 PIRIFORMIS MUSCLE PAIN: ICD-10-CM

## 2023-10-18 DIAGNOSIS — G89.29 CHRONIC RIGHT-SIDED LOW BACK PAIN WITH RIGHT-SIDED SCIATICA: Primary | ICD-10-CM

## 2023-10-18 PROCEDURE — 97110 THERAPEUTIC EXERCISES: CPT | Mod: GP | Performed by: PHYSICAL THERAPIST

## 2023-10-18 PROCEDURE — 97162 PT EVAL MOD COMPLEX 30 MIN: CPT | Mod: GP | Performed by: PHYSICAL THERAPIST

## 2023-10-18 NOTE — PROGRESS NOTES
PHYSICAL THERAPY EVALUATION  Type of Visit: Evaluation    See electronic medical record for Abuse and Falls Screening details.    Subjective       Presenting condition or subjective complaint: back pain and foot pain. History of a few falls mostly including trip and falls resulting in several injuries. Low back pain is sporadic but can travel into her right glute causing it cramp. She can trigger the pain by bending to the right often. She reports history of past piriformis issues. This same pain can wrap around into the lateral hip joint area.   Date of onset: 10/10/23 (chronic; date of order)    Relevant medical history:   PMH of Sjogren's, severe osteoporosis, T9 compression fracture, right shoulder fx, right arm fx, left knee fx, nasal fx, lumbar DDD, right piriformis syndrome. Reclast x 1 year for severe osteoporosis followed by rheumatology.  She has shown bone growth in her lumbar spine on her DEXA scan per patient.   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   Enjoys being active, does gardening, raking of her roof in winter, snow blowing, cross country skiing, walking, etc.    Patient goals for therapy: walk with less effort be more flexible    Pain assessment: See objective evaluation for additional pain details     Objective   LUMBAR SPINE EVALUATION  PAIN: Pain Level at Rest: 0/10  Pain Level with Use: hard to say  Pain Location: low back mostly  right sided, right glute area, lateral right hip, posterior right hip  Pain Quality: cramping, radiating, spasming  Pain Frequency: intermittent  Pain is Worst: activity dependent  Pain is Exacerbated By: stairs, walking, too much motion (fatigue)  Pain is Relieved By: rest  Pain Progression: Unchanged  INTEGUMENTARY (edema, incisions):   POSTURE:   GAIT:   Weightbearing Status:   Assistive Device(s):   Gait Deviations:   BALANCE/PROPRIOCEPTION:   WEIGHTBEARING ALIGNMENT:   NON-WEIGHTBEARING ALIGNMENT:    ROM:   (Degrees) Left AROM Left PROM  Right AROM Right PROM   Hip Flexion wnl  wnl    Hip Extension wnl  25% loss (fatigue)    Hip Abduction       Hip Adduction       Hip Internal Rotation wnl  Last 25% painful    Hip External Rotation wnl  Last 25% painful    Knee Flexion wnl  25% loss (joint pain)    Knee Extension wnl  wnl    Lumbar Side glide     Lumbar Flexion Mid tibia, some hamstring pulling   Lumbar Extension Min loss   Pain:   End feel:   PELVIC/SI SCREEN:   STRENGTH:  hip ext 4/5 L, 3+/5 R      MYOTOMES:    Left Right   T12-L3 (Hip Flexion) 5 5   L2-4 (Quads)  5 5   L4 (Ankle DF) 5 5   L5 (Great Toe Ext) 5 5   S1 (Toe Raise) 5 5     DTR S: 0/2 for knee extension and Achilles B  CORD SIGNS:   DERMATOMES: WNL  NEURAL TENSION: Lumbar WNL  FLEXIBILITY:   LUMBAR/HIP Special Tests:    PELVIS/SI SPECIAL TESTS:   FUNCTIONAL TESTS:   PALPATION:  glute med, piriformis, glute max B; hypertonic QL and ES bilaterally, R>L  SPINAL SEGMENTAL CONCLUSIONS:  not assessed      Assessment & Plan   CLINICAL IMPRESSIONS  Medical Diagnosis: Acute right-sided low back pain with right-sided sciatica    Treatment Diagnosis:     Impression/Assessment: Patient is a 73 year old female with low back and right hip complaints.  The following significant findings have been identified: Pain, Decreased ROM/flexibility, Decreased joint mobility, Decreased strength, Impaired muscle performance, Decreased activity tolerance, and Impaired  posture. These impairments interfere with their ability to perform recreational activities, household chores, and community mobility as compared to previous level of function.     Clinical Decision Making (Complexity):  Clinical Presentation: Evolving/Changing  Clinical Presentation Rationale: based on medical and personal factors listed in PT evaluation  Clinical Decision Making (Complexity): Moderate complexity    PLAN OF CARE  Treatment Interventions:  Interventions: Manual Therapy, Neuromuscular Re-education, Therapeutic Activity, Therapeutic Exercise    Long Term Goals     PT Goal 1  Goal Identifier: LTG  Goal Description: Patient will be able to stand for 2 hours with 2/10 back at hip pain at worst  Rationale: to maximize safety and independence with performance of ADLs and functional tasks;to maximize safety and independence within the home  Target Date: 12/13/23      Frequency of Treatment: 1x/week  Duration of Treatment: 12 weeks    Recommended Referrals to Other Professionals:  none  Education Assessment:   Learner/Method: No Barriers to Learning;Pictures/Video;Demonstration;Reading;Listening;Patient    Risks and benefits of evaluation/treatment have been explained.   Patient/Family/caregiver agrees with Plan of Care.     Evaluation Time:     PT Eval, Moderate Complexity Minutes (31002): 25       Signing Clinician: Mj Ann, PT      [facility]                                                                                   OUTPATIENT PHYSICAL THERAPY      PLAN OF TREATMENT FOR OUTPATIENT REHABILITATION   Patient's Last Name, First Name, Karis Raygoza YOB: 1950   Provider's Name   [facility]   Medical Record No.  [id number]     Onset Date: 10/10/23 (chronic; date of order)  Start of Care Date: 10/18/23     Medical Diagnosis:  Acute right-sided low back pain with right-sided sciatica      PT Treatment Diagnosis:     Plan of Treatment  Frequency/Duration: 1x/week/ 12 weeks    Certification date from 10/18/23 to 01/10/24         See note for plan of treatment details and functional goals     Mj Ann, PT                         I CERTIFY THE NEED FOR THESE SERVICES FURNISHED UNDER        THIS PLAN OF TREATMENT AND WHILE UNDER MY CARE     (Physician attestation of this document indicates review and certification of the therapy plan).                Referring Provider:  Sabiha Schwartz      Initial Assessment  See Epic Evaluation- Start of Care Date: 10/18/23

## 2023-11-07 ENCOUNTER — TELEPHONE (OUTPATIENT)
Dept: ORTHOPEDICS | Facility: CLINIC | Age: 73
End: 2023-11-07
Payer: COMMERCIAL

## 2023-11-07 NOTE — TELEPHONE ENCOUNTER
Talked with Karis about upcoming appt with Dr. Barrow. Karis thought she was seeing a podiatrist and would like to schedule with a podiatrist. Karis was given CS number and Dr. Browning and Dr. Stover for reference.  Karis would like her upcoming appt with Dr. Barrow cancelled as she reschedules.  Gerardo Gonzales, LAT, ATC

## 2023-11-10 ENCOUNTER — THERAPY VISIT (OUTPATIENT)
Dept: PHYSICAL THERAPY | Facility: CLINIC | Age: 73
End: 2023-11-10
Payer: COMMERCIAL

## 2023-11-10 DIAGNOSIS — M54.41 CHRONIC RIGHT-SIDED LOW BACK PAIN WITH RIGHT-SIDED SCIATICA: Primary | ICD-10-CM

## 2023-11-10 DIAGNOSIS — G89.29 CHRONIC RIGHT-SIDED LOW BACK PAIN WITH RIGHT-SIDED SCIATICA: Primary | ICD-10-CM

## 2023-11-10 DIAGNOSIS — M25.551 HIP PAIN, RIGHT: ICD-10-CM

## 2023-11-10 DIAGNOSIS — R26.89 IMPAIRMENT OF BALANCE: ICD-10-CM

## 2023-11-10 PROCEDURE — 97110 THERAPEUTIC EXERCISES: CPT | Mod: GP | Performed by: PHYSICAL THERAPIST

## 2023-11-10 PROCEDURE — 97530 THERAPEUTIC ACTIVITIES: CPT | Mod: GP | Performed by: PHYSICAL THERAPIST

## 2023-11-10 PROCEDURE — 97112 NEUROMUSCULAR REEDUCATION: CPT | Mod: GP | Performed by: PHYSICAL THERAPIST

## 2023-11-17 ENCOUNTER — THERAPY VISIT (OUTPATIENT)
Dept: PHYSICAL THERAPY | Facility: CLINIC | Age: 73
End: 2023-11-17
Payer: COMMERCIAL

## 2023-11-17 DIAGNOSIS — G89.29 CHRONIC RIGHT-SIDED LOW BACK PAIN WITH RIGHT-SIDED SCIATICA: Primary | ICD-10-CM

## 2023-11-17 DIAGNOSIS — M25.551 HIP PAIN, RIGHT: ICD-10-CM

## 2023-11-17 DIAGNOSIS — M54.41 CHRONIC RIGHT-SIDED LOW BACK PAIN WITH RIGHT-SIDED SCIATICA: Primary | ICD-10-CM

## 2023-11-17 DIAGNOSIS — R26.89 IMPAIRMENT OF BALANCE: ICD-10-CM

## 2023-11-17 PROCEDURE — 97112 NEUROMUSCULAR REEDUCATION: CPT | Mod: GP | Performed by: PHYSICAL THERAPIST

## 2023-11-17 PROCEDURE — 97110 THERAPEUTIC EXERCISES: CPT | Mod: GP | Performed by: PHYSICAL THERAPIST

## 2023-11-24 ENCOUNTER — THERAPY VISIT (OUTPATIENT)
Dept: PHYSICAL THERAPY | Facility: CLINIC | Age: 73
End: 2023-11-24
Payer: COMMERCIAL

## 2023-11-24 DIAGNOSIS — R26.89 IMPAIRMENT OF BALANCE: ICD-10-CM

## 2023-11-24 DIAGNOSIS — M54.41 CHRONIC RIGHT-SIDED LOW BACK PAIN WITH RIGHT-SIDED SCIATICA: Primary | ICD-10-CM

## 2023-11-24 DIAGNOSIS — M25.551 HIP PAIN, RIGHT: ICD-10-CM

## 2023-11-24 DIAGNOSIS — G89.29 CHRONIC RIGHT-SIDED LOW BACK PAIN WITH RIGHT-SIDED SCIATICA: Primary | ICD-10-CM

## 2023-11-24 PROCEDURE — 97110 THERAPEUTIC EXERCISES: CPT | Mod: GP | Performed by: PHYSICAL THERAPIST

## 2023-11-24 PROCEDURE — 97112 NEUROMUSCULAR REEDUCATION: CPT | Mod: GP | Performed by: PHYSICAL THERAPIST

## 2023-11-27 ENCOUNTER — ANCILLARY PROCEDURE (OUTPATIENT)
Dept: GENERAL RADIOLOGY | Facility: CLINIC | Age: 73
End: 2023-11-27
Attending: PODIATRIST
Payer: COMMERCIAL

## 2023-11-27 ENCOUNTER — OFFICE VISIT (OUTPATIENT)
Dept: PODIATRY | Facility: CLINIC | Age: 73
End: 2023-11-27
Payer: COMMERCIAL

## 2023-11-27 VITALS
BODY MASS INDEX: 35.7 KG/M2 | DIASTOLIC BLOOD PRESSURE: 85 MMHG | SYSTOLIC BLOOD PRESSURE: 152 MMHG | HEART RATE: 85 BPM | WEIGHT: 213 LBS

## 2023-11-27 DIAGNOSIS — M79.671 PAIN IN RIGHT FOOT: ICD-10-CM

## 2023-11-27 DIAGNOSIS — M79.671 PAIN IN RIGHT FOOT: Primary | ICD-10-CM

## 2023-11-27 DIAGNOSIS — M76.811 ANTERIOR TIBIALIS TENDINITIS OF RIGHT LEG: ICD-10-CM

## 2023-11-27 DIAGNOSIS — M19.079 ARTHRITIS OF FOOT: ICD-10-CM

## 2023-11-27 DIAGNOSIS — M35.00 SJOGREN'S SYNDROME, WITH UNSPECIFIED ORGAN INVOLVEMENT (H): ICD-10-CM

## 2023-11-27 PROCEDURE — 73630 X-RAY EXAM OF FOOT: CPT | Mod: TC | Performed by: RADIOLOGY

## 2023-11-27 PROCEDURE — 99203 OFFICE O/P NEW LOW 30 MIN: CPT | Performed by: PODIATRIST

## 2023-11-27 NOTE — PROGRESS NOTES
S:  Complains of right foot pain.  Points to anterior tibial tendon where is courses around the medial cuneiform.  Has had this for 4 months.  Describes it as a burning pain.  Aggrevated by activity and relieved by rest.  She is retired.  She has Sjogren's syndrome.  Recently physician told her to wear shoe inside the house.  She has been doing this.  Is starting to feeling much better.  Denies weakness ecchymosis numbness or erythema.  Also complains of a bump on the dorsal central portion left foot.  No pain.    ROS: See above         Allergies   Allergen Reactions    Erythromycin Nausea     Tongue turned black.    Nsaids GI Disturbance     Gastritis          Current Outpatient Medications   Medication Sig Dispense Refill    Calcium Carbonate-Vitamin D (CALCIUM 600 + D OR) Take 1,200 mg by mouth daily       carboxymethylcellulose (CELLUVISC/REFRESH LIQUIGEL) 1 % ophthalmic solution Place 1 drop into both eyes 3 times daily      cetirizine (ZYRTEC) 10 MG tablet Take 10 mg by mouth daily      diclofenac (VOLTAREN) 1 % topical gel Place onto the skin 4 times daily 100 g 6    MAGNESIUM PO Take 250 mg by mouth daily       Multiple Vitamins-Minerals (ICAPS AREDS 2 PO) Take 1 tablet by mouth 2 times daily      Polypodium Leucotomos (HELIOCARE) 240 MG CAPS       SUDAFED 30 MG OR TABS 1 TABLET EVERY 4 TO 6 HOURS AS NEEDED      VITAMIN C 500 MG OR TABS       Vitamin D, Cholecalciferol, 25 MCG (1000 UT) TABS daily       VITAMIN E 400 UNIT PO CAPS       zoledronic Acid (RECLAST) 5 MG/100ML SOLN infusion          Patient Active Problem List   Diagnosis    Thigh pain    Osteopenia    Sjogren's syndrome (H24)    CARDIOVASCULAR SCREENING; LDL GOAL LESS THAN 160    Advanced directives, counseling/discussion    Palpitations    Acute conjunctivitis    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    Choroidal nevus, left    Acute pain of left shoulder    Displaced fracture of neck of right radius    Compression fracture of T9 vertebra,  initial encounter (H)    Age-related osteoporosis with current pathological fracture with routine healing    Chronic right-sided low back pain with right-sided sciatica    Hip pain, right    Impairment of balance       Past Medical History:   Diagnosis Date    Abnormal Pap smear 1995    had colpo 1995-hyperplasia, nl since    Arthritis     Chronic obstructive pulmonary disease (H)     Chronic obstructive pulmonary disease, unspecified COPD type (H)     Disturbance of skin sensation     Osteopenia     Osteopenia     Seasonal allergies     Sjogren's syndrome (H)        Past Surgical History:   Procedure Laterality Date    BIOPSY  2000    right breast lumpectomy    BREAST LUMPECTOMY, RT/LT      right breast, benign    CATARACT IOL, RT/LT      bilateral    COLONOSCOPY N/A 5/12/2023    Procedure: COLONOSCOPY, WITH POLYPECTOMY AND BIOPSY;  Surgeon: Geneva Newton DO;  Location: [facility]    COLONOSCOPY N/A 5/12/2023    Procedure: COLONOSCOPY, FLEXIBLE, WITH LESION REMOVAL USING SNARE;  Surgeon: Geneva Newton DO;  Location: [facility]    COLONOSCOPY WITH CO2 INSUFFLATION N/A 5/12/2023    Procedure: Colonoscopy with CO2 insufflation;  Surgeon: Geneva Newton DO;  Location: [facility]    ORTHOPEDIC SURGERY  01/2018    right radius        Family History   Problem Relation Age of Onset    C.A.D. Mother     Osteoporosis Mother     Hypertension Mother     Heart Disease Mother         CAD, CHF    Coronary Artery Disease Mother     Hyperlipidemia Mother     Diabetes Father     Hypertension Father     Cancer Brother         liver    Diabetes Brother     Diabetes Sister     Breast Cancer Sister 50    Hypertension Sister     Cancer Sister 72        non-hodgkins lymphoma    Cerebrovascular Disease Maternal Grandmother     Diabetes Paternal Grandfather     Diabetes Daughter         type 2    Connective Tissue Disorder Other         2 cousins and niece with MS; aunt with lupus    Rheumatoid Arthritis Cousin     Diabetes Niece      Hyperlipidemia Sister     Other Cancer Brother         Liver    Diabetes Brother     Other Cancer Sister         non-hodgkins lymphoma    Thyroid Disease Sister     Diabetes Sister     Hypertension Sister     Osteoporosis Sister     Cerebrovascular Disease Sister        Social History     Tobacco Use    Smoking status: Former     Packs/day: 0.00     Years: 0.00     Additional pack years: 0.00     Total pack years: 0.00     Types: Cigarettes     Quit date: 1984     Years since quittin.9    Smokeless tobacco: Never    Tobacco comments:     nonsmoking hosuehold   Substance Use Topics    Alcohol use: Yes     Comment: 1 glass of wine one/two times a week         Exam:    Vitals: BP (!) 152/85   Pulse 85   Wt 96.6 kg (213 lb)   BMI 35.70 kg/m    BMI: Body mass index is 35.7 kg/m .  Height: Data Unavailable    Constitutional/ general:  Pt is in no apparent distress, appears well-nourished.  Cooperative with history and physical exam.     Psych:  The patient answered questions appropriately.  Normal affect.  Seems to have reasonable expectations, in terms of treatment.     Lungs:  Non labored breathing, non labored speech. No cough.  No audible wheezing. Even, quiet breathing.       Vascular:  positive pedal pulses bilaterally for both the DP and PT arteries.  CFT < 3 sec.  positive ankle edema.  positive pedal hair growth.    Neuro:  Alert and oriented x 3. Coordinated gait.  Light touch sensation is intact     Derm: Normal texture and turgor.  No erythema, ecchymosis, or cyanosis.      Musculoskeletal:     Patient is ambulatory without an assistive device or brace.  Somewhat cavus arch with weightbearing.  No forefoot or rear foot deformities noted.  MS 5/5 all compartments.  Normal ROM all fore foot and rearfoot joints.  No equinus.   Pain with palpation over tibialis anterior tendon medial to medial cunieform.   Pain with stressing anterior tibial tendon.    no erythema edema or ecchymosis or masses noted.   Bossing noted bilateral second tarsometatarsal joints.    Radiographic Exam:  X-Ray Findings:  I personally reviewed the films.  Arthritis second tarsometatarsal joints.  Otherwise unremarkable    A:    Right  tibialis anterior tendonitis  Bilateral second tarsometatarsal joint arthritis    P:  Xrays today.  Discussed cause of tendinitis.  She is already much better.  She will try an over-the-counter arch support.  Continue compression.  She can also ice this.  Avoid activities that bother this and discussed will bother this.  If not better discussed ankle brace or cam walker.  Discussed she has arthritis of second tarsometatarsal joints.  Good supportive shoes to keep this quiescent.  RTC as needed.    Miguel Browning, VIRGIL, FACFAS

## 2023-11-27 NOTE — LETTER
11/27/2023         RE: Karis Youssef  [address]  [place] [address]        Dear Colleague,    Thank you for referring your patient, Karis Youssef, to the [facility]. Please see a copy of my visit note below.    S:  Complains of right foot pain.  Points to anterior tibial tendon where is courses around the medial cuneiform.  Has had this for 4 months.  Describes it as a burning pain.  Aggrevated by activity and relieved by rest.  She is retired.  She has Sjogren's syndrome.  Recently physician told her to wear shoe inside the house.  She has been doing this.  Is starting to feeling much better.  Denies weakness ecchymosis numbness or erythema.  Also complains of a bump on the dorsal central portion left foot.  No pain.    ROS: See above         Allergies   Allergen Reactions     Erythromycin Nausea     Tongue turned black.     Nsaids GI Disturbance     Gastritis          Current Outpatient Medications   Medication Sig Dispense Refill     Calcium Carbonate-Vitamin D (CALCIUM 600 + D OR) Take 1,200 mg by mouth daily        carboxymethylcellulose (CELLUVISC/REFRESH LIQUIGEL) 1 % ophthalmic solution Place 1 drop into both eyes 3 times daily       cetirizine (ZYRTEC) 10 MG tablet Take 10 mg by mouth daily       diclofenac (VOLTAREN) 1 % topical gel Place onto the skin 4 times daily 100 g 6     MAGNESIUM PO Take 250 mg by mouth daily        Multiple Vitamins-Minerals (ICAPS AREDS 2 PO) Take 1 tablet by mouth 2 times daily       Polypodium Leucotomos (HELIOCARE) 240 MG CAPS        SUDAFED 30 MG OR TABS 1 TABLET EVERY 4 TO 6 HOURS AS NEEDED       VITAMIN C 500 MG OR TABS        Vitamin D, Cholecalciferol, 25 MCG (1000 UT) TABS daily        VITAMIN E 400 UNIT PO CAPS        zoledronic Acid (RECLAST) 5 MG/100ML SOLN infusion          Patient Active Problem List   Diagnosis     Thigh pain     Osteopenia     Sjogren's syndrome (H24)     CARDIOVASCULAR SCREENING; LDL GOAL LESS THAN  160     Advanced directives, counseling/discussion     Palpitations     Acute conjunctivitis     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     Choroidal nevus, left     Acute pain of left shoulder     Displaced fracture of neck of right radius     Compression fracture of T9 vertebra, initial encounter (H)     Age-related osteoporosis with current pathological fracture with routine healing     Chronic right-sided low back pain with right-sided sciatica     Hip pain, right     Impairment of balance       Past Medical History:   Diagnosis Date     Abnormal Pap smear 1995    had colpo 1995-hyperplasia, nl since     Arthritis      Chronic obstructive pulmonary disease (H)      Chronic obstructive pulmonary disease, unspecified COPD type (H)      Disturbance of skin sensation      Osteopenia      Osteopenia      Seasonal allergies      Sjogren's syndrome (H)        Past Surgical History:   Procedure Laterality Date     BIOPSY  2000    right breast lumpectomy     BREAST LUMPECTOMY, RT/LT      right breast, benign     CATARACT IOL, RT/LT      bilateral     COLONOSCOPY N/A 5/12/2023    Procedure: COLONOSCOPY, WITH POLYPECTOMY AND BIOPSY;  Surgeon: Geneva Newton DO;  Location: [facility]     COLONOSCOPY N/A 5/12/2023    Procedure: COLONOSCOPY, FLEXIBLE, WITH LESION REMOVAL USING SNARE;  Surgeon: Geneva Newton DO;  Location: [facility]     COLONOSCOPY WITH CO2 INSUFFLATION N/A 5/12/2023    Procedure: Colonoscopy with CO2 insufflation;  Surgeon: Geneva Newton DO;  Location: [facility]     ORTHOPEDIC SURGERY  01/2018    right radius        Family History   Problem Relation Age of Onset     C.A.D. Mother      Osteoporosis Mother      Hypertension Mother      Heart Disease Mother         CAD, CHF     Coronary Artery Disease Mother      Hyperlipidemia Mother      Diabetes Father      Hypertension Father      Cancer Brother         liver     Diabetes Brother      Diabetes Sister      Breast Cancer Sister 50     Hypertension Sister       Cancer Sister 72        non-hodgkins lymphoma     Cerebrovascular Disease Maternal Grandmother      Diabetes Paternal Grandfather      Diabetes Daughter         type 2     Connective Tissue Disorder Other         2 cousins and niece with MS; aunt with lupus     Rheumatoid Arthritis Cousin      Diabetes Niece      Hyperlipidemia Sister      Other Cancer Brother         Liver     Diabetes Brother      Other Cancer Sister         non-hodgkins lymphoma     Thyroid Disease Sister      Diabetes Sister      Hypertension Sister      Osteoporosis Sister      Cerebrovascular Disease Sister        Social History     Tobacco Use     Smoking status: Former     Packs/day: 0.00     Years: 0.00     Additional pack years: 0.00     Total pack years: 0.00     Types: Cigarettes     Quit date: 1984     Years since quittin.9     Smokeless tobacco: Never     Tobacco comments:     nonsmoking hosuehold   Substance Use Topics     Alcohol use: Yes     Comment: 1 glass of wine one/two times a week         Exam:    Vitals: BP (!) 152/85   Pulse 85   Wt 96.6 kg (213 lb)   BMI 35.70 kg/m    BMI: Body mass index is 35.7 kg/m .  Height: Data Unavailable    Constitutional/ general:  Pt is in no apparent distress, appears well-nourished.  Cooperative with history and physical exam.     Psych:  The patient answered questions appropriately.  Normal affect.  Seems to have reasonable expectations, in terms of treatment.     Lungs:  Non labored breathing, non labored speech. No cough.  No audible wheezing. Even, quiet breathing.       Vascular:  positive pedal pulses bilaterally for both the DP and PT arteries.  CFT < 3 sec.  positive ankle edema.  positive pedal hair growth.    Neuro:  Alert and oriented x 3. Coordinated gait.  Light touch sensation is intact     Derm: Normal texture and turgor.  No erythema, ecchymosis, or cyanosis.      Musculoskeletal:     Patient is ambulatory without an assistive device or brace.  Somewhat cavus arch  with weightbearing.  No forefoot or rear foot deformities noted.  MS 5/5 all compartments.  Normal ROM all fore foot and rearfoot joints.  No equinus.   Pain with palpation over tibialis anterior tendon medial to medial cunieform.   Pain with stressing anterior tibial tendon.    no erythema edema or ecchymosis or masses noted.  Bossing noted bilateral second tarsometatarsal joints.    Radiographic Exam:  X-Ray Findings:  I personally reviewed the films.  Arthritis second tarsometatarsal joints.  Otherwise unremarkable    A:    Right  tibialis anterior tendonitis  Bilateral second tarsometatarsal joint arthritis    P:  Xrays today.  Discussed cause of tendinitis.  She is already much better.  She will try an over-the-counter arch support.  Continue compression.  She can also ice this.  Avoid activities that bother this and discussed will bother this.  If not better discussed ankle brace or cam walker.  Discussed she has arthritis of second tarsometatarsal joints.  Good supportive shoes to keep this quiescent.  RTC as needed.    Miguel Browning DPM, FACFAS         Again, thank you for allowing me to participate in the care of your patient.        Sincerely,        Miguel Browning DPM

## 2023-12-08 ENCOUNTER — PATIENT OUTREACH (OUTPATIENT)
Dept: CARE COORDINATION | Facility: CLINIC | Age: 73
End: 2023-12-08
Payer: COMMERCIAL

## 2024-01-05 ENCOUNTER — OFFICE VISIT (OUTPATIENT)
Dept: OPHTHALMOLOGY | Facility: CLINIC | Age: 74
End: 2024-01-05
Payer: COMMERCIAL

## 2024-01-05 DIAGNOSIS — H52.223 MYOPIA OF BOTH EYES WITH REGULAR ASTIGMATISM AND PRESBYOPIA: ICD-10-CM

## 2024-01-05 DIAGNOSIS — H43.811 POSTERIOR VITREOUS DETACHMENT OF RIGHT EYE: ICD-10-CM

## 2024-01-05 DIAGNOSIS — H52.4 MYOPIA OF BOTH EYES WITH REGULAR ASTIGMATISM AND PRESBYOPIA: ICD-10-CM

## 2024-01-05 DIAGNOSIS — Z96.1 PSEUDOPHAKIA: ICD-10-CM

## 2024-01-05 DIAGNOSIS — H52.13 MYOPIA OF BOTH EYES WITH REGULAR ASTIGMATISM AND PRESBYOPIA: ICD-10-CM

## 2024-01-05 DIAGNOSIS — Z01.01 ENCOUNTER FOR EXAMINATION OF EYES AND VISION WITH ABNORMAL FINDINGS: Primary | ICD-10-CM

## 2024-01-05 DIAGNOSIS — D31.32 CHOROIDAL NEVUS OF LEFT EYE: ICD-10-CM

## 2024-01-05 PROCEDURE — 92015 DETERMINE REFRACTIVE STATE: CPT | Performed by: STUDENT IN AN ORGANIZED HEALTH CARE EDUCATION/TRAINING PROGRAM

## 2024-01-05 PROCEDURE — 92014 COMPRE OPH EXAM EST PT 1/>: CPT | Performed by: STUDENT IN AN ORGANIZED HEALTH CARE EDUCATION/TRAINING PROGRAM

## 2024-01-05 ASSESSMENT — REFRACTION_MANIFEST
OD_ADD: +2.50
OD_CYLINDER: +0.50
OS_AXIS: 012
OD_SPHERE: -0.50
OD_AXIS: 171
OS_ADD: +2.50
OS_SPHERE: -0.75
OS_CYLINDER: +0.75

## 2024-01-05 ASSESSMENT — VISUAL ACUITY
OD_SC+: +1
OS_SC: 20/30
OS_SC+: +2
METHOD: SNELLEN - LINEAR
OD_SC: 20/25

## 2024-01-05 ASSESSMENT — CONF VISUAL FIELD
OS_SUPERIOR_NASAL_RESTRICTION: 0
OS_NORMAL: 1
OD_INFERIOR_TEMPORAL_RESTRICTION: 0
OS_SUPERIOR_TEMPORAL_RESTRICTION: 0
OD_NORMAL: 1
OS_INFERIOR_TEMPORAL_RESTRICTION: 0
OD_SUPERIOR_TEMPORAL_RESTRICTION: 0
OS_INFERIOR_NASAL_RESTRICTION: 0
OD_SUPERIOR_NASAL_RESTRICTION: 0
OD_INFERIOR_NASAL_RESTRICTION: 0

## 2024-01-05 ASSESSMENT — EXTERNAL EXAM - LEFT EYE: OS_EXAM: NORMAL

## 2024-01-05 ASSESSMENT — CUP TO DISC RATIO
OD_RATIO: 0.25
OS_RATIO: 0.2

## 2024-01-05 ASSESSMENT — TONOMETRY
IOP_METHOD: APPLANATION
OD_IOP_MMHG: 17
OS_IOP_MMHG: 17

## 2024-01-05 ASSESSMENT — EXTERNAL EXAM - RIGHT EYE: OD_EXAM: NORMAL

## 2024-01-05 ASSESSMENT — SLIT LAMP EXAM - LIDS
COMMENTS: NORMAL
COMMENTS: NORMAL

## 2024-01-05 NOTE — LETTER
"    1/5/2024         RE: Karis Youssef  [address] [place] [place] [address]        Dear Colleague,    Thank you for referring your patient, Karis Youssef, to the [facility]. Please see a copy of my visit note below.     Current Eye Medications:  AREDS2 daily. Refresh drops several times a day.      Subjective:  Comprehensive Eye Exam.  She wears distance glasses for night driving, but only wears them on a rare occasion.  She has noticed a progression of slightly decreased vision in her left eye.  Distance vision in her right eye is good, without correction.  She is wondering if she needs a Yag Capsulotomy, left eye.  She wears +1.25 or +1.50 over-the-counter readers which work adequately.      Last visit with Dr. Flores was about 6 months ago.  Next appointment is in February.      Objective:  See Ophthalmology Exam.       Assessment:  Karis Youssef is a 73 year old female who presents with:   Encounter Diagnoses   Name Primary?     Encounter for examination of eyes and vision with abnormal findings Yes     Pseudophakia - Both Eyes      Posterior vitreous detachment of right eye      Choroidal nevus of left eye      Myopia of both eyes with regular astigmatism and presbyopia        Plan:  Continue artificial tears up to four times a day as needed (Refresh Optive, Systane Balance, or TheraTears. Avoid generic artificial tears or \"get the red out\" drops).      Continue AREDS2 eye vitamins by mouth    Continue care with Dr. Flores to monitor choroidal nevus left eye     Recommend YAG capsulotomy (laser to help clear the vision) in the left eye at your convenience.     Tiffanie Leroy MD  [phone]     Again, thank you for allowing me to participate in the care of your patient.        Sincerely,        Tiffanie Leroy MD  "

## 2024-01-05 NOTE — PATIENT INSTRUCTIONS
"Continue artificial tears up to four times a day as needed (Refresh Optive, Systane Balance, or TheraTears. Avoid generic artificial tears or \"get the red out\" drops).      Continue AREDS2 eye vitamins by mouth    Continue care with Dr. Flores to monitor choroidal nevus left eye     Recommend YAG capsulotomy (laser to help clear the vision) in the left eye at your convenience.     Tiffanie Leroy MD  [phone]   "

## 2024-01-18 ENCOUNTER — DOCUMENTATION ONLY (OUTPATIENT)
Dept: OPHTHALMOLOGY | Facility: CLINIC | Age: 74
End: 2024-01-18
Payer: COMMERCIAL

## 2024-01-22 ASSESSMENT — ENCOUNTER SYMPTOMS
PALPITATIONS: 0
ABDOMINAL PAIN: 0
MYALGIAS: 0
BREAST MASS: 0
FREQUENCY: 0
CONSTIPATION: 0
HEADACHES: 0
DIZZINESS: 0
COUGH: 0
PARESTHESIAS: 0
ARTHRALGIAS: 1
CHILLS: 0
FEVER: 0
SORE THROAT: 0
DYSURIA: 0
HEMATOCHEZIA: 0
NERVOUS/ANXIOUS: 0
JOINT SWELLING: 0
DIARRHEA: 0
NAUSEA: 0
HEARTBURN: 0
HEMATURIA: 0
WEAKNESS: 0
SHORTNESS OF BREATH: 0
EYE PAIN: 0

## 2024-01-22 ASSESSMENT — ACTIVITIES OF DAILY LIVING (ADL): CURRENT_FUNCTION: NO ASSISTANCE NEEDED

## 2024-01-23 ENCOUNTER — OFFICE VISIT (OUTPATIENT)
Dept: FAMILY MEDICINE | Facility: CLINIC | Age: 74
End: 2024-01-23
Payer: COMMERCIAL

## 2024-01-23 VITALS
DIASTOLIC BLOOD PRESSURE: 72 MMHG | SYSTOLIC BLOOD PRESSURE: 130 MMHG | RESPIRATION RATE: 18 BRPM | OXYGEN SATURATION: 95 % | HEART RATE: 84 BPM | HEIGHT: 64 IN | WEIGHT: 214.4 LBS | TEMPERATURE: 97.8 F | BODY MASS INDEX: 36.6 KG/M2

## 2024-01-23 DIAGNOSIS — R06.02 SHORTNESS OF BREATH ON EXERTION: ICD-10-CM

## 2024-01-23 DIAGNOSIS — Z12.31 ENCOUNTER FOR SCREENING MAMMOGRAM FOR BREAST CANCER: ICD-10-CM

## 2024-01-23 DIAGNOSIS — Z13.220 SCREENING CHOLESTEROL LEVEL: ICD-10-CM

## 2024-01-23 DIAGNOSIS — R06.02 SOB (SHORTNESS OF BREATH): Primary | ICD-10-CM

## 2024-01-23 DIAGNOSIS — Z13.1 SCREENING FOR DIABETES MELLITUS: ICD-10-CM

## 2024-01-23 DIAGNOSIS — Z00.00 ENCOUNTER FOR MEDICARE ANNUAL WELLNESS EXAM: Primary | ICD-10-CM

## 2024-01-23 DIAGNOSIS — R91.8 ABNORMAL FINDING ON LUNG IMAGING: ICD-10-CM

## 2024-01-23 DIAGNOSIS — Z13.29 SCREENING FOR THYROID DISORDER: ICD-10-CM

## 2024-01-23 DIAGNOSIS — M35.00 SJOGREN'S SYNDROME, WITH UNSPECIFIED ORGAN INVOLVEMENT (H): ICD-10-CM

## 2024-01-23 DIAGNOSIS — Z29.11 NEED FOR VACCINATION AGAINST RESPIRATORY SYNCYTIAL VIRUS: ICD-10-CM

## 2024-01-23 DIAGNOSIS — Z12.31 VISIT FOR SCREENING MAMMOGRAM: ICD-10-CM

## 2024-01-23 LAB
CHOLEST SERPL-MCNC: 217 MG/DL
FASTING STATUS PATIENT QL REPORTED: YES
FASTING STATUS PATIENT QL REPORTED: YES
GLUCOSE SERPL-MCNC: 96 MG/DL (ref 70–99)
HDLC SERPL-MCNC: 63 MG/DL
LDLC SERPL CALC-MCNC: 135 MG/DL
NONHDLC SERPL-MCNC: 154 MG/DL
TRIGL SERPL-MCNC: 93 MG/DL
TSH SERPL DL<=0.005 MIU/L-ACNC: 4.05 UIU/ML (ref 0.3–4.2)

## 2024-01-23 PROCEDURE — 99213 OFFICE O/P EST LOW 20 MIN: CPT | Mod: 25 | Performed by: NURSE PRACTITIONER

## 2024-01-23 PROCEDURE — 84443 ASSAY THYROID STIM HORMONE: CPT | Performed by: NURSE PRACTITIONER

## 2024-01-23 PROCEDURE — 80061 LIPID PANEL: CPT | Performed by: NURSE PRACTITIONER

## 2024-01-23 PROCEDURE — 82947 ASSAY GLUCOSE BLOOD QUANT: CPT | Performed by: NURSE PRACTITIONER

## 2024-01-23 PROCEDURE — 36415 COLL VENOUS BLD VENIPUNCTURE: CPT | Performed by: NURSE PRACTITIONER

## 2024-01-23 PROCEDURE — 99397 PER PM REEVAL EST PAT 65+ YR: CPT | Performed by: NURSE PRACTITIONER

## 2024-01-23 RX ORDER — RESPIRATORY SYNCYTIAL VIRUS VACCINE 120MCG/0.5
0.5 KIT INTRAMUSCULAR ONCE
Qty: 1 EACH | Refills: 0 | Status: CANCELLED | OUTPATIENT
Start: 2024-01-23 | End: 2024-01-23

## 2024-01-23 ASSESSMENT — ENCOUNTER SYMPTOMS
HEMATURIA: 0
FREQUENCY: 0
CHILLS: 0
CONSTIPATION: 0
COUGH: 0
DIARRHEA: 0
ABDOMINAL PAIN: 0
SHORTNESS OF BREATH: 0
MYALGIAS: 0
JOINT SWELLING: 0
NERVOUS/ANXIOUS: 0
HEADACHES: 0
NAUSEA: 0
DYSURIA: 0
ARTHRALGIAS: 1
EYE PAIN: 0
PALPITATIONS: 0
SORE THROAT: 0
DIZZINESS: 0
FEVER: 0
WEAKNESS: 0

## 2024-01-23 ASSESSMENT — ACTIVITIES OF DAILY LIVING (ADL): CURRENT_FUNCTION: NO ASSISTANCE NEEDED

## 2024-01-23 ASSESSMENT — PAIN SCALES - GENERAL: PAINLEVEL: NO PAIN (0)

## 2024-01-23 NOTE — PATIENT INSTRUCTIONS
Patient Education   Personalized Prevention Plan  You are due for the preventive services outlined below.  Your care team is available to assist you in scheduling these services.  If you have already completed any of these items, please share that information with your care team to update in your medical record.  Health Maintenance Due   Topic Date Due     Zoster (Shingles) Vaccine (1 of 2) Never done     RSV VACCINE (Pregnancy & 60+) (1 - 1-dose 60+ series) Never done     Flu Vaccine (1) 09/01/2023     Mammogram  01/07/2024     Annual Wellness Visit  01/17/2024     Bladder Training: Care Instructions  Your Care Instructions     Bladder training is used to treat urge incontinence and stress incontinence. Urge incontinence means that the need to urinate comes on so fast that you can't get to a toilet in time. Stress incontinence means that you leak urine because of pressure on your bladder. For example, it may happen when you laugh, cough, or lift something heavy.  Bladder training can increase how long you can wait before you have to urinate. It can also help your bladder hold more urine. And it can give you better control over the urge to urinate.  It is important to remember that bladder training takes a few weeks to a few months to make a difference. You may not see results right away, but don't give up.  Follow-up care is a key part of your treatment and safety. Be sure to make and go to all appointments, and call your doctor if you are having problems. It's also a good idea to know your test results and keep a list of the medicines you take.  How can you care for yourself at home?  Work with your doctor to come up with a bladder training program that is right for you. You may use one or more of the following methods.  Delayed urination  In the beginning, try to keep from urinating for 5 minutes after you first feel the need to go.  While you wait, take deep, slow breaths to relax. Kegel exercises can also help  "you delay the need to go to the bathroom.  After some practice, when you can easily wait 5 minutes to urinate, try to wait 10 minutes before you urinate.  Slowly increase the waiting period until you are able to control when you have to urinate.  Scheduled urination  Empty your bladder when you first wake up in the morning.  Schedule times throughout the day when you will urinate.  Start by going to the bathroom every hour, even if you don't need to go.  Slowly increase the time between trips to the bathroom.  When you have found a schedule that works well for you, keep doing it.  If you wake up during the night and have to urinate, do it. Apply your schedule to waking hours only.  Kegel exercises  These tighten and strengthen pelvic muscles, which can help you control the flow of urine. (If doing these exercises causes pain, stop doing them and talk with your doctor.) To do Kegel exercises:  Squeeze your muscles as if you were trying not to pass gas. Or squeeze your muscles as if you were stopping the flow of urine. Your belly, legs, and buttocks shouldn't move.  Hold the squeeze for 3 seconds, then relax for 5 to 10 seconds.  Start with 3 seconds, then add 1 second each week until you are able to squeeze for 10 seconds.  Repeat the exercise 10 times a session. Do 3 to 8 sessions a day.  When should you call for help?  Watch closely for changes in your health, and be sure to contact your doctor if:    Your incontinence is getting worse.     You do not get better as expected.   Where can you learn more?  Go to https://www.[employer].net/patiented  Enter V684 in the search box to learn more about \"Bladder Training: Care Instructions.\"  Current as of: February 28, 2023               Content Version: 13.8    7534-0599 [employer].   Care instructions adapted under license by your healthcare professional. If you have questions about a medical condition or this instruction, always ask your healthcare " professional. [employer] disclaims any warranty or liability for your use of this information.

## 2024-01-23 NOTE — PROGRESS NOTES
"Preventive Care Visit  [facility] EVELIO BYRNES NP, Family Medicine  Jan 23, 2024      SUBJECTIVE:   Karis is a 73 year old, presenting for the following:  Medicare Visit        1/23/2024     7:13 AM   Additional Questions   Roomed by Chata   Accompanied by self     Are you in the first 12 months of your Medicare coverage?  No    She reports March last year she fell in a store fracturing the top of her right humerus and left knee cap.  She was hospitalized and in a brace, improved.  Then she started having right foot problems related to the arch.  She ignored it for months, ended up seeing podiatry who told her it was arthritis related.  Wear good supportive shoes when walking could get arch supports if needed.  She then developed cramping of the right glute; occurred multiple times seen by urgent care and completed physical therapy which helped greatly.  She went to see her sister in October in [place].  Had shortness of breath walking uphill, sister advised it was not normal.  She reports history of abnormal lung imaging possibly related to gardening.  Discussed referral to pulmonary medicine and for stress test patient would like this completed.    Mental health doing well.  She sees rheumatology for her Sjogren's on Reclast told improved bone growth-history osteoporosis.  Physical therapy advised her to do impact exercise to help with bone growth.  In agreement to screening labs.  Discussed could get RSV vaccine at the pharmacy if wanted.  Healthy Habits:     In general, how would you rate your overall health?  Good    Frequency of exercise:  2-3 days/week    Duration of exercise:  30-45 minutes    Do you usually eat at least 4 servings of fruit and vegetables a day, include whole grains    & fiber and avoid regularly eating high fat or \"junk\" foods?  Yes    Taking medications regularly:  Yes    Medication side effects:  Not applicable    Ability to successfully perform " activities of daily living:  No assistance needed    Home Safety:  No safety concerns identified    Hearing Impairment:  No hearing concerns    In the past 6 months, have you been bothered by leaking of urine? Yes    In general, how would you rate your overall mental or emotional health?  Good    Additional concerns today:  Yes      Today's PHQ-2 Score:       1/22/2024     1:01 PM   PHQ-2 ( 1999 [employer])   Q1: Little interest or pleasure in doing things 0   Q2: Feeling down, depressed or hopeless 0   PHQ-2 Score 0   Q1: Little interest or pleasure in doing things Not at all   Q2: Feeling down, depressed or hopeless Not at all   PHQ-2 Score 0           Have you ever done Advance Care Planning? (For example, a Health Directive, POLST, or a discussion with a medical provider or your loved ones about your wishes): No, advance care planning information given to patient to review.  Patient declined advance care planning discussion at this time.       Fall risk  Fallen 2 or more times in the past year?: No  Any fall with injury in the past year?: Yes    Cognitive Screening   1) Repeat 3 items (Leader, Season, Table)    2) Clock draw: NORMAL  3) 3 item recall: Recalls 3 objects  Results: 3 items recalled: COGNITIVE IMPAIRMENT LESS LIKELY    Mini-CogTM Copyright S Cm. Licensed by the author for use in [facility]; reprinted with permission ([place]@.[facility]). All rights reserved.      Do you have sleep apnea, excessive snoring or daytime drowsiness? : no    Reviewed and updated as needed this visit by clinical staff   Tobacco  Allergies  Meds  Problems  Med Hx  Surg Hx  Fam Hx  Soc   Hx        Reviewed and updated as needed this visit by Provider   Tobacco  Allergies  Meds  Problems  Med Hx  Surg Hx  Fam Hx  Soc   Hx Sexual Activity        Social History     Tobacco Use    Smoking status: Former     Packs/day: 0.00     Years: 0.00     Additional pack years: 0.00     Total pack years: 0.00     Types:  Cigarettes     Quit date: 1984     Years since quittin.0     Passive exposure: Past    Smokeless tobacco: Never    Tobacco comments:     nonsmoking hosuehold   Substance Use Topics    Alcohol use: Yes     Comment: 1 glass of wine one/two times a week             2024     1:01 PM   Alcohol Use   Prescreen: >3 drinks/day or >7 drinks/week? No          No data to display              Do you have a current opioid prescription? No  Do you use any other controlled substances or medications that are not prescribed by a provider? None      Current providers sharing in care for this patient include:   Patient Care Team:  Sabrina Matthews NP as PCP - General (Family Medicine)  Finesse Saunders MD as Assigned Rheumatology Provider  Sabrina Matthews NP as Assigned PCP  Mikel Hannah AuD as Audiologist (Audiology)  Champ Barlow MD as MD (Otolaryngology)  Tiffanie Leroy MD as MD (Ophthalmology)  Kimberly Man PA-C as Physician Assistant (Dermatology)  Miguel Browning DPM as Assigned Surgical Provider    The following health maintenance items are reviewed in Epic and correct as of today:  Health Maintenance   Topic Date Due    ZOSTER IMMUNIZATION (1 of 2) Never done    RSV VACCINE (Pregnancy & 60+) (1 - 1-dose 60+ series) Never done    INFLUENZA VACCINE (1) 2023    MAMMO SCREENING  2024    MEDICARE ANNUAL WELLNESS VISIT  2025    ANNUAL REVIEW OF HM ORDERS  2025    FALL RISK ASSESSMENT  2025    COLORECTAL CANCER SCREENING  2025    LIPID  2028    ADVANCE CARE PLANNING  2029    DTAP/TDAP/TD IMMUNIZATION (3 - Td or Tdap) 2032    DEXA  2038    HEPATITIS C SCREENING  Completed    PHQ-2 (once per calendar year)  Completed    Pneumococcal Vaccine: 65+ Years  Completed    COVID-19 Vaccine  Completed    IPV IMMUNIZATION  Aged Out    HPV IMMUNIZATION  Aged Out    MENINGITIS IMMUNIZATION  Aged Out    RSV MONOCLONAL ANTIBODY  Aged Out      Lab work is in process  Labs reviewed in EPIC  BP Readings from Last 3 Encounters:   24 130/72   23 (!) 152/85   10/10/23 138/87    Wt Readings from Last 3 Encounters:   24 97.3 kg (214 lb 6.4 oz)   23 96.6 kg (213 lb)   10/10/23 96.6 kg (213 lb)                  Patient Active Problem List   Diagnosis    Thigh pain    Osteopenia    Sjogren's syndrome (H24)    CARDIOVASCULAR SCREENING; LDL GOAL LESS THAN 160    Advanced directives, counseling/discussion    Palpitations    Acute conjunctivitis    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    Choroidal nevus, left    Acute pain of left shoulder    Displaced fracture of neck of right radius    Compression fracture of T9 vertebra, initial encounter (H)    Age-related osteoporosis with current pathological fracture with routine healing    Chronic right-sided low back pain with right-sided sciatica    Hip pain, right    Impairment of balance    Shortness of breath on exertion    Abnormal finding on lung imaging     Past Surgical History:   Procedure Laterality Date    BIOPSY      right breast lumpectomy    BREAST LUMPECTOMY, RT/LT      right breast, benign    CATARACT IOL, RT/LT      bilateral    COLONOSCOPY N/A 2023    Procedure: COLONOSCOPY, WITH POLYPECTOMY AND BIOPSY;  Surgeon: Geneva Newton DO;  Location: [facility] OR    COLONOSCOPY N/A 2023    Procedure: COLONOSCOPY, FLEXIBLE, WITH LESION REMOVAL USING SNARE;  Surgeon: Geneva Newton DO;  Location: [facility] OR    COLONOSCOPY WITH CO2 INSUFFLATION N/A 2023    Procedure: Colonoscopy with CO2 insufflation;  Surgeon: Geneva Newton DO;  Location: [facility] OR    ORTHOPEDIC SURGERY  2018    right radius        Social History     Tobacco Use    Smoking status: Former     Packs/day: 0.00     Years: 0.00     Additional pack years: 0.00     Total pack years: 0.00     Types: Cigarettes     Quit date: 1984     Years since quittin.0     Passive exposure: Past    Smokeless tobacco:  Never    Tobacco comments:     nonsmoking nadia   Substance Use Topics    Alcohol use: Yes     Comment: 1 glass of wine one/two times a week     Family History   Problem Relation Age of Onset    C.A.D. Mother     Osteoporosis Mother     Hypertension Mother     Heart Disease Mother         CAD, CHF    Coronary Artery Disease Mother     Hyperlipidemia Mother     Diabetes Father     Hypertension Father     Cancer Brother         liver    Diabetes Brother     Diabetes Sister     Breast Cancer Sister 50    Hypertension Sister     Cancer Sister 72        non-hodgkins lymphoma    Cerebrovascular Disease Maternal Grandmother     Diabetes Paternal Grandfather     Diabetes Daughter         type 2    Connective Tissue Disorder Other         2 cousins and niece with MS; aunt with lupus    Genetic Disorder Other         Lupus    Rheumatoid Arthritis Cousin     Genetic Disorder Cousin         MS    Diabetes Niece     Hyperlipidemia Sister     Hypertension Sister     Breast Cancer Sister     Other Cancer Brother         Liver    Diabetes Brother     Other Cancer Sister         non-hodgkins lymphoma    Thyroid Disease Sister     Diabetes Sister     Hypertension Sister     Osteoporosis Sister     Cerebrovascular Disease Sister     Hypertension Sister     Asthma Nephew     Asthma Other     Genetic Disorder Cousin         MS    Genetic Disorder Niece         MS/Lupus         Current Outpatient Medications   Medication Sig Dispense Refill    Calcium Carbonate-Vitamin D (CALCIUM 600 + D OR) Take 1,200 mg by mouth daily       carboxymethylcellulose (CELLUVISC/REFRESH LIQUIGEL) 1 % ophthalmic solution Place 1 drop into both eyes 3 times daily      cetirizine (ZYRTEC) 10 MG tablet Take 10 mg by mouth daily      diclofenac (VOLTAREN) 1 % topical gel Place onto the skin 4 times daily 100 g 6    MAGNESIUM PO Take 250 mg by mouth daily       Multiple Vitamins-Minerals (ICAPS AREDS 2 PO) Take 1 tablet by mouth 2 times daily      Polypodium  Leucotomos (HELIOCARE) 240 MG CAPS       SUDAFED 30 MG OR TABS 1 TABLET EVERY 4 TO 6 HOURS AS NEEDED      VITAMIN C 500 MG OR TABS       Vitamin D, Cholecalciferol, 25 MCG (1000 UT) TABS daily       VITAMIN E 400 UNIT PO CAPS       zoledronic Acid (RECLAST) 5 MG/100ML SOLN infusion        Allergies   Allergen Reactions    Erythromycin Nausea     Tongue turned black.    Nsaids GI Disturbance     Gastritis        Recent Labs   Lab Test 07/10/23  1059 01/17/23  0759 08/22/22  1015 06/02/22  1326 12/22/21  0917 06/03/21  1328 12/21/20  0843 10/08/19  1003 02/19/19  0951   LDL  --  124*  --   --  139*  --  140*   < >  --    HDL  --  68  --   --  73  --  71   < >  --    TRIG  --  90  --   --  114  --  84   < >  --    ALT 16  --   --   --   --   --   --   --  28   CR 0.57  --  0.65   < >  --  0.75  --   --  0.64   GFRESTIMATED >90  --  >90   < >  --  81  --   --  >90   GFRESTBLACK  --   --   --   --   --  >90  --   --  >90   POTASSIUM 4.4  --   --   --   --   --   --   --  4.4   TSH  --  4.68*  --   --  2.68  --   --   --   --     < > = values in this interval not displayed.      Mammogram Screening: Mammogram Screening: Recommended mammography every 1-2 years with patient discussion and risk factor consideration      Review of Systems   Constitutional:  Negative for chills and fever.   HENT:  Negative for congestion, ear pain, hearing loss and sore throat.    Eyes:  Positive for visual disturbance. Negative for pain.   Respiratory:  Negative for cough and shortness of breath.    Cardiovascular:  Negative for chest pain and palpitations.   Gastrointestinal:  Negative for abdominal pain, constipation, diarrhea and nausea.   Genitourinary:  Negative for dysuria, frequency, genital sores, hematuria, pelvic pain, urgency, vaginal bleeding and vaginal discharge.   Musculoskeletal:  Positive for arthralgias. Negative for joint swelling and myalgias.   Skin:  Negative for rash.   Neurological:  Negative for dizziness, weakness  "and headaches.   Psychiatric/Behavioral:  The patient is not nervous/anxious.           OBJECTIVE:   /72   Pulse 84   Temp 97.8  F (36.6  C) (Temporal)   Resp 18   Ht 1.634 m (5' 4.33\")   Wt 97.3 kg (214 lb 6.4 oz)   LMP  (LMP Unknown)   SpO2 95%   BMI 36.42 kg/m     Estimated body mass index is 36.42 kg/m  as calculated from the following:    Height as of this encounter: 1.634 m (5' 4.33\").    Weight as of this encounter: 97.3 kg (214 lb 6.4 oz).  Physical Exam  GENERAL: alert and no distress  EYES: Eyes grossly normal to inspection, PERRL and conjunctivae and sclerae normal  HENT: ear canals and TM's normal, nose and mouth without ulcers or lesions  NECK: no adenopathy, no asymmetry, masses, or scars  RESP: lungs clear to auscultation - no rales, rhonchi or wheezes  BREAST: Deferred per guidelines-asymptomatic per patient; discussed mammogram, self breast exam, symptoms to watch out for and when to follow-up  CV: regular rate and rhythm, normal S1 S2, no S3 or S4, no murmur, click or rub, no peripheral edema  ABDOMEN: soft, nontender, no hepatosplenomegaly, no masses and bowel sounds normal   (female): Deferred per patient no concerns  MS: no gross musculoskeletal defects noted, no edema, no CVA tenderness  SKIN: no suspicious lesions or rashes  NEURO: Normal strength and tone, cranial nerves intact,  mentation intact and speech normal  PSYCH: mentation appears normal, affect normal/bright  LYMPH: no cervical, supraclavicular adenopathy    Diagnostic Test Results:  Labs reviewed in Epic  See orders    ASSESSMENT / PLAN:   Encounter for Medicare annual wellness exam      Need for vaccination against respiratory syncytial virus      Visit for screening mammogram    - MA SCREENING DIGITAL BILAT - Future  (s+30); Future    Shortness of breath on exertion    - Echo Stress Test with Definity; Future  - Adult Pulmonary Medicine  Referral; Future    Screening for diabetes mellitus    - Glucose; " "Future  - Glucose    Screening for thyroid disorder    - TSH with free T4 reflex; Future  - TSH with free T4 reflex    Screening cholesterol level    - Lipid panel reflex to direct LDL Fasting; Future  - Lipid panel reflex to direct LDL Fasting    Encounter for screening mammogram for breast cancer    - *MA Screening Digital Bilateral; Future    Abnormal finding on lung imaging    - Adult Pulmonary Medicine  Referral; Future    Patient has been advised of split billing requirements and indicates understanding: Yes      Counseling  Reviewed preventive health counseling, as reflected in patient instructions      BMI  Estimated body mass index is 36.42 kg/m  as calculated from the following:    Height as of this encounter: 1.634 m (5' 4.33\").    Weight as of this encounter: 97.3 kg (214 lb 6.4 oz).   Weight management plan: Discussed healthy diet and exercise guidelines      She reports that she quit smoking about 40 years ago. Her smoking use included cigarettes. She has been exposed to tobacco smoke. She has never used smokeless tobacco.      Appropriate preventive services were discussed with this patient, including applicable screening as appropriate for fall prevention, nutrition, physical activity, Tobacco-use cessation, weight loss and cognition.  Checklist reviewing preventive services available has been given to the patient.    Reviewed patients plan of care and provided an AVS. The Basic Care Plan (routine screening as documented in Health Maintenance) for Karis meets the Care Plan requirement. This Care Plan has been established and reviewed with the Patient.          Signed Electronically by: ABDOUL WHITING    Identified Health Risks  I have reviewed Opioid Use Disorder and Substance Use Disorder risk factors and made any needed referrals.   Answers submitted by the patient for this visit:  Annual Preventive Visit (Submitted on 1/22/2024)  Chief Complaint: Annual Exam:  Blood in stool: " No  heartburn: No  peripheral edema: No  mood changes: No  Skin sensation changes: No  tenderness: No  breast mass: No  breast discharge: No

## 2024-01-25 NOTE — RESULT ENCOUNTER NOTE
Richard Dyson,    Thank you for your recent office visit.    Here are your recent results.  Normal glucose you are not diabetic.  Normal thyroid level.  For a nondiabetic your cholesterol is considered normal.  Continue to work on your regular exercise.    Feel free to contact me via [employer] or call the clinic at [phone].    Sincerely,    Sabrina Avila, JULIO CESAR, FNP-BC

## 2024-02-02 ENCOUNTER — ANCILLARY PROCEDURE (OUTPATIENT)
Dept: MAMMOGRAPHY | Facility: CLINIC | Age: 74
End: 2024-02-02
Attending: NURSE PRACTITIONER
Payer: COMMERCIAL

## 2024-02-02 DIAGNOSIS — Z12.31 ENCOUNTER FOR SCREENING MAMMOGRAM FOR BREAST CANCER: ICD-10-CM

## 2024-02-02 PROCEDURE — 77067 SCR MAMMO BI INCL CAD: CPT | Mod: TC | Performed by: RADIOLOGY

## 2024-02-12 ENCOUNTER — ANCILLARY PROCEDURE (OUTPATIENT)
Dept: CARDIOLOGY | Facility: CLINIC | Age: 74
End: 2024-02-12
Attending: NURSE PRACTITIONER
Payer: COMMERCIAL

## 2024-02-12 DIAGNOSIS — R06.02 SHORTNESS OF BREATH ON EXERTION: ICD-10-CM

## 2024-02-12 PROCEDURE — 93350 STRESS TTE ONLY: CPT | Performed by: INTERNAL MEDICINE

## 2024-02-12 PROCEDURE — 93321 DOPPLER ECHO F-UP/LMTD STD: CPT | Performed by: INTERNAL MEDICINE

## 2024-02-12 PROCEDURE — 93016 CV STRESS TEST SUPVJ ONLY: CPT | Performed by: INTERNAL MEDICINE

## 2024-02-12 PROCEDURE — 93325 DOPPLER ECHO COLOR FLOW MAPG: CPT | Performed by: INTERNAL MEDICINE

## 2024-02-12 PROCEDURE — 93018 CV STRESS TEST I&R ONLY: CPT | Performed by: INTERNAL MEDICINE

## 2024-02-12 PROCEDURE — 93017 CV STRESS TEST TRACING ONLY: CPT | Performed by: INTERNAL MEDICINE

## 2024-02-12 RX ADMIN — Medication 3 ML: at 10:33

## 2024-02-12 NOTE — RESULT ENCOUNTER NOTE
Richard Dyson,    Thank you for your recent office visit.    Here are your recent results.  Per cardiology normal stress test    Feel free to contact me via [employer] or call the clinic at [phone].    Sincerely,    JULIO CESAR Donovan, FNP-BC

## 2024-02-20 ENCOUNTER — OFFICE VISIT (OUTPATIENT)
Dept: NURSING | Facility: CLINIC | Age: 74
End: 2024-02-20
Attending: NURSE PRACTITIONER
Payer: COMMERCIAL

## 2024-02-20 VITALS — WEIGHT: 215.4 LBS | HEART RATE: 81 BPM | OXYGEN SATURATION: 97 % | BODY MASS INDEX: 36.59 KG/M2

## 2024-02-20 DIAGNOSIS — R06.02 SHORTNESS OF BREATH ON EXERTION: Primary | ICD-10-CM

## 2024-02-20 PROCEDURE — 94729 DIFFUSING CAPACITY: CPT | Performed by: INTERNAL MEDICINE

## 2024-02-20 PROCEDURE — 94060 EVALUATION OF WHEEZING: CPT | Performed by: INTERNAL MEDICINE

## 2024-02-20 PROCEDURE — 94726 PLETHYSMOGRAPHY LUNG VOLUMES: CPT | Performed by: INTERNAL MEDICINE

## 2024-02-20 NOTE — PROGRESS NOTES
Karis Youssef comes into clinic today at the request of Dr. Kamari Glaser Ordering Provider for Complete PFT with post bd.      Agusto Mars, RRT

## 2024-02-21 LAB
DLCOUNC-%PRED-PRE: 103 %
DLCOUNC-PRE: 19.97 ML/MIN/MMHG
DLCOUNC-PRED: 19.33 ML/MIN/MMHG
ERV-%PRED-PRE: 34 %
ERV-PRE: 0.35 L
ERV-PRED: 1.01 L
EXPTIME-PRE: 6.44 SEC
FEF2575-%PRED-POST: 95 %
FEF2575-%PRED-PRE: 78 %
FEF2575-POST: 1.68 L/SEC
FEF2575-PRE: 1.38 L/SEC
FEF2575-PRED: 1.75 L/SEC
FEFMAX-%PRED-PRE: 108 %
FEFMAX-PRE: 6.13 L/SEC
FEFMAX-PRED: 5.66 L/SEC
FEV1-%PRED-PRE: 76 %
FEV1-PRE: 1.58 L
FEV1FEV6-PRE: 79 %
FEV1FEV6-PRED: 79 %
FEV1FVC-PRE: 79 %
FEV1FVC-PRED: 79 %
FEV1SVC-PRE: 70 %
FEV1SVC-PRED: 65 %
FIFMAX-PRE: 3.55 L/SEC
FRCPLETH-%PRED-PRE: 88 %
FRCPLETH-PRE: 2.46 L
FRCPLETH-PRED: 2.77 L
FVC-%PRED-PRE: 75 %
FVC-PRE: 2.01 L
FVC-PRED: 2.66 L
IC-%PRED-PRE: 94 %
IC-PRE: 1.9 L
IC-PRED: 2.02 L
RVPLETH-%PRED-PRE: 98 %
RVPLETH-PRE: 2.12 L
RVPLETH-PRED: 2.14 L
TLCPLETH-%PRED-PRE: 85 %
TLCPLETH-PRE: 4.37 L
TLCPLETH-PRED: 5.11 L
VA-%PRED-PRE: 77 %
VA-PRE: 3.66 L
VC-%PRED-PRE: 70 %
VC-PRE: 2.25 L
VC-PRED: 3.18 L

## 2024-03-14 ENCOUNTER — ANCILLARY PROCEDURE (OUTPATIENT)
Dept: GENERAL RADIOLOGY | Facility: CLINIC | Age: 74
End: 2024-03-14
Attending: INTERNAL MEDICINE
Payer: COMMERCIAL

## 2024-03-14 ENCOUNTER — TELEPHONE (OUTPATIENT)
Dept: PULMONOLOGY | Facility: CLINIC | Age: 74
End: 2024-03-14

## 2024-03-14 DIAGNOSIS — R06.02 SOB (SHORTNESS OF BREATH): ICD-10-CM

## 2024-03-14 PROCEDURE — 71046 X-RAY EXAM CHEST 2 VIEWS: CPT | Mod: GC | Performed by: RADIOLOGY

## 2024-03-14 NOTE — TELEPHONE ENCOUNTER
Voicemail left for [facility] Radiology requesting CT chest wo 03/07/2023 be pushed to  PACS for review.    Brian Dyson LPN  Pulmonary Medicine:  [facility] [place]  Phone: [phone] Fax: [phone]

## 2024-03-19 ENCOUNTER — OFFICE VISIT (OUTPATIENT)
Dept: PULMONOLOGY | Facility: CLINIC | Age: 74
End: 2024-03-19
Payer: COMMERCIAL

## 2024-03-19 VITALS
WEIGHT: 213 LBS | OXYGEN SATURATION: 96 % | HEART RATE: 80 BPM | DIASTOLIC BLOOD PRESSURE: 79 MMHG | RESPIRATION RATE: 18 BRPM | SYSTOLIC BLOOD PRESSURE: 132 MMHG | BODY MASS INDEX: 36.19 KG/M2

## 2024-03-19 DIAGNOSIS — R05.3 CHRONIC COUGH: ICD-10-CM

## 2024-03-19 DIAGNOSIS — R06.02 SHORTNESS OF BREATH ON EXERTION: Primary | ICD-10-CM

## 2024-03-19 DIAGNOSIS — R91.8 ABNORMAL FINDING ON LUNG IMAGING: ICD-10-CM

## 2024-03-19 PROCEDURE — 99215 OFFICE O/P EST HI 40 MIN: CPT | Performed by: INTERNAL MEDICINE

## 2024-03-19 PROCEDURE — 99417 PROLNG OP E/M EACH 15 MIN: CPT | Performed by: INTERNAL MEDICINE
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
## 2024-03-19 NOTE — NURSING NOTE
Karis Youssef's goals for this visit include:   Chief Complaint   Patient presents with    Consult     Abnormal findings imagfing       She requests these members of her care team be copied on today's visit information: yes    PCP: Sabrina Matthews    Referring Provider:  Sabrina Matthews NP  [address] [place] [address]    /79 (BP Location: Left arm, Patient Position: Sitting, Cuff Size: Adult Large)   Pulse 80   Resp 18   Wt 96.6 kg (213 lb)   LMP  (LMP Unknown)   SpO2 96%   BMI 36.19 kg/m      Do you need any medication refills at today's visit? None    Adelfo Figueroa, EMT

## 2024-03-19 NOTE — PATIENT INSTRUCTIONS
Flonase or similar steroid nasal spray for nasal congestion- Flonase if used would be best 1 spray twice a day    Famotidine for reflux- either 10mg or 20mg twice a day. Try for 4 weeks  Take morning medications 30-60 minutes before eating   Don't lay down for at least one hour after eating (two hours ideally)  Elevate your chest to help with reflux by either using a wedge pillow or by raising just the head of the bed with bed raisers by 6-8 inches      Increasing exercise slowly, slowly losing weight with time    Notice any symptoms with sleep- let me know

## 2024-05-16 ENCOUNTER — TELEPHONE (OUTPATIENT)
Dept: DERMATOLOGY | Facility: CLINIC | Age: 74
End: 2024-05-16
Payer: COMMERCIAL

## 2024-05-16 NOTE — TELEPHONE ENCOUNTER
Left Voicemail (1st Attempt) and Sent Mychart (1st Attempt) for the patient to call back and schedule the following:    Appointment type: Return Derm  Provider: Kimberly Man  Return date: next available  Specialty phone number: [phone]  Additional appointment(s) needed:   Additonal Notes:     Attempted to reschedule the appointment with Kimberly Man on 8/28/2024, provider is out of clinic.    Also sent a [employer] message.    Nilda SANCHEZ/Complex Procedure    [facility]   Neurology, NeuroSurgery, NeuroPsychology, Pain Management and Cardiology Specialties  Medical/Surgical Adult Specialties

## 2024-07-01 ENCOUNTER — OFFICE VISIT (OUTPATIENT)
Dept: DERMATOLOGY | Facility: CLINIC | Age: 74
End: 2024-07-01
Payer: COMMERCIAL

## 2024-07-01 DIAGNOSIS — L82.1 SEBORRHEIC KERATOSIS: ICD-10-CM

## 2024-07-01 DIAGNOSIS — D18.01 ANGIOMA OF SKIN: ICD-10-CM

## 2024-07-01 DIAGNOSIS — L81.4 LENTIGO: ICD-10-CM

## 2024-07-01 DIAGNOSIS — D22.9 NEVUS: Primary | ICD-10-CM

## 2024-07-01 PROCEDURE — 99213 OFFICE O/P EST LOW 20 MIN: CPT | Performed by: PHYSICIAN ASSISTANT

## 2024-07-01 NOTE — LETTER
7/1/2024      Karis Youssef  [address] [place] [address]      Dear Colleague,    Thank you for referring your patient, Karis Youssef, to the [facility]. Please see a copy of my visit note below.    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   ROS of the following were done and are negative: Constitutional, Eyes, Ears, Nose,   Mouth, Throat, Cardiovascular, Respiratory, GI, Genitourinary, Musculoskeletal,   Psychiatric, Endocrine, Allergic/Immunologic.    PHYSICAL EXAM:   LMP  (LMP Unknown)   Skin exam performed as follows: Type 2 skin. Mood appropriate  Alert and Oriented X 3. Well developed, well nourished in no distress.  General appearance: Normal  Head including face: Normal  Eyes: conjunctiva and lids: Normal  Mouth: Lips, teeth, gums: Normal  Neck: Normal  Chest-breast/axillae: Normal  Back: Normal  Extremities: digits/nails (clubbing): Normal  Eccrine and Apocrine glands: Normal  Right upper extremity: Normal  Left upper extremity: Normal  Right lower extremity: Normal  Left lower extremity: Normal  Skin: Scalp and body hair: See below    Pt deferred exam of breasts, groin, buttocks: No    Other physical findings:  1. Multiple pigmented macules on extremities and trunk  2. Multiple pigmented macules on face, trunk and extremities  3. Multiple vascular papules on trunk, arms and legs  4.  Multiple scattered keratotic plaques       Except as noted above, no other signs of skin cancer or melanoma.     ASSESSMENT/PLAN:   Benign Full skin cancer screening today. . Patient with history of none  Advised on monthly self exams and 1 year  Patient Education: Appropriate brochures given.    Multiple benign appearing melanocytic nevi on arms, legs and trunk. Discussed ABCDEs of melanoma and sunscreen.   Multiple lentigos on arms, legs and trunk. Advised benign, no treatment needed.  Multiple scattered angiomas. Advised benign, no treatment needed.   Seborrheic keratosis on arms, legs and trunk. Advised benign, no treatment needed.          Follow-up: yearly/PRN sooner    1.) Patient was asked about new and changing moles. YES  2.) Patient received a complete physical skin examination: YES  3.) Patient was counseled to perform a monthly self skin examination: YES  Scribed By: Lucy Gonzalez, MS, PALOLA      Again, thank you for allowing me to participate in the care of your patient.        Sincerely,        Lucy Goznalez PA-C

## 2024-07-01 NOTE — PROGRESS NOTES
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   ROS of the following were done and are negative: Constitutional, Eyes, Ears, Nose,   Mouth, Throat, Cardiovascular, Respiratory, GI, Genitourinary, Musculoskeletal,   Psychiatric, Endocrine, Allergic/Immunologic.    PHYSICAL EXAM:   LMP  (LMP Unknown)   Skin exam performed as follows: Type 2 skin. Mood appropriate  Alert and Oriented X 3. Well developed, well nourished in no distress.  General appearance: Normal  Head including face: Normal  Eyes: conjunctiva and lids: Normal  Mouth: Lips, teeth, gums: Normal  Neck: Normal  Chest-breast/axillae: Normal  Back: Normal  Extremities: digits/nails (clubbing): Normal  Eccrine and Apocrine glands: Normal  Right upper extremity: Normal  Left upper extremity: Normal  Right lower extremity: Normal  Left lower extremity: Normal  Skin: Scalp and body hair: See below    Pt deferred exam of breasts, groin, buttocks: No    Other physical findings:  1. Multiple pigmented macules on extremities and trunk  2. Multiple pigmented macules on face, trunk and extremities  3. Multiple vascular papules on trunk, arms and legs  4. Multiple scattered keratotic plaques       Except as noted above, no other signs of skin cancer or melanoma.     ASSESSMENT/PLAN:   Benign Full skin cancer screening today. . Patient with history of none  Advised on monthly self exams and 1  year  Patient Education: Appropriate brochures given.    Multiple benign appearing melanocytic nevi on arms, legs and trunk. Discussed ABCDEs of melanoma and sunscreen.   Multiple lentigos on arms, legs and trunk. Advised benign, no treatment needed.  Multiple scattered angiomas. Advised benign, no treatment needed.   Seborrheic keratosis on arms, legs and trunk. Advised benign, no treatment needed.          Follow-up: yearly/PRN sooner    1.) Patient was asked about new and changing moles. YES  2.) Patient received a complete physical skin examination: YES  3.) Patient was counseled to perform a monthly self skin examination: YES  Scribed By: Lucy Gonzalez, MS, PA-C

## 2024-07-08 ENCOUNTER — OFFICE VISIT (OUTPATIENT)
Dept: RHEUMATOLOGY | Facility: CLINIC | Age: 74
End: 2024-07-08
Payer: COMMERCIAL

## 2024-07-08 VITALS
WEIGHT: 214.6 LBS | SYSTOLIC BLOOD PRESSURE: 133 MMHG | BODY MASS INDEX: 36.46 KG/M2 | HEART RATE: 83 BPM | RESPIRATION RATE: 18 BRPM | DIASTOLIC BLOOD PRESSURE: 75 MMHG | OXYGEN SATURATION: 95 %

## 2024-07-08 DIAGNOSIS — M80.00XD AGE-RELATED OSTEOPOROSIS WITH CURRENT PATHOLOGICAL FRACTURE WITH ROUTINE HEALING: ICD-10-CM

## 2024-07-08 DIAGNOSIS — M25.50 MULTIPLE JOINT PAIN: ICD-10-CM

## 2024-07-08 DIAGNOSIS — Z92.29 HISTORY OF BISPHOSPHONATE THERAPY: ICD-10-CM

## 2024-07-08 DIAGNOSIS — M35.01 SJOGREN'S SYNDROME WITH KERATOCONJUNCTIVITIS SICCA (H): Primary | ICD-10-CM

## 2024-07-08 LAB
ALBUMIN MFR UR ELPH: 6.1 MG/DL
ALBUMIN SERPL BCG-MCNC: 4.4 G/DL (ref 3.5–5.2)
ALBUMIN UR-MCNC: NEGATIVE MG/DL
ALP SERPL-CCNC: 75 U/L (ref 40–150)
ALT SERPL W P-5'-P-CCNC: 17 U/L (ref 0–50)
ANION GAP SERPL CALCULATED.3IONS-SCNC: 8 MMOL/L (ref 7–15)
APPEARANCE UR: CLEAR
AST SERPL W P-5'-P-CCNC: 22 U/L (ref 0–45)
B BURGDOR IGG+IGM SER QL: 0.56
BASOPHILS # BLD AUTO: 0 10E3/UL (ref 0–0.2)
BASOPHILS NFR BLD AUTO: 0 %
BILIRUB SERPL-MCNC: 0.3 MG/DL
BILIRUB UR QL STRIP: NEGATIVE
BUN SERPL-MCNC: 18.6 MG/DL (ref 8–23)
CALCIUM SERPL-MCNC: 9.8 MG/DL (ref 8.8–10.2)
CHLORIDE SERPL-SCNC: 104 MMOL/L (ref 98–107)
COLOR UR AUTO: YELLOW
CREAT SERPL-MCNC: 0.61 MG/DL (ref 0.51–0.95)
CREAT UR-MCNC: 66.7 MG/DL
DEPRECATED HCO3 PLAS-SCNC: 27 MMOL/L (ref 22–29)
EGFRCR SERPLBLD CKD-EPI 2021: >90 ML/MIN/1.73M2
EOSINOPHIL # BLD AUTO: 0.2 10E3/UL (ref 0–0.7)
EOSINOPHIL NFR BLD AUTO: 3 %
ERYTHROCYTE [DISTWIDTH] IN BLOOD BY AUTOMATED COUNT: 13.1 % (ref 10–15)
GLUCOSE SERPL-MCNC: 113 MG/DL (ref 70–99)
GLUCOSE UR STRIP-MCNC: NEGATIVE MG/DL
HCT VFR BLD AUTO: 40 % (ref 35–47)
HGB BLD-MCNC: 12.8 G/DL (ref 11.7–15.7)
HGB UR QL STRIP: ABNORMAL
IMM GRANULOCYTES # BLD: 0 10E3/UL
IMM GRANULOCYTES NFR BLD: 0 %
KETONES UR STRIP-MCNC: NEGATIVE MG/DL
LEUKOCYTE ESTERASE UR QL STRIP: NEGATIVE
LYMPHOCYTES # BLD AUTO: 2.4 10E3/UL (ref 0.8–5.3)
LYMPHOCYTES NFR BLD AUTO: 36 %
MCH RBC QN AUTO: 29.2 PG (ref 26.5–33)
MCHC RBC AUTO-ENTMCNC: 32 G/DL (ref 31.5–36.5)
MCV RBC AUTO: 91 FL (ref 78–100)
MONOCYTES # BLD AUTO: 0.7 10E3/UL (ref 0–1.3)
MONOCYTES NFR BLD AUTO: 10 %
NEUTROPHILS # BLD AUTO: 3.4 10E3/UL (ref 1.6–8.3)
NEUTROPHILS NFR BLD AUTO: 51 %
NITRATE UR QL: NEGATIVE
PH UR STRIP: 8 [PH] (ref 5–7)
PLATELET # BLD AUTO: 310 10E3/UL (ref 150–450)
POTASSIUM SERPL-SCNC: 4.4 MMOL/L (ref 3.4–5.3)
PROT SERPL-MCNC: 7.3 G/DL (ref 6.4–8.3)
PROT/CREAT 24H UR: 0.09 MG/MG CR (ref 0–0.2)
RBC # BLD AUTO: 4.38 10E6/UL (ref 3.8–5.2)
RBC #/AREA URNS AUTO: NORMAL /HPF
SODIUM SERPL-SCNC: 139 MMOL/L (ref 135–145)
SP GR UR STRIP: 1.02 (ref 1–1.03)
UROBILINOGEN UR STRIP-ACNC: 0.2 E.U./DL
VIT D+METAB SERPL-MCNC: 43 NG/ML (ref 20–50)
WBC # BLD AUTO: 6.7 10E3/UL (ref 4–11)
WBC #/AREA URNS AUTO: NORMAL /HPF

## 2024-07-08 PROCEDURE — 86618 LYME DISEASE ANTIBODY: CPT | Performed by: INTERNAL MEDICINE

## 2024-07-08 PROCEDURE — 84156 ASSAY OF PROTEIN URINE: CPT | Performed by: INTERNAL MEDICINE

## 2024-07-08 PROCEDURE — 85025 COMPLETE CBC W/AUTO DIFF WBC: CPT | Performed by: INTERNAL MEDICINE

## 2024-07-08 PROCEDURE — 81001 URINALYSIS AUTO W/SCOPE: CPT | Performed by: INTERNAL MEDICINE

## 2024-07-08 PROCEDURE — 82306 VITAMIN D 25 HYDROXY: CPT | Performed by: INTERNAL MEDICINE

## 2024-07-08 PROCEDURE — G2211 COMPLEX E/M VISIT ADD ON: HCPCS | Performed by: INTERNAL MEDICINE

## 2024-07-08 PROCEDURE — 36415 COLL VENOUS BLD VENIPUNCTURE: CPT | Performed by: INTERNAL MEDICINE

## 2024-07-08 PROCEDURE — 80053 COMPREHEN METABOLIC PANEL: CPT | Performed by: INTERNAL MEDICINE

## 2024-07-08 PROCEDURE — 99214 OFFICE O/P EST MOD 30 MIN: CPT | Performed by: INTERNAL MEDICINE

## 2024-07-08 RX ORDER — HEPARIN SODIUM (PORCINE) LOCK FLUSH IV SOLN 100 UNIT/ML 100 UNIT/ML
5 SOLUTION INTRAVENOUS
Status: CANCELLED | OUTPATIENT
Start: 2024-08-25

## 2024-07-08 RX ORDER — DIPHENHYDRAMINE HYDROCHLORIDE 50 MG/ML
50 INJECTION INTRAMUSCULAR; INTRAVENOUS
Status: CANCELLED
Start: 2024-08-25

## 2024-07-08 RX ORDER — HEPARIN SODIUM,PORCINE 10 UNIT/ML
5-20 VIAL (ML) INTRAVENOUS DAILY PRN
Status: CANCELLED | OUTPATIENT
Start: 2024-08-25

## 2024-07-08 RX ORDER — METHYLPREDNISOLONE SODIUM SUCCINATE 125 MG/2ML
125 INJECTION, POWDER, LYOPHILIZED, FOR SOLUTION INTRAMUSCULAR; INTRAVENOUS
Status: CANCELLED
Start: 2024-08-25

## 2024-07-08 RX ORDER — ALBUTEROL SULFATE 90 UG/1
1-2 AEROSOL, METERED RESPIRATORY (INHALATION)
Status: CANCELLED
Start: 2024-08-25

## 2024-07-08 RX ORDER — ZOLEDRONIC ACID 5 MG/100ML
5 INJECTION, SOLUTION INTRAVENOUS ONCE
Status: CANCELLED
Start: 2024-08-25

## 2024-07-08 RX ORDER — EPINEPHRINE 1 MG/ML
0.3 INJECTION, SOLUTION, CONCENTRATE INTRAVENOUS EVERY 5 MIN PRN
Status: CANCELLED | OUTPATIENT
Start: 2024-08-25

## 2024-07-08 RX ORDER — FLUTICASONE PROPIONATE 50 MCG
SPRAY, SUSPENSION (ML) NASAL
COMMUNITY
Start: 2024-03-20

## 2024-07-08 RX ORDER — MEPERIDINE HYDROCHLORIDE 25 MG/ML
25 INJECTION INTRAMUSCULAR; INTRAVENOUS; SUBCUTANEOUS EVERY 30 MIN PRN
Status: CANCELLED | OUTPATIENT
Start: 2024-08-25

## 2024-07-08 RX ORDER — ALBUTEROL SULFATE 0.83 MG/ML
2.5 SOLUTION RESPIRATORY (INHALATION)
Status: CANCELLED | OUTPATIENT
Start: 2024-08-25

## 2024-07-08 NOTE — PROGRESS NOTES
Rheumatology Clinic Visit      Karis Youssef MRN# [id number]   YOB: 1950 Age: 74 year old      Date of visit: 7/08/24   PCP: Sabrina Matthews    Chief Complaint   Patient presents with:  Osteoporosis: Left knee pain. Always has some pain. Uses voltaren gel every night, sometimes during the day also.    Assessment and Plan     1. Sjogren's Syndrome (ZELDA+, SSA+, sicca symptoms): Diagnosed in 2009 by Dr. Delatorre. Currently doing well with frequent sips of water and artificial tears.    - Labs now: CBC, CMP, UA, Uprotein:creatinine    2.  History of bilateral knee pain:  Osteoarthritis and meniscal tears based on patient history and following with orthopedic surgeon at [facility].  Symptoms are degenerative in nature.  She does not limit her activities because of knee pain.      3.  Osteoporosis: Vertebral compression fracture seen on 5/18/2021 MRI of the lumbar spine performed at [facility].  Previously had a diagnosis of osteopenia based on 2017 DEXA.   She has a history of being on Fosamax in the past but had significant heartburn; she also reports having easy heartburn with any NSAID.  Therefore, avoided oral bisphosphonates and started zoledronic acid with the first dose on 7/1/2021; subsequently received on 8/22/2022, 8/25/2023.  Continue Reclast until 2026.  6/14/2023 DEXA stable; plan to repeat DEXA in 2026.  - Continue zoledronic acid 5 mg IV once yearly, next due on or shortly after 8/25/2024  - Continue vitamin D 1000 IU daily  - Continue calcium 1200 mg daily  - Labs now: Calcium, vitamin D    4. R>L dorsal midfoot pain: No swelling.  Worse after increased activity.  Consistent with degenerative arthritis of the midfoot.  Advised wearing supportive shoes whenever ambulatory, indoors and outdoors.  Has seen podiatry.    5.  Tick exposure: Patient reports being bit by 5 ticks this summer and would like Lyme testing.  - Lab today: Lyme    Total minutes spent in evaluation with  patient, documentation, , and review of pertinent studies and chart notes: 24  The longitudinal plan of care for the rheumatology problem(s) were addressed during this visit.  Due to added complexity of care, we will continue to support the patient and the subsequent management of this condition with ongoing continuity of care.      Ms. Youssef verbalized agreement with and understanding of the rational for the diagnosis and treatment plan.  All questions were answered to best of my ability and the patient's satisfaction. Ms. Youssef was advised to contact the clinic with any questions that may arise after the clinic visit.      Thank you for involving me in the care of the patient    Return to clinic: 1 year    HPI   Karis Youssef is a 74 year old female with a past medical history significant for Sjogren's syndrome and osteoporosis who is seen for rheumatology follow-up    4/23/2013 rheumatology clinic note by Dr. Kwabena Delatorre documents that the patient was following up for a positive SSA and sicca symptoms..  Positive ZELDA.  Positive SSB.  Does not have evidence of other connective tissue disease.  No arthritis, vasculitis, and her review of systems for respiratory, cardiovascular, and CNS is normal.  No rash or parotid swelling.  No new neurologic symptoms.  No lymphadenopathy.  He advised lip biopsy to confirm.    10/4/2018 clinic note by Dr. Carpenter documents Sjogren's with possibly increasing symptoms so was referred to rheumatology.    2/19/2019: Ms. Youssef reports that she has had joint issues most of her life. TMJ tx'd by not chewing gum.  Bilateral knee pain: started 20 yrs ago with torn meniscus, and now bone on bone and was told she may need TKA. Left knee sometimes gives out so she uses hiking poles; was able to hike all over [place] going up and down stairs with worsening knee pain that resolved with rest and acupuncture.  Plans to retire in May 2019.  Fell last year with distal radius and  distal ulna fracture s/p ORIF.   Some stiffness in the dorsal midfeet for 10 minutes; and sometimes in the left shoulder that resolves within 10 minutes. Hx of left frozen shoulder that resolved with physical therapy. There was a concern for COPD at one point but she saw a pulmonologist who told her that she does not have COPD.  Thicker respiratory secretions in the morning that takes a little bit of time to get them going; she was told that she may have postnasal drip by her allergist and is taking Claritin for this.  Dry mouth is treated with frequent sips of water; she reports seeing a dentist twice yearly and has not had recent cavities.  Dry eyes are treated with artificial tears 3 times daily; she does not notice a difference between the ones that are preservative-free versus the ones that have preservative.  She tells me that her ophthalmologist recommended she take Claritin for allergies.    Currently doing well.  Following with her dentist regularly6/3/2021: She reports that she is coming in today because of osteoporosis.  Regarding Sjogren's, she says that everything is stable.  She continues to use frequent sips of water and reports having fairly good oral dentition but does note having a history of TMJ in the past and does grind her teeth.  She follows with the dentist regularly.  She also continues to use artificial tears 1-2 times per day with good control of her dry eyes most recently she was installing a fence and bending over to put screws in at the bottom of the fence; she felt some pain in her back that was evaluated and found to be a vertebral compression fracture.  She has followed with a surgeon who told her that the compression fracture have so far appeared stable and therefore no intervention from surgical or interventional radiology perspective is needed at this time.  She is currently on calcitonin that has helped some with the pain.  She finds it Tylenol 1000 mg twice daily is also  effective for her back pain.  She is here to discuss osteoporosis treatment.  She notes in the past she has had significant heartburn with Fosamax and any NSAID.    6/2/2022: Currently doing well.  Did not feel well after last Reclast infusion but hoping the next infusion is better tolerated.  Dry eye well controlled with eyedrops as needed.  Dry mouth controlled with frequent sips of water.  Following with a dentist regularly and has not had recent dental caries.  No night sweats.  No unintentional weight loss.    7/10/2023: Doing well.  Fell and broke her right humerus and left patella; both treated nonoperatively and completely healed at this point per patient.  Due for Reclast infusion in August 2023.  Taking calcium and vitamin D supplementations.  Dry eye and dry mouth controlled, stable.  Occasional dorsal midfoot pain, worse on the right, worse after increased activity and improves with rest.  No chest pain or pressure or shortness of breath.  No nausea, vomiting, constipation, or diarrhea.  No fevers or chills.  No pain or burning with urination.  No increased urinary frequency.  No UTI symptoms.    Today, 7/8/2024: Currently doing well.  Reclast infusions without side effects.  Taking calcium and vitamin D supplementation.  Dry eye and dry mouth controlled/stable; frequent sips of water and artificial tears as needed are effective.  Has had 5 ticks on her this summer and would like Lyme testing.  Has seen podiatry for midfoot pain, improved with shoe wear.  No UTI symptoms.    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    ROS   12 point review of system was completed and negative except as noted in the HPI     Active Problem List     Patient Active Problem List   Diagnosis    Thigh pain    Osteopenia    Sjogren's syndrome (H24)    CARDIOVASCULAR SCREENING; LDL GOAL LESS THAN 160    Palpitations    Acute  conjunctivitis    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    Choroidal nevus, left    Acute pain of left shoulder    Displaced fracture of neck of right radius    Compression fracture of T9 vertebra, initial encounter (H)    Age-related osteoporosis with current pathological fracture with routine healing    Chronic right-sided low back pain with right-sided sciatica    Hip pain, right    Impairment of balance    Shortness of breath on exertion    Abnormal finding on lung imaging    History of bisphosphonate therapy     Past Medical History     Past Medical History:   Diagnosis Date    Abnormal Pap smear 1995    had colpo 1995-hyperplasia, nl since    Arthritis     Disturbance of skin sensation     Osteopenia     Osteopenia     Seasonal allergies     Sjogren's syndrome (H24)      Past Surgical History     Past Surgical History:   Procedure Laterality Date    BIOPSY  2000    right breast lumpectomy    BREAST LUMPECTOMY, RT/LT      right breast, benign    CATARACT IOL, RT/LT      bilateral    COLONOSCOPY N/A 5/12/2023    Procedure: COLONOSCOPY, WITH POLYPECTOMY AND BIOPSY;  Surgeon: Geneva Newton DO;  Location: [facility] OR    COLONOSCOPY N/A 5/12/2023    Procedure: COLONOSCOPY, FLEXIBLE, WITH LESION REMOVAL USING SNARE;  Surgeon: Geneva Newton DO;  Location: [facility] OR    COLONOSCOPY WITH CO2 INSUFFLATION N/A 5/12/2023    Procedure: Colonoscopy with CO2 insufflation;  Surgeon: Geneva Newton DO;  Location: [facility] OR    ORTHOPEDIC SURGERY  01/2018    right radius      Allergy     Allergies   Allergen Reactions    Erythromycin Nausea     Tongue turned black.    Nsaids GI Disturbance     Gastritis        Current Medication List     Current Outpatient Medications   Medication Sig Dispense Refill    Calcium Carbonate-Vitamin D (CALCIUM 600 + D OR) Take 1,200 mg by mouth daily       carboxymethylcellulose (CELLUVISC/REFRESH LIQUIGEL) 1 % ophthalmic solution Place 1 drop into both eyes 3 times daily      cetirizine (ZYRTEC) 10 MG  tablet Take 10 mg by mouth daily      diclofenac (VOLTAREN) 1 % topical gel Place onto the skin 4 times daily 100 g 6    fluticasone (FLONASE) 50 MCG/ACT nasal spray       MAGNESIUM PO Take 250 mg by mouth daily       Multiple Vitamins-Minerals (ICAPS AREDS 2 PO) Take 1 tablet by mouth 2 times daily      NEW MED Nettle tincture      Polypodium Leucotomos (HELIOCARE) 240 MG CAPS       SUDAFED 30 MG OR TABS 1 TABLET EVERY 4 TO 6 HOURS AS NEEDED      VITAMIN C 500 MG OR TABS       Vitamin D, Cholecalciferol, 25 MCG (1000 UT) TABS daily       VITAMIN E 400 UNIT PO CAPS       zoledronic Acid (RECLAST) 5 MG/100ML SOLN infusion        No current facility-administered medications for this visit.     Social History   See HPI    Family History     Family History   Problem Relation Age of Onset    C.A.D. Mother     Osteoporosis Mother     Hypertension Mother     Heart Disease Mother         CAD, CHF    Coronary Artery Disease Mother     Hyperlipidemia Mother     Diabetes Father     Hypertension Father     Cancer Brother         liver    Diabetes Brother     Diabetes Sister     Breast Cancer Sister 50    Hypertension Sister     Cancer Sister 72        non-hodgkins lymphoma    Cerebrovascular Disease Maternal Grandmother     Diabetes Paternal Grandfather     Diabetes Daughter         type 2    Connective Tissue Disorder Other         2 cousins and niece with MS; aunt with lupus    Genetic Disorder Other         Lupus    Rheumatoid Arthritis Cousin     Genetic Disorder Cousin         MS    Diabetes Niece     Hyperlipidemia Sister     Hypertension Sister     Breast Cancer Sister     Other Cancer Brother         Liver    Diabetes Brother     Other Cancer Sister         non-hodgkins lymphoma    Thyroid Disease Sister     Diabetes Sister     Hypertension Sister     Osteoporosis Sister     Cerebrovascular Disease Sister     Hypertension Sister     Asthma Nephew     Asthma Other     Genetic Disorder Cousin         MS    Genetic  "Disorder Niece         MS/Lupus       Physical Exam     Temp Readings from Last 3 Encounters:   01/23/24 97.8  F (36.6  C) (Temporal)   09/25/23 99.2  F (37.3  C) (Tympanic)   08/25/23 97.8  F (36.6  C) (Oral)     BP Readings from Last 5 Encounters:   07/08/24 133/75   03/19/24 132/79   01/23/24 130/72   11/27/23 (!) 152/85   10/10/23 138/87     Pulse Readings from Last 1 Encounters:   07/08/24 83     Resp Readings from Last 1 Encounters:   07/08/24 18     Estimated body mass index is 36.46 kg/m  as calculated from the following:    Height as of 1/23/24: 1.634 m (5' 4.33\").    Weight as of this encounter: 97.3 kg (214 lb 9.6 oz).    GEN: NAD. Healthy appearing adult.   HEENT: Dry mucous membranes.  Anicteric, noninjected sclera. No obvious external lesions of the ear and nose. Hearing intact.  CV: S1, S2. RRR. No m/r/g  PULM: No increased work of breathing. CTA bilaterally   MSK: MCPs, PIPs, DIPs without swelling or tenderness to palpation.  Wrists without swelling or tenderness to palpation.  Feet without swelling or tenderness to palpation.   SKIN: No rash or jaundice seen  PSYCH: Alert. Appropriate.      Labs / Imaging (select studies)     ZELDA by EIA positive in 2009    CBC  Recent Labs   Lab Test 07/10/23  1059 06/02/22  1326 02/19/19  0951   WBC 7.1 8.5 7.5   RBC 4.47 4.49 4.38   HGB 13.2 13.1 12.8   HCT 39.8 41.6 39.9   MCV 89 93 91   RDW 13.4 13.8 13.7    328 317   MCH 29.5 29.2 29.2   MCHC 33.2 31.5 32.1   NEUTROPHIL 50 50 50.9   LYMPH 37 36 35.3   MONOCYTE 9 10 9.5   EOSINOPHIL 3 4 3.8   BASOPHIL 1 0 0.5   ANEU  --   --  3.8   ALYM  --   --  2.7   MATHEUS  --   --  0.7   AEOS  --   --  0.3   ABAS  --   --  0.0   ANEUTAUTO 3.6 4.3  --    ALYMPAUTO 2.6 3.0  --    AMONOAUTO 0.7 0.9  --    AEOSAUTO 0.2 0.4  --    ABSBASO 0.1 0.0  --      CMP  Recent Labs   Lab Test 01/23/24  0804 07/10/23  1059 01/17/23  0759 08/22/22  1015 06/02/22  1326 12/22/21  0917 06/03/21  1328 12/21/20  0843 10/08/19  1003 " 02/19/19  0951   NA  --  140  --   --   --   --   --   --   --  141   POTASSIUM  --  4.4  --   --   --   --   --   --   --  4.4   CHLORIDE  --  105  --   --   --   --   --   --   --  107   CO2  --  24  --   --   --   --   --   --   --  27   ANIONGAP  --  11  --   --   --   --   --   --   --  7   GLC 96 97 107*  --   --  103*  --  99 106* 99   BUN  --  18.2  --   --   --   --   --   --   --  17   CR  --  0.57  --  0.65 0.65  --  0.75  --   --  0.64   GFRESTIMATED  --  >90  --  >90 >90  --  81  --   --  >90   GFRESTBLACK  --   --   --   --   --   --  >90  --   --  >90   VINNY  --  9.8  --  9.4 9.9  --  9.4  --   --  9.6   BILITOTAL  --  0.3  --   --   --   --   --   --   --  0.3   ALBUMIN  --  4.4  --   --  3.9  --   --   --   --  3.6   PROTTOTAL  --  7.3  --   --   --   --   --   --   --  7.1   ALKPHOS  --  81  --   --   --   --   --   --   --  103   AST  --  23  --   --   --   --   --   --   --  21   ALT  --  16  --   --   --   --   --   --   --  28     Calcium/VitaminD  Recent Labs   Lab Test 07/10/23  1059 08/22/22  1015 06/02/22  1326 06/03/21  1328   VINNY 9.8 9.4 9.9 9.4   VITDT 51  --  44 35     TSH/T4  Recent Labs   Lab Test 01/23/24  0804 01/17/23  0759 12/22/21  0917   TSH 4.05 4.68* 2.68   T4  --  1.05  --      Hepatitis C  Recent Labs   Lab Test 05/18/16  0955   HCVAB Nonreactive   Assay performance characteristics have not been established for newborns,   infants, and children       Lyme ab screening  Recent Labs   Lab Test 05/18/16  0955   LYMEGM 0.06     UA  Recent Labs   Lab Test 07/10/23  1059 02/19/19  1003   COLOR Yellow Yellow   APPEARANCE Clear Clear   URINEGLC Negative Negative   URINEBILI Negative Negative   SG 1.020 1.020   URINEPH 6.0 6.0   PROTEIN Negative Negative   UROBILINOGEN 0.2 0.2   NITRITE Negative Negative   UBLD Trace* Negative   LEUKEST Negative Negative   WBCU 0-5  --    RBCU 0-2  --      Urine Microscopic  Recent Labs   Lab Test 07/10/23  1059   WBCU 0-5   RBCU 0-2     Urine  Protein  GHUTP and UTP= Urine protein (random), GHUTPG and UTPG = urine protein:creatinine ratio (random), UCRR = urine creatinine (random)  Recent Labs   Lab Test 07/10/23  1059 02/19/19  1003   GHUTP 12.2  --    UTP  --  0.05   GHUTPG 0.10  --    UTPG  --  0.06   UCRR 121.0 86       Immunization History     Immunization History   Administered Date(s) Administered    COVID-19 12+ (2023-24) (Pfizer) 10/05/2023    COVID-19 Bivalent 12+ (Pfizer) 09/22/2022    COVID-19 Monovalent 18+ (Moderna) 11/12/2021    COVID-19 Vaccine ([place]) 03/10/2021    Influenza (intradermal) 11/28/2011    Pneumo Conj 13-V (2010&after) 01/11/2018    Pneumococcal 23 valent 10/04/2018    TD,PF 7+ (Tenivac) 08/01/2005    TDAP (Adacel,Boostrix) 06/09/2022    TDAP Vaccine (Adacel) 04/18/2012          Chart documentation done in part with Dragon Voice recognition Software. Although reviewed after completion, some word and grammatical error may remain.    Finesse Saunders MD

## 2024-07-08 NOTE — PATIENT INSTRUCTIONS
RHEUMATOLOGY    [facility]  [address] [place] [address]  [place] [address]    Phone number: [phone]  Fax number: [phone]    If you need a medication refill, please contact us as you may need lab work and/or a follow up visit prior to your refill.      Thank you for choosing [facility]!    Anne Costello CMA Rheumatology

## 2024-07-08 NOTE — NURSING NOTE
RAPID3 (0-30) Cumulative Score  3.8          RAPID3 Weighted Score (divide #4 by 3 and that is the weighted score)  1.2

## 2024-07-09 PROBLEM — G89.29 CHRONIC RIGHT-SIDED LOW BACK PAIN WITH RIGHT-SIDED SCIATICA: Status: RESOLVED | Noted: 2023-10-18 | Resolved: 2024-07-09

## 2024-07-09 PROBLEM — M25.551 HIP PAIN, RIGHT: Status: RESOLVED | Noted: 2023-10-18 | Resolved: 2024-07-09

## 2024-07-09 PROBLEM — R26.89 IMPAIRMENT OF BALANCE: Status: RESOLVED | Noted: 2023-10-18 | Resolved: 2024-07-09

## 2024-07-09 PROBLEM — M54.41 CHRONIC RIGHT-SIDED LOW BACK PAIN WITH RIGHT-SIDED SCIATICA: Status: RESOLVED | Noted: 2023-10-18 | Resolved: 2024-07-09

## 2024-07-09 NOTE — PROGRESS NOTES
DISCHARGE  Reason for Discharge: Patient has failed to schedule further appointments.    Equipment Issued:     Discharge Plan: Patient to continue home program.    Referring Provider:  Sabiha Schwartz

## 2024-08-08 ENCOUNTER — E-VISIT (OUTPATIENT)
Dept: FAMILY MEDICINE | Facility: CLINIC | Age: 74
End: 2024-08-08
Payer: COMMERCIAL

## 2024-08-08 DIAGNOSIS — L29.9 ITCHING: ICD-10-CM

## 2024-08-08 DIAGNOSIS — L30.9 DERMATITIS: Primary | ICD-10-CM

## 2024-08-08 PROCEDURE — 99207 PR NO CHARGE LOS: CPT | Performed by: NURSE PRACTITIONER

## 2024-08-08 RX ORDER — PREDNISONE 20 MG/1
TABLET ORAL
Qty: 15 TABLET | Refills: 0 | Status: SHIPPED | OUTPATIENT
Start: 2024-08-08 | End: 2024-08-20

## 2024-08-08 RX ORDER — HYDROXYZINE HYDROCHLORIDE 10 MG/1
10 TABLET, FILM COATED ORAL 3 TIMES DAILY PRN
Qty: 90 TABLET | Refills: 1 | Status: SHIPPED | OUTPATIENT
Start: 2024-08-08

## 2024-08-08 RX ORDER — TRIAMCINOLONE ACETONIDE 1 MG/G
OINTMENT TOPICAL 2 TIMES DAILY
Qty: 30 G | Refills: 1 | Status: SHIPPED | OUTPATIENT
Start: 2024-08-08 | End: 2024-08-22

## 2024-08-13 ENCOUNTER — OFFICE VISIT (OUTPATIENT)
Dept: URGENT CARE | Facility: URGENT CARE | Age: 74
End: 2024-08-13
Payer: COMMERCIAL

## 2024-08-13 VITALS
TEMPERATURE: 98.3 F | HEART RATE: 85 BPM | OXYGEN SATURATION: 94 % | WEIGHT: 214 LBS | RESPIRATION RATE: 18 BRPM | SYSTOLIC BLOOD PRESSURE: 166 MMHG | DIASTOLIC BLOOD PRESSURE: 84 MMHG | BODY MASS INDEX: 36.36 KG/M2

## 2024-08-13 DIAGNOSIS — R21 RASH: Primary | ICD-10-CM

## 2024-08-13 LAB
DEPRECATED S PYO AG THROAT QL EIA: NEGATIVE
GROUP A STREP BY PCR: NOT DETECTED

## 2024-08-13 PROCEDURE — 87651 STREP A DNA AMP PROBE: CPT | Performed by: NURSE PRACTITIONER

## 2024-08-13 PROCEDURE — 99213 OFFICE O/P EST LOW 20 MIN: CPT | Performed by: NURSE PRACTITIONER

## 2024-08-13 NOTE — PROGRESS NOTES
Assessment & Plan     Rash    - Streptococcus A Rapid Screen w/Reflex to PCR - Clinic Collect  - Group A Streptococcus PCR Throat Swab     Unsure cause of rash and reassuring that steroid cream resolved rash on back of neck. Discussed option of topical vs oral steroid and since still localized patient would prefer topical and has refill on file for triamcinolone which was just prescribed a few days ago. Triamcinolone cream twice daily to affected areas of arms, abdomen, breasts for 7-10 days. If not resolved may fill prescription for steroid in week. 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 Strep testing in process, will notify if positive.     Follow-up with PCP if symptoms persist for 2 weeks, and sooner if symptoms worsen or new symptoms develop.     Discussed red flag symptoms which warrant immediate visit in emergency room    All questions were answered and patient verbalized understanding. AVS reviewed with patient.     Anya Monte, DNP, APRN, CNP 8/13/2024 1:55 PM  [facility]    Susan Dyson is a 74 year old female who presents to clinic today for the following health issues:  Chief Complaint   Patient presents with    Urgent Care    Derm Problem     Per patient states she had a rash behind right upper shoulder did E-visit was prescribed oral and ointment but told to only use the ointment if rash is only small part of area which she has only been using ointment but yesterday noticed a rash developing on breast area and today noticed it radiating to abdomen. Patient states she never picked up oral steroid only ointment from the e-visit.         8/13/2024     1:05 PM   Additional Questions   Roomed by CONRADO Frost   Accompanied by Self       Rash    Onset of rash was a few weeks ago.   Course of illness is worsening.  Current and Associated symptoms: redness, raised    Location of the rash:  started in back of neck which today spread to bilateral arms and also noticed on  abdomen and breasts  Previous history of a similar rash? 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 She has been having SOB with exertion and is following with pulmonary    Treatment measures tried include: homemade ointment usually helps but did not help this time  Did an evisit yesterday and was prescribed triamcinolone cream for small area of rash, prednisone if rash is over large area, and hydroxyzine for itching as needed. Has been using triamcinolone cream on her neck which has been helping.   Has a history of Sjogren's Syndrome.  No history of diabetes.     Problem list, Medication list, Allergies, and Medical history reviewed in EPIC.    ROS:  Review of systems negative except for noted above        Objective    BP (!) 166/84   Pulse 85   Temp 98.3  F (36.8  C) (Tympanic)   Resp 18   Wt 97.1 kg (214 lb)   LMP  (LMP Unknown)   SpO2 94%   BMI 36.36 kg/m    Physical Exam  Constitutional:       General: She is not in acute distress.     Appearance: She is not toxic-appearing or diaphoretic.   HENT:      Head: Normocephalic and atraumatic.      Mouth/Throat:      Mouth: No angioedema.   Skin:     General: Skin is warm and dry.      Findings: Rash present.      Comments: Fine erythematous macular papular rash bilateral elbows, bilateral breasts, abdomen   Neurological:      Mental Status: She is alert.        Labs:  Results for orders placed or performed in visit on 08/13/24   Streptococcus A Rapid Screen w/Reflex to PCR - Clinic Collect     Status: Normal    Specimen: Throat; Swab   Result Value Ref Range    Group A Strep antigen Negative Negative             Verbal consent obtained from patient to take photo for medical [employer]  health record

## 2024-08-13 NOTE — PATIENT INSTRUCTIONS
Triamcinolone cream twice daily to affected areas of arms, abdomen, breasts for 7-10 days. If not resolved may fill prescription for steroid.

## 2024-08-21 ENCOUNTER — TRANSFERRED RECORDS (OUTPATIENT)
Dept: HEALTH INFORMATION MANAGEMENT | Facility: CLINIC | Age: 74
End: 2024-08-21
Payer: COMMERCIAL

## 2024-09-05 ENCOUNTER — LAB (OUTPATIENT)
Dept: INFUSION THERAPY | Facility: CLINIC | Age: 74
End: 2024-09-05
Attending: INTERNAL MEDICINE
Payer: COMMERCIAL

## 2024-09-05 VITALS
SYSTOLIC BLOOD PRESSURE: 117 MMHG | RESPIRATION RATE: 16 BRPM | OXYGEN SATURATION: 95 % | WEIGHT: 215 LBS | HEIGHT: 64 IN | HEART RATE: 66 BPM | TEMPERATURE: 97.8 F | DIASTOLIC BLOOD PRESSURE: 76 MMHG | BODY MASS INDEX: 36.7 KG/M2

## 2024-09-05 DIAGNOSIS — M80.00XD AGE-RELATED OSTEOPOROSIS WITH CURRENT PATHOLOGICAL FRACTURE WITH ROUTINE HEALING: Primary | ICD-10-CM

## 2024-09-05 DIAGNOSIS — Z92.29 HISTORY OF BISPHOSPHONATE THERAPY: ICD-10-CM

## 2024-09-05 DIAGNOSIS — Z92.29 HISTORY OF BISPHOSPHONATE THERAPY: Primary | ICD-10-CM

## 2024-09-05 DIAGNOSIS — M80.00XD AGE-RELATED OSTEOPOROSIS WITH CURRENT PATHOLOGICAL FRACTURE WITH ROUTINE HEALING: ICD-10-CM

## 2024-09-05 LAB
CALCIUM SERPL-MCNC: 9.6 MG/DL (ref 8.8–10.4)
CREAT SERPL-MCNC: 0.7 MG/DL (ref 0.51–0.95)
EGFRCR SERPLBLD CKD-EPI 2021: >90 ML/MIN/1.73M2
HOLD SPECIMEN: NORMAL
HOLD SPECIMEN: NORMAL

## 2024-09-05 PROCEDURE — 82310 ASSAY OF CALCIUM: CPT | Performed by: INTERNAL MEDICINE

## 2024-09-05 PROCEDURE — 250N000011 HC RX IP 250 OP 636: Performed by: INTERNAL MEDICINE

## 2024-09-05 PROCEDURE — 99207 PR NO CHARGE LOS: CPT

## 2024-09-05 PROCEDURE — 96374 THER/PROPH/DIAG INJ IV PUSH: CPT

## 2024-09-05 PROCEDURE — 82565 ASSAY OF CREATININE: CPT | Performed by: INTERNAL MEDICINE

## 2024-09-05 PROCEDURE — 36415 COLL VENOUS BLD VENIPUNCTURE: CPT | Performed by: INTERNAL MEDICINE

## 2024-09-05 PROCEDURE — 258N000003 HC RX IP 258 OP 636: Performed by: INTERNAL MEDICINE

## 2024-09-05 RX ORDER — HEPARIN SODIUM (PORCINE) LOCK FLUSH IV SOLN 100 UNIT/ML 100 UNIT/ML
5 SOLUTION INTRAVENOUS
Status: CANCELLED | OUTPATIENT
Start: 2025-09-05

## 2024-09-05 RX ORDER — EPINEPHRINE 1 MG/ML
0.3 INJECTION, SOLUTION INTRAMUSCULAR; SUBCUTANEOUS EVERY 5 MIN PRN
OUTPATIENT
Start: 2025-09-05

## 2024-09-05 RX ORDER — DIPHENHYDRAMINE HYDROCHLORIDE 50 MG/ML
50 INJECTION INTRAMUSCULAR; INTRAVENOUS
Status: CANCELLED
Start: 2025-09-05

## 2024-09-05 RX ORDER — MEPERIDINE HYDROCHLORIDE 25 MG/ML
25 INJECTION INTRAMUSCULAR; INTRAVENOUS; SUBCUTANEOUS EVERY 30 MIN PRN
OUTPATIENT
Start: 2025-09-05

## 2024-09-05 RX ORDER — ALBUTEROL SULFATE 90 UG/1
1-2 AEROSOL, METERED RESPIRATORY (INHALATION)
Status: CANCELLED
Start: 2025-09-05

## 2024-09-05 RX ORDER — EPINEPHRINE 1 MG/ML
0.3 INJECTION, SOLUTION INTRAMUSCULAR; SUBCUTANEOUS EVERY 5 MIN PRN
Status: CANCELLED | OUTPATIENT
Start: 2025-09-05

## 2024-09-05 RX ORDER — ZOLEDRONIC ACID 5 MG/100ML
5 INJECTION, SOLUTION INTRAVENOUS ONCE
Start: 2025-09-05

## 2024-09-05 RX ORDER — HEPARIN SODIUM (PORCINE) LOCK FLUSH IV SOLN 100 UNIT/ML 100 UNIT/ML
5 SOLUTION INTRAVENOUS
OUTPATIENT
Start: 2025-09-05

## 2024-09-05 RX ORDER — HEPARIN SODIUM,PORCINE 10 UNIT/ML
5-20 VIAL (ML) INTRAVENOUS DAILY PRN
OUTPATIENT
Start: 2025-09-05

## 2024-09-05 RX ORDER — ZOLEDRONIC ACID 5 MG/100ML
5 INJECTION, SOLUTION INTRAVENOUS ONCE
Status: COMPLETED | OUTPATIENT
Start: 2024-09-05 | End: 2024-09-05

## 2024-09-05 RX ORDER — ALBUTEROL SULFATE 90 UG/1
1-2 AEROSOL, METERED RESPIRATORY (INHALATION)
Start: 2025-09-05

## 2024-09-05 RX ORDER — ALBUTEROL SULFATE 0.83 MG/ML
2.5 SOLUTION RESPIRATORY (INHALATION)
Status: CANCELLED | OUTPATIENT
Start: 2025-09-05

## 2024-09-05 RX ORDER — ZOLEDRONIC ACID 5 MG/100ML
5 INJECTION, SOLUTION INTRAVENOUS ONCE
Status: CANCELLED
Start: 2025-09-05

## 2024-09-05 RX ORDER — ALBUTEROL SULFATE 0.83 MG/ML
2.5 SOLUTION RESPIRATORY (INHALATION)
OUTPATIENT
Start: 2025-09-05

## 2024-09-05 RX ORDER — MEPERIDINE HYDROCHLORIDE 25 MG/ML
25 INJECTION INTRAMUSCULAR; INTRAVENOUS; SUBCUTANEOUS EVERY 30 MIN PRN
Status: CANCELLED | OUTPATIENT
Start: 2025-09-05

## 2024-09-05 RX ORDER — METHYLPREDNISOLONE SODIUM SUCCINATE 125 MG/2ML
125 INJECTION, POWDER, LYOPHILIZED, FOR SOLUTION INTRAMUSCULAR; INTRAVENOUS
Start: 2025-09-05

## 2024-09-05 RX ORDER — DIPHENHYDRAMINE HYDROCHLORIDE 50 MG/ML
50 INJECTION INTRAMUSCULAR; INTRAVENOUS
Start: 2025-09-05

## 2024-09-05 RX ORDER — HEPARIN SODIUM,PORCINE 10 UNIT/ML
5-20 VIAL (ML) INTRAVENOUS DAILY PRN
Status: CANCELLED | OUTPATIENT
Start: 2025-09-05

## 2024-09-05 RX ORDER — METHYLPREDNISOLONE SODIUM SUCCINATE 125 MG/2ML
125 INJECTION, POWDER, LYOPHILIZED, FOR SOLUTION INTRAMUSCULAR; INTRAVENOUS
Status: CANCELLED
Start: 2025-09-05

## 2024-09-05 RX ADMIN — ZOLEDRONIC ACID 5 MG: 5 INJECTION, SOLUTION INTRAVENOUS at 09:28

## 2024-09-05 RX ADMIN — SODIUM CHLORIDE 250 ML: 9 INJECTION, SOLUTION INTRAVENOUS at 09:23

## 2024-09-05 NOTE — PROGRESS NOTES
Infusion Nursing Note:  Karis Youssef presents today for Reclast.    Patient seen by provider today: No   present during visit today: Not Applicable.    Note: Patient reports she has tolerated last few infusions well. Taking calcium and vitamin D as prescribed. Denies new dental concerns today. See flowsheets for assessment.    Intravenous Access:  Peripheral IV placed.    Treatment Conditions:  Creatinine: 0.70  Calcium: 9.6  Results reviewed, labs MET treatment parameters, ok to proceed with treatment.    Post Infusion Assessment:  Patient tolerated infusion without incident.  Site patent and intact, free from redness, edema or discomfort.  No evidence of extravasations.  Access discontinued per protocol.     Discharge Plan:   Future appts have been reviewed and crosschecked with appt note and plan.  AVS to patient via [employer].  Patient will return at MD direction for next appointment.   Patient discharged in stable condition accompanied by: self.  Departure Mode: Ambulatory.      Katrin Holcomb RN BSN OCN

## 2024-09-18 ENCOUNTER — TRANSFERRED RECORDS (OUTPATIENT)
Dept: HEALTH INFORMATION MANAGEMENT | Facility: CLINIC | Age: 74
End: 2024-09-18
Payer: COMMERCIAL

## 2024-09-26 ENCOUNTER — IMMUNIZATION (OUTPATIENT)
Dept: FAMILY MEDICINE | Facility: CLINIC | Age: 74
End: 2024-09-26
Payer: COMMERCIAL

## 2024-09-26 DIAGNOSIS — Z23 HIGH PRIORITY FOR 2019-NCOV VACCINE: Primary | ICD-10-CM

## 2024-09-26 PROCEDURE — 90480 ADMN SARSCOV2 VAC 1/ONLY CMP: CPT

## 2024-09-26 PROCEDURE — 91320 SARSCV2 VAC 30MCG TRS-SUC IM: CPT

## 2024-10-09 ENCOUNTER — TRANSFERRED RECORDS (OUTPATIENT)
Dept: HEALTH INFORMATION MANAGEMENT | Facility: CLINIC | Age: 74
End: 2024-10-09
Payer: COMMERCIAL

## 2024-10-28 ENCOUNTER — TRANSFERRED RECORDS (OUTPATIENT)
Dept: HEALTH INFORMATION MANAGEMENT | Facility: CLINIC | Age: 74
End: 2024-10-28
Payer: COMMERCIAL

## 2025-01-27 SDOH — HEALTH STABILITY: PHYSICAL HEALTH: ON AVERAGE, HOW MANY MINUTES DO YOU ENGAGE IN EXERCISE AT THIS LEVEL?: 30 MIN

## 2025-01-27 SDOH — HEALTH STABILITY: PHYSICAL HEALTH: ON AVERAGE, HOW MANY DAYS PER WEEK DO YOU ENGAGE IN MODERATE TO STRENUOUS EXERCISE (LIKE A BRISK WALK)?: 4 DAYS

## 2025-01-27 ASSESSMENT — SOCIAL DETERMINANTS OF HEALTH (SDOH): HOW OFTEN DO YOU GET TOGETHER WITH FRIENDS OR RELATIVES?: ONCE A WEEK

## 2025-01-30 ENCOUNTER — OFFICE VISIT (OUTPATIENT)
Dept: FAMILY MEDICINE | Facility: CLINIC | Age: 75
End: 2025-01-30
Attending: NURSE PRACTITIONER
Payer: COMMERCIAL

## 2025-01-30 VITALS
HEIGHT: 64 IN | BODY MASS INDEX: 34.72 KG/M2 | WEIGHT: 203.4 LBS | TEMPERATURE: 97.5 F | SYSTOLIC BLOOD PRESSURE: 114 MMHG | HEART RATE: 80 BPM | RESPIRATION RATE: 16 BRPM | DIASTOLIC BLOOD PRESSURE: 72 MMHG | OXYGEN SATURATION: 95 %

## 2025-01-30 DIAGNOSIS — Z13.1 SCREENING FOR DIABETES MELLITUS: ICD-10-CM

## 2025-01-30 DIAGNOSIS — M25.561 CHRONIC PAIN OF BOTH KNEES: ICD-10-CM

## 2025-01-30 DIAGNOSIS — R21 RASH: ICD-10-CM

## 2025-01-30 DIAGNOSIS — G89.29 CHRONIC PAIN OF BOTH KNEES: ICD-10-CM

## 2025-01-30 DIAGNOSIS — M25.562 CHRONIC PAIN OF BOTH KNEES: ICD-10-CM

## 2025-01-30 DIAGNOSIS — Z13.220 SCREENING CHOLESTEROL LEVEL: ICD-10-CM

## 2025-01-30 DIAGNOSIS — Z13.29 SCREENING FOR THYROID DISORDER: ICD-10-CM

## 2025-01-30 DIAGNOSIS — Z00.00 ENCOUNTER FOR MEDICARE ANNUAL WELLNESS EXAM: Primary | ICD-10-CM

## 2025-01-30 DIAGNOSIS — Z23 NEED FOR SHINGLES VACCINE: ICD-10-CM

## 2025-01-30 LAB
BASOPHILS # BLD AUTO: 0.1 10E3/UL (ref 0–0.2)
BASOPHILS NFR BLD AUTO: 1 %
CHOLEST SERPL-MCNC: 231 MG/DL
EOSINOPHIL # BLD AUTO: 0.3 10E3/UL (ref 0–0.7)
EOSINOPHIL NFR BLD AUTO: 4 %
ERYTHROCYTE [DISTWIDTH] IN BLOOD BY AUTOMATED COUNT: 12.9 % (ref 10–15)
FASTING STATUS PATIENT QL REPORTED: YES
FASTING STATUS PATIENT QL REPORTED: YES
GLUCOSE SERPL-MCNC: 105 MG/DL (ref 70–99)
HCT VFR BLD AUTO: 41.3 % (ref 35–47)
HDLC SERPL-MCNC: 74 MG/DL
HGB BLD-MCNC: 13.1 G/DL (ref 11.7–15.7)
IMM GRANULOCYTES # BLD: 0 10E3/UL
IMM GRANULOCYTES NFR BLD: 0 %
LDLC SERPL CALC-MCNC: 145 MG/DL
LYMPHOCYTES # BLD AUTO: 2.9 10E3/UL (ref 0.8–5.3)
LYMPHOCYTES NFR BLD AUTO: 43 %
MCH RBC QN AUTO: 29 PG (ref 26.5–33)
MCHC RBC AUTO-ENTMCNC: 31.7 G/DL (ref 31.5–36.5)
MCV RBC AUTO: 91 FL (ref 78–100)
MONOCYTES # BLD AUTO: 0.6 10E3/UL (ref 0–1.3)
MONOCYTES NFR BLD AUTO: 9 %
NEUTROPHILS # BLD AUTO: 2.9 10E3/UL (ref 1.6–8.3)
NEUTROPHILS NFR BLD AUTO: 43 %
NONHDLC SERPL-MCNC: 157 MG/DL
PLATELET # BLD AUTO: 302 10E3/UL (ref 150–450)
RBC # BLD AUTO: 4.52 10E6/UL (ref 3.8–5.2)
TRIGL SERPL-MCNC: 61 MG/DL
TSH SERPL DL<=0.005 MIU/L-ACNC: 1.98 UIU/ML (ref 0.3–4.2)
WBC # BLD AUTO: 6.7 10E3/UL (ref 4–11)

## 2025-01-30 NOTE — PATIENT INSTRUCTIONS
Patient Education   Preventive Care Advice   This is general advice given by our system to help you stay healthy. However, your care team may have specific advice just for you. Please talk to your care team about your preventive care needs.  Nutrition  Eat 5 or more servings of fruits and vegetables each day.  Try wheat bread, brown rice and whole grain pasta (instead of white bread, rice, and pasta).  Get enough calcium and vitamin D. Check the label on foods and aim for 100% of the RDA (recommended daily allowance).  Lifestyle  Exercise at least 150 minutes each week  (30 minutes a day, 5 days a week).  Do muscle strengthening activities 2 days a week. These help control your weight and prevent disease.  No smoking.  Wear sunscreen to prevent skin cancer.  Have a dental exam and cleaning every 6 months.  Yearly exams  See your health care team every year to talk about:  Any changes in your health.  Any medicines your care team has prescribed.  Preventive care, family planning, and ways to prevent chronic diseases.  Shots (vaccines)   HPV shots (up to age 26), if you've never had them before.  Hepatitis B shots (up to age 59), if you've never had them before.  COVID-19 shot: Get this shot when it's due.  Flu shot: Get a flu shot every year.  Tetanus shot: Get a tetanus shot every 10 years.  Pneumococcal, hepatitis A, and RSV shots: Ask your care team if you need these based on your risk.  Shingles shot (for age 50 and up)  General health tests  Diabetes screening:  Starting at age 35, Get screened for diabetes at least every 3 years.  If you are younger than age 35, ask your care team if you should be screened for diabetes.  Cholesterol test: At age 39, start having a cholesterol test every 5 years, or more often if advised.  Bone density scan (DEXA): At age 50, ask your care team if you should have this scan for osteoporosis (brittle bones).  Hepatitis C: Get tested at least once in your life.  STIs (sexually  transmitted infections)  Before age 24: Ask your care team if you should be screened for STIs.  After age 24: Get screened for STIs if you're at risk. You are at risk for STIs (including HIV) if:  You are sexually active with more than one person.  You don't use condoms every time.  You or a partner was diagnosed with a sexually transmitted infection.  If you are at risk for HIV, ask about PrEP medicine to prevent HIV.  Get tested for HIV at least once in your life, whether you are at risk for HIV or not.  Cancer screening tests  Cervical cancer screening: If you have a cervix, begin getting regular cervical cancer screening tests starting at age 21.  Breast cancer scan (mammogram): If you've ever had breasts, begin having regular mammograms starting at age 40. This is a scan to check for breast cancer.  Colon cancer screening: It is important to start screening for colon cancer at age 45.  Have a colonoscopy test every 10 years (or more often if you're at risk) Or, ask your provider about stool tests like a FIT test every year or Cologuard test every 3 years.  To learn more about your testing options, visit:   .  For help making a decision, visit:   https://bit.ly/nd58167.  Prostate cancer screening test: If you have a prostate, ask your care team if a prostate cancer screening test (PSA) at age 55 is right for you.  Lung cancer screening: If you are a current or former smoker ages 50 to 80, ask your care team if ongoing lung cancer screenings are right for you.  For informational purposes only. Not to replace the advice of your health care provider. Copyright   2023 [facility]. All rights reserved. Clinically reviewed by the [facility]. [employer] [id number] - REV 01/24.  Learning About Activities of Daily Living  What are activities of daily living?     Activities of daily living (ADLs) are the basic self-care tasks you do every day. These include eating, bathing, dressing,  and moving around.  As you age, and if you have health problems, you may find that it's harder to do some of these tasks. If so, your doctor can suggest ideas that may help.  To measure what kind of help you may need, your doctor will ask how well you are able to do ADLs. Let your doctor know if there are any tasks that you are having trouble doing. This is an important first step to getting help. And when you have the help you need, you can stay as independent as possible.  How will a doctor assess your ADLs?  Asking about ADLs is part of a routine health checkup your doctor will likely do as you age. Your health check might be done in a doctor's office, in your home, or at a hospital. The goal is to find out if you are having any problems that could make it hard to care for yourself or that make it unsafe for you to be on your own.  To measure your ADLs, your doctor will ask how hard it is for you to do routine tasks. Your doctor may also want to know if you have changed the way you do a task because of a health problem. Your doctor may watch how you:  Walk back and forth.  Keep your balance while you stand or walk.  Move from sitting to standing or from a bed to a chair.  Button or unbutton a shirt or sweater.  Remove and put on your shoes.  It's common to feel a little worried or anxious if you find you can't do all the things you used to be able to do. Talking with your doctor about ADLs is a way to make sure you're as safe as possible and able to care for yourself as well as you can. You may want to bring a caregiver, friend, or family member to your checkup. They can help you talk to your doctor.  Follow-up care is a key part of your treatment and safety. Be sure to make and go to all appointments, and call your doctor if you are having problems. It's also a good idea to know your test results and keep a list of the medicines you take.  Current as of: October 24, 2023  Content Version: 14.3    2024 [facility]  [employer].   Care instructions adapted under license by your healthcare professional. If you have questions about a medical condition or this instruction, always ask your healthcare professional. [facility] [employer] disclaims any warranty or liability for your use of this information.    Learning About Sleeping Well  What does sleeping well mean?     Sleeping well means getting enough sleep to feel good and stay healthy. How much sleep is enough varies among people.  The number of hours you sleep and how you feel when you wake up are both important. If you do not feel refreshed, you probably need more sleep. Another sign of not getting enough sleep is feeling tired during the day.  Experts recommend that adults get at least 7 or more hours of sleep per day. Children and older adults need more sleep.  Why is getting enough sleep important?  Getting enough quality sleep is a basic part of good health. When your sleep suffers, your physical health, mood, and your thoughts can suffer too. You may find yourself feeling more grumpy or stressed. Not getting enough sleep also can lead to serious problems, including injury, accidents, anxiety, and depression.  What might cause poor sleeping?  Many things can cause sleep problems, including:  Changes to your sleep schedule.  Stress. Stress can be caused by fear about a single event, such as giving a speech. Or you may have ongoing stress, such as worry about work or school.  Depression, anxiety, and other mental or emotional conditions.  Changes in your sleep habits or surroundings. This includes changes that happen where you sleep, such as noise, light, or sleeping in a different bed. It also includes changes in your sleep pattern, such as having jet lag or working a late shift.  Health problems, such as pain, breathing problems, and restless legs syndrome.  Lack of regular exercise.  Using alcohol, nicotine, or caffeine before bed.  How can you help yourself?  Here  "are some tips that may help you sleep more soundly and wake up feeling more refreshed.  Your sleeping area   Use your bedroom only for sleeping and sex. A bit of light reading may help you fall asleep. But if it doesn't, do your reading elsewhere in the house. Try not to use your TV, computer, smartphone, or tablet while you are in bed.  Be sure your bed is big enough to stretch out comfortably, especially if you have a sleep partner.  Keep your bedroom quiet, dark, and cool. Use curtains, blinds, or a sleep mask to block out light. To block out noise, use earplugs, soothing music, or a \"white noise\" machine.  Your evening and bedtime routine   Create a relaxing bedtime routine. You might want to take a warm shower or bath, or listen to soothing music.  Go to bed at the same time every night. And get up at the same time every morning, even if you feel tired.  What to avoid   Limit caffeine (coffee, tea, caffeinated sodas) during the day, and don't have any for at least 6 hours before bedtime.  Avoid drinking alcohol before bedtime. Alcohol can cause you to wake up more often during the night.  Try not to smoke or use tobacco, especially in the evening. Nicotine can keep you awake.  Limit naps during the day, especially close to bedtime.  Avoid lying in bed awake for too long. If you can't fall asleep or if you wake up in the middle of the night and can't get back to sleep within about 20 minutes, get out of bed and go to another room until you feel sleepy.  Avoid taking medicine right before bed that may keep you awake or make you feel hyper or energized. Your doctor can tell you if your medicine may do this and if you can take it earlier in the day.  If you can't sleep   Imagine yourself in a peaceful, pleasant scene. Focus on the details and feelings of being in a place that is relaxing.  Get up and do a quiet or boring activity until you feel sleepy.  Avoid drinking any liquids before going to bed to help prevent " "waking up often to use the bathroom.  Where can you learn more?  Go to https://www.[employer].net/patiented  Enter J942 in the search box to learn more about \"Learning About Sleeping Well.\"  Current as of: July 31, 2024  Content Version: 14.3    2024 [employer].   Care instructions adapted under license by your healthcare professional. If you have questions about a medical condition or this instruction, always ask your healthcare professional. [employer] disclaims any warranty or liability for your use of this information.    Bladder Training: Care Instructions  Your Care Instructions     Bladder training is used to treat urge incontinence and stress incontinence. Urge incontinence means that the need to urinate comes on so fast that you can't get to a toilet in time. Stress incontinence means that you leak urine because of pressure on your bladder. For example, it may happen when you laugh, cough, or lift something heavy.  Bladder training can increase how long you can wait before you have to urinate. It can also help your bladder hold more urine. And it can give you better control over the urge to urinate.  It is important to remember that bladder training takes a few weeks to a few months to make a difference. You may not see results right away, but don't give up.  Follow-up care is a key part of your treatment and safety. Be sure to make and go to all appointments, and call your doctor if you are having problems. It's also a good idea to know your test results and keep a list of the medicines you take.  How can you care for yourself at home?  Work with your doctor to come up with a bladder training program that is right for you. You may use one or more of the following methods.  Delayed urination  In the beginning, try to keep from urinating for 5 minutes after you first feel the need to go.  While you wait, take deep, slow breaths to relax. Kegel exercises can also help you delay the " "need to go to the bathroom.  After some practice, when you can easily wait 5 minutes to urinate, try to wait 10 minutes before you urinate.  Slowly increase the waiting period until you are able to control when you have to urinate.  Scheduled urination  Empty your bladder when you first wake up in the morning.  Schedule times throughout the day when you will urinate.  Start by going to the bathroom every hour, even if you don't need to go.  Slowly increase the time between trips to the bathroom.  When you have found a schedule that works well for you, keep doing it.  If you wake up during the night and have to urinate, do it. Apply your schedule to waking hours only.  Kegel exercises  These tighten and strengthen pelvic muscles, which can help you control the flow of urine. (If doing these exercises causes pain, stop doing them and talk with your doctor.) To do Kegel exercises:  Squeeze your muscles as if you were trying not to pass gas. Or squeeze your muscles as if you were stopping the flow of urine. Your belly, legs, and buttocks shouldn't move.  Hold the squeeze for 3 seconds, then relax for 5 to 10 seconds.  Start with 3 seconds, then add 1 second each week until you are able to squeeze for 10 seconds.  Repeat the exercise 10 times a session. Do 3 to 8 sessions a day.  When should you call for help?  Watch closely for changes in your health, and be sure to contact your doctor if:    Your incontinence is getting worse.     You do not get better as expected.   Where can you learn more?  Go to https://www.[employer].net/patiented  Enter V684 in the search box to learn more about \"Bladder Training: Care Instructions.\"  Current as of: April 30, 2024  Content Version: 14.3    2024 [employer].   Care instructions adapted under license by your healthcare professional. If you have questions about a medical condition or this instruction, always ask your healthcare professional. [employer] " disclaims any warranty or liability for your use of this information.

## 2025-01-30 NOTE — PROGRESS NOTES
"Preventive Care Visit  [facility] ABDOUL WHITTINGTON, Family Medicine  Jan 30, 2025      Assessment & Plan     Encounter for Medicare annual wellness exam      Need for shingles vaccine  Advised can get at the pharmacy if wanting    Rash   has been treating as needed with topical triamcinolone ointment, rash does resolve with treatment.  Will check CBC to make sure this is normal and not contributing to rash  - CBC with platelets and differential; Future  - CBC with platelets and differential    Screening for diabetes mellitus    - Glucose; Future  - Glucose    Screening for thyroid disorder    - TSH with free T4 reflex; Future  - TSH with free T4 reflex    Screening cholesterol level    - Lipid panel reflex to direct LDL Fasting; Future  - Lipid panel reflex to direct LDL Fasting  Mental health stable, she reports  Patient has been advised of split billing requirements and indicates understanding: Yes        BMI  Estimated body mass index is 35.15 kg/m  as calculated from the following:    Height as of this encounter: 1.62 m (5' 3.78\").    Weight as of this encounter: 92.3 kg (203 lb 6.4 oz).   Weight management plan: Discussed healthy diet and exercise guidelines walks regularly for exercise, has new puppy. Going up and down stairs more now, increased bilateral knee pain. Treating with Voltaren effectively. Has been told she is a candidate for bilateral knee replacement; wanting to avoid if possible.     Counseling  Appropriate preventive services were addressed with this patient via screening, questionnaire, or discussion as appropriate for fall prevention, nutrition, physical activity, Tobacco-use cessation, social engagement, weight loss and cognition.  Checklist reviewing preventive services available has been given to the patient.  Reviewed patient's diet, addressing concerns and/or questions.   Discussed possible causes of fatigue. Patient reported safety concerns were " addressed today.Information on urinary incontinence and treatment options given to patient.       Work on weight loss  Regular exercise  See Patient Instructions    Subjective   Karis is a 74 year old, presenting for the following:  Physical (Has advanced care directive at home, may bring copy to clinic.)    MH stable, she reports fall in September where she had left wrist fracture. Treated by orthopedics and has resolved.  Recent trip to [place], [place], [place].  Had pulmonology testing last year still has some shortness of breath.  Walking regularly for exercise has Philip puppy.  Recurrent red raised rash to arms and chest, resolves with triamcinolone cream and then reoccurs.  Rash is not itchy.  Denies night sweats, unexplained weight loss.  Will check CBC.  Does hydrate skin regularly due to history of Sjogren's.  In agreement to screening labs.        1/30/2025     8:15 AM   Additional Questions   Roomed by Thais WHITAKER          Health Care Directive  Patient does not have a Health Care Directive: Discussed advance care planning with patient; information given to patient to review.      1/27/2025   General Health   How would you rate your overall physical health? Good   Feel stress (tense, anxious, or unable to sleep) Only a little   (!) STRESS CONCERN      1/27/2025   Nutrition   Diet: Other   If other, please elaborate: No red meat for 35 years.         1/27/2025   Exercise   Days per week of moderate/strenous exercise 4 days   Average minutes spent exercising at this level 30 min         1/27/2025   Social Factors   Frequency of gathering with friends or relatives Once a week   Worry food won't last until get money to buy more No    No   Food not last or not have enough money for food? No    No   Do you have housing? (Housing is defined as stable permanent housing and does not include staying ouside in a car, in a tent, in an abandoned building, in an overnight shelter, or couch-surfing.) Yes     Yes   Are you worried about losing your housing? No    No   Lack of transportation? No    No   Unable to get utilities (heat,electricity)? No    No       Multiple values from one day are sorted in reverse-chronological order         2025   Fall Risk   Fallen 2 or more times in the past year? No    No   Trouble with walking or balance? No    No       Multiple values from one day are sorted in reverse-chronological order          2025   Activities of Daily Living- Home Safety   Needs help with the following daily activites None of the above   Safety concerns in the home No grab bars in the bathroom         2025   Dental   Dentist two times every year? Yes         2025   Hearing Screening   Hearing concerns? None of the above         2025   Driving Risk Screening   Patient/family members have concerns about driving No         2025   General Alertness/Fatigue Screening   Have you been more tired than usual lately? (!) YES         2025   Urinary Incontinence Screening   Bothered by leaking urine in past 6 months Yes         2025   TB Screening   Were you born outside of the [place]? No         Today's PHQ-2 Score:       2025     8:08 AM   PHQ-2 (  [employer])   Q1: Little interest or pleasure in doing things 0   Q2: Feeling down, depressed or hopeless 0   PHQ-2 Score 0    Q1: Little interest or pleasure in doing things Not at all   Q2: Feeling down, depressed or hopeless Not at all   PHQ-2 Score 0       Patient-reported           2025   Substance Use   Alcohol more than 3/day or more than 7/wk No   Do you have a current opioid prescription? No   How severe/bad is pain from 1 to 10? 2/10   Do you use any other substances recreationally? No     Social History     Tobacco Use    Smoking status: Former     Current packs/day: 0.00     Types: Cigarettes     Quit date: 1984     Years since quittin.1     Passive exposure: Past    Smokeless tobacco: Never    Tobacco  comments:     nonsmoking hosuehold   Vaping Use    Vaping status: Never Used   Substance Use Topics    Alcohol use: Yes     Comment: 1 glass of wine one/two times a week    Drug use: No           2/2/2024   LAST FHS-7 RESULTS   1st degree relative breast or ovarian cancer Yes   Any relative bilateral breast cancer No   Any male have breast cancer No   Any ONE woman have BOTH breast AND ovarian cancer No   Any woman with breast cancer before 50yrs Yes   2 or more relatives with breast AND/OR ovarian cancer No   2 or more relatives with breast AND/OR bowel cancer No        Mammogram Screening - Mammogram every 1-2 years updated in Health Maintenance based on mutual decision making    ASCVD Risk   The 10-year ASCVD risk score (Clint ADAMSON, et al., 2019) is: 9.5%    Values used to calculate the score:      Age: 74 years      Sex: Female      Is Non- : No      Diabetic: No      Tobacco smoker: No      Systolic Blood Pressure: 114 mmHg      Is BP treated: No      HDL Cholesterol: 63 mg/dL      Total Cholesterol: 217 mg/dL            Reviewed and updated as needed this visit by Provider   Tobacco  Allergies  Meds  Problems  Med Hx  Surg Hx  Fam Hx            Past Medical History:   Diagnosis Date    Abnormal Pap smear 1995    had colpo 1995-hyperplasia, nl since    Arthritis     COPD (chronic obstructive pulmonary disease) (H)     Disturbance of skin sensation     Osteopenia     Osteopenia     Seasonal allergies     Sjogren's syndrome      Past Surgical History:   Procedure Laterality Date    BIOPSY  2000    right breast lumpectomy    BREAST LUMPECTOMY, RT/LT      right breast, benign    CATARACT IOL, RT/LT      bilateral    COLONOSCOPY N/A 5/12/2023    Procedure: COLONOSCOPY, WITH POLYPECTOMY AND BIOPSY;  Surgeon: Geneva Newton DO;  Location: [facility] OR    COLONOSCOPY N/A 5/12/2023    Procedure: COLONOSCOPY, FLEXIBLE, WITH LESION REMOVAL USING SNARE;  Surgeon: Geneva Newton DO;   Location: [facility] OR    COLONOSCOPY WITH CO2 INSUFFLATION N/A 2023    Procedure: Colonoscopy with CO2 insufflation;  Surgeon: Geneva Newton DO;  Location: [facility] OR    ORTHOPEDIC SURGERY  2018    right radius      OB History    Para Term  AB Living   2 2 2 0 0 2   SAB IAB Ectopic Multiple Live Births   0 0 0 0 2      # Outcome Date GA Lbr Harmeet/2nd Weight Sex Type Anes PTL Lv   2 Term  40w0d       DARYL   1 Term  40w0d       DARYL     Lab work is in process  Labs reviewed in EPIC  BP Readings from Last 3 Encounters:   25 114/72   24 117/76   24 (!) 166/84    Wt Readings from Last 3 Encounters:   25 92.3 kg (203 lb 6.4 oz)   24 97.5 kg (215 lb)   24 97.1 kg (214 lb)                  Patient Active Problem List   Diagnosis    Thigh pain    Osteopenia    Sjogren's syndrome    CARDIOVASCULAR SCREENING; LDL GOAL LESS THAN 160    Palpitations    Acute conjunctivitis    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    Choroidal nevus, left    Acute pain of left shoulder    Displaced fracture of neck of right radius    Compression fracture of T9 vertebra, initial encounter (H)    Age-related osteoporosis with current pathological fracture with routine healing    Shortness of breath on exertion    Abnormal finding on lung imaging    History of bisphosphonate therapy     Past Surgical History:   Procedure Laterality Date    BIOPSY      right breast lumpectomy    BREAST LUMPECTOMY, RT/LT      right breast, benign    CATARACT IOL, RT/LT      bilateral    COLONOSCOPY N/A 2023    Procedure: COLONOSCOPY, WITH POLYPECTOMY AND BIOPSY;  Surgeon: Geneva Newton DO;  Location: [facility] OR    COLONOSCOPY N/A 2023    Procedure: COLONOSCOPY, FLEXIBLE, WITH LESION REMOVAL USING SNARE;  Surgeon: Geneva Newton DO;  Location: [facility] OR    COLONOSCOPY WITH CO2 INSUFFLATION N/A 2023    Procedure: Colonoscopy with CO2 insufflation;  Surgeon: Geneva Newton DO;  Location: [facility]  OR    ORTHOPEDIC SURGERY  2018    right radius        Social History     Tobacco Use    Smoking status: Former     Current packs/day: 0.00     Types: Cigarettes     Quit date: 1984     Years since quittin.1     Passive exposure: Past    Smokeless tobacco: Never    Tobacco comments:     nonsmoking nadia   Substance Use Topics    Alcohol use: Yes     Comment: 1 glass of wine one/two times a week     Family History   Problem Relation Age of Onset    C.A.D. Mother     Osteoporosis Mother     Hypertension Mother     Heart Disease Mother         CAD, CHF    Coronary Artery Disease Mother     Hyperlipidemia Mother     Diabetes Father     Hypertension Father     Cancer Brother         liver    Diabetes Brother     Diabetes Sister     Breast Cancer Sister 50    Hypertension Sister     Cancer Sister 72        non-hodgkins lymphoma    Cerebrovascular Disease Maternal Grandmother     Diabetes Paternal Grandfather     Diabetes Daughter         type 2    Connective Tissue Disorder Other         2 cousins and niece with MS; aunt with lupus    Genetic Disorder Other         Lupus    Rheumatoid Arthritis Cousin     Genetic Disorder Cousin         MS    Diabetes Niece     Hyperlipidemia Sister     Hypertension Sister     Breast Cancer Sister     Other Cancer Brother         Liver    Diabetes Brother     Other Cancer Sister         non-hodgkins lymphoma    Thyroid Disease Sister     Diabetes Sister     Hypertension Sister     Osteoporosis Sister     Cerebrovascular Disease Sister     Hypertension Sister     Asthma Nephew     Asthma Other     Genetic Disorder Cousin         MS    Genetic Disorder Niece         MS/Lupus         Current Outpatient Medications   Medication Sig Dispense Refill    Calcium Carbonate-Vitamin D (CALCIUM 600 + D OR) Take 1,200 mg by mouth daily       carboxymethylcellulose (CELLUVISC/REFRESH LIQUIGEL) 1 % ophthalmic solution Place 1 drop into both eyes 3 times daily      cetirizine (ZYRTEC) 10  MG tablet Take 10 mg by mouth daily      diclofenac (VOLTAREN) 1 % topical gel Place onto the skin 4 times daily 100 g 6    fluticasone (FLONASE) 50 MCG/ACT nasal spray       MAGNESIUM PO Take 250 mg by mouth daily       Multiple Vitamins-Minerals (ICAPS AREDS 2 PO) Take 1 tablet by mouth 2 times daily      NEW MED Nettle tincture      Polypodium Leucotomos (HELIOCARE) 240 MG CAPS       SUDAFED 30 MG OR TABS Take by mouth.      VITAMIN C 500 MG OR TABS       Vitamin D, Cholecalciferol, 25 MCG (1000 UT) TABS daily       VITAMIN E 400 UNIT PO CAPS       zoledronic Acid (RECLAST) 5 MG/100ML SOLN infusion        Allergies   Allergen Reactions    Erythromycin Nausea     Tongue turned black.    Nsaids GI Disturbance     Gastritis        Recent Labs   Lab Test 09/05/24  0827 07/08/24  1037 01/23/24  0804 07/10/23  1059 01/17/23  0759 06/02/22  1326 12/22/21  0917 06/03/21  1328 10/08/19  1003 02/19/19  0951   0000   LDL  --   --  135*  --  124*  --  139*  --    < >  --   --    HDL  --   --  63  --  68  --  73  --    < >  --   --    TRIG  --   --  93  --  90  --  114  --    < >  --   --    ALT  --  17  --  16  --   --   --   --   --  28  --    CR 0.70 0.61  --  0.57  --    < >  --  0.75  --  0.64  --    GFRESTIMATED >90 >90  --  >90  --    < >  --  81  --  >90  --    GFRESTBLACK  --   --   --   --   --   --   --  >90  --  >90  --    POTASSIUM  --  4.4  --  4.4  --   --   --   --   --  4.4  --    TSH  --   --  4.05  --  4.68*  --  2.68  --   --   --    < >    < > = values in this interval not displayed.      Current providers sharing in care for this patient include:  Patient Care Team:  Sabrina Matthews NP as PCP - General (Family Medicine)  Finesse Saunders MD as Assigned Rheumatology Provider  Sabrina Matthews NP as Assigned PCP  Mikel Hannah MA as Audiologist (Audiology)  Champ Barlow MD as MD (Otolaryngology)  Tiffanie Leroy MD as MD (Ophthalmology)  Kamari Glaser MD as Physician  (Pulmonary & Critical Care Medicine)  Sabiha Schwartz PA-C as Assigned Neuroscience Provider  Kamari Glaser MD as Assigned Pulmonology Provider  Lucy Gonzalez PA-C as Physician Assistant (Dermatology)  Lucy Gonzalez PA-C as Assigned Surgical Provider    The following health maintenance items are reviewed in Epic and correct as of today:  Health Maintenance   Topic Date Due    ZOSTER IMMUNIZATION (1 of 2) Never done    INFLUENZA VACCINE (1) 09/01/2024    COLORECTAL CANCER SCREENING  05/12/2025    MEDICARE ANNUAL WELLNESS VISIT  01/30/2026    ANNUAL REVIEW OF HM ORDERS  01/30/2026    FALL RISK ASSESSMENT  01/30/2026    MAMMO SCREENING  02/02/2026    RSV VACCINE (1 - 1-dose 75+ series) 04/19/2027    GLUCOSE  07/08/2027    LIPID  01/23/2029    ADVANCE CARE PLANNING  01/30/2030    DTAP/TDAP/TD IMMUNIZATION (3 - Td or Tdap) 06/09/2032    DEXA  06/14/2038    HEPATITIS C SCREENING  Completed    PHQ-2 (once per calendar year)  Completed    Pneumococcal Vaccine: 50+ Years  Completed    COVID-19 Vaccine  Completed    HPV IMMUNIZATION  Aged Out    MENINGITIS IMMUNIZATION  Aged Out    RSV MONOCLONAL ANTIBODY  Aged Out         Review of Systems  CONSTITUTIONAL: NEGATIVE for fever, chills, change in weight  INTEGUMENTARY/SKIN: NEGATIVE for worrisome moles or lesions POSITIVE rash  EYES: NEGATIVE for vision changes or irritation  ENT/MOUTH: NEGATIVE for ear, mouth and throat problems  RESP: NEGATIVE for significant cough or SOB  BREAST: NEGATIVE for masses, tenderness or discharge  CV: NEGATIVE for chest pain, palpitations or peripheral edema  GI: NEGATIVE for nausea, abdominal pain, heartburn, or change in bowel habits  : NEGATIVE for frequency, dysuria, or hematuria  MUSCULOSKELETAL: NEGATIVE for significant myalgia POSITIVE bilateral knee pain  NEURO: NEGATIVE for weakness, dizziness or paresthesias  ENDOCRINE: NEGATIVE for temperature intolerance, skin/hair changes  HEME: NEGATIVE for bleeding  "problems  PSYCHIATRIC: NEGATIVE for changes in mood or affect     Objective    Exam  /72   Pulse 80   Temp 97.5  F (36.4  C) (Temporal)   Resp 16   Ht 1.62 m (5' 3.78\")   Wt 92.3 kg (203 lb 6.4 oz)   LMP  (LMP Unknown)   SpO2 95%   BMI 35.15 kg/m     Estimated body mass index is 35.15 kg/m  as calculated from the following:    Height as of this encounter: 1.62 m (5' 3.78\").    Weight as of this encounter: 92.3 kg (203 lb 6.4 oz).    Physical Exam  GENERAL: alert and no distress  EYES: Eyes grossly normal to inspection, PERRL and conjunctivae and sclerae normal  HENT: ear canals and TM's normal, nose and mouth without ulcers or lesions  NECK: no adenopathy, no asymmetry, masses, or scars  RESP: lungs clear to auscultation - no rales, rhonchi or wheezes  BREAST:deferred per guidelines-asymptomatic per patient.  Discussed self breast exam, symptoms to watch out for and when to follow-up.  Discussed mammogram  CV: regular rate and rhythm, normal S1 S2, no S3 or S4, no murmur, click or rub, no peripheral edema  ABDOMEN: soft, nontender, no hepatosplenomegaly, no masses and bowel sounds normal   (female): Deferred per patient no concerns   MS: no gross musculoskeletal defects noted, no edema, no CVA tenderness  SKIN: no suspicious lesions erythematous POSITIVE  raised dry rash to left wrist approx 1\"x1\"  NEURO: Normal strength and tone, cranial nerves intact, mentation intact and speech normal  PSYCH: mentation appears normal, affect normal/bright  LYMPH: no cervical, supraclavicular adenopathy        1/30/2025   Mini Cog   Clock Draw Score 2 Normal   3 Item Recall 3 objects recalled   Mini Cog Total Score 5              Signed Electronically by: ABDOUL WHITING    "

## 2025-02-06 ENCOUNTER — OFFICE VISIT (OUTPATIENT)
Dept: DERMATOLOGY | Facility: CLINIC | Age: 75
End: 2025-02-06
Payer: COMMERCIAL

## 2025-02-06 DIAGNOSIS — L82.1 SEBORRHEIC KERATOSIS: ICD-10-CM

## 2025-02-06 DIAGNOSIS — L20.9 ATOPIC DERMATITIS, UNSPECIFIED TYPE: Primary | ICD-10-CM

## 2025-02-06 ASSESSMENT — PAIN SCALES - GENERAL: PAINLEVEL_OUTOF10: NO PAIN (0)

## 2025-02-06 NOTE — NURSING NOTE
Karis Youssef's chief complaint for this visit includes:  Chief Complaint   Patient presents with    Derm Problem     Patient has been having dry itchy spots on hands, chest, right arm, left side and back that she is using triamcinolone cream.      PCP: Sabrina Matthews    Referring Provider:  Referred Self, MD  No address on file    LMP  (LMP Unknown)   No Pain (0)        Allergies   Allergen Reactions    Erythromycin Nausea     Tongue turned black.    Nsaids GI Disturbance     Gastritis            Do you need any medication refills at today's visit? No    Kanchan Almendarez MA

## 2025-02-06 NOTE — PROGRESS NOTES
HPI:   Chief complaints: Karis Youssef is a pleasant 74 year old female who presents for evaluation of a rash. The rash began about 6 months ago and was initially on her neck and chest. Her PCP gave her TAC cream which was very helpful. She has the rash on her hands; a little on her chest and her right upper arm currently. She has been using thick moisturizers which does help. She started using gloves to protect her hands while washing dishes but the rash is where the gloves touch her hands. Additionally she has a few spots on the back to check      PHYSICAL EXAM:    LMP  (LMP Unknown)   Skin exam performed as follows: Type 2 skin. Mood appropriate  Alert and Oriented X 3. Well developed, well nourished in no distress.  General appearance: Normal  Head including face: Normal  Eyes: conjunctiva and lids: Normal  Mouth: Lips, teeth, gums: Normal  Neck: Normal  Skin: Scalp and body hair: See below    Xerosis and eczematous dermatitis on bilateral dorsal hands, right upper arm; xerosis on the chest  Keratotic papules on the back    ASSESSMENT/PLAN:     Atopic dermatitis on the hands, chest - discussed diagnosis and treatment options  --Continue TAC BID x 1-2 weeks PRN flares  --Thick emollients daily    Seborrheic keratoses on the chest - advised benign no treatment needed.           Follow-up: yearly/PRN sooner  CC:   Scribed By: Lucy Gonzalez MS, PA-C

## 2025-02-06 NOTE — LETTER
2/6/2025      Karis Youssef  [address]  [place] [address]      Dear Colleague,    Thank you for referring your patient, Karis Youssef, to the [facility]. Please see a copy of my visit note below.    HPI:   Chief complaints: Karis Youssef is a pleasant 74 year old female who presents for evaluation of a rash. The rash began about 6 months ago and was initially on her neck and chest. Her PCP gave her TAC cream which was very helpful. She has the rash on her hands; a little on her chest and her right upper arm currently. She has been using thick moisturizers which does help. She started using gloves to protect her hands while washing dishes but the rash is where the gloves touch her hands. Additionally she has a few spots on the back to check      PHYSICAL EXAM:    LMP  (LMP Unknown)   Skin exam performed as follows: Type 2 skin. Mood appropriate  Alert and Oriented X 3. Well developed, well nourished in no distress.  General appearance: Normal  Head including face: Normal  Eyes: conjunctiva and lids: Normal  Mouth: Lips, teeth, gums: Normal  Neck: Normal  Skin: Scalp and body hair: See below    Xerosis and eczematous dermatitis on bilateral dorsal hands, right upper arm; xerosis on the chest  Keratotic papules on the back    ASSESSMENT/PLAN:     Atopic dermatitis on the hands, chest - discussed diagnosis and treatment options  --Continue TAC BID x 1-2 weeks PRN flares  --Thick emollients daily    Seborrheic keratoses on the chest - advised benign no treatment needed.           Follow-up: yearly/PRN sooner  CC:   Scribed By: Lucy Gonzalez, MS, PARoC      Again, thank you for allowing me to participate in the care of your patient.        Sincerely,        Lucy Gonzalez PA-C    Electronically signed

## 2025-02-19 ENCOUNTER — TRANSFERRED RECORDS (OUTPATIENT)
Dept: HEALTH INFORMATION MANAGEMENT | Facility: CLINIC | Age: 75
End: 2025-02-19
Payer: COMMERCIAL

## 2025-03-10 ENCOUNTER — ANCILLARY PROCEDURE (OUTPATIENT)
Dept: MAMMOGRAPHY | Facility: CLINIC | Age: 75
End: 2025-03-10
Attending: NURSE PRACTITIONER
Payer: COMMERCIAL

## 2025-03-10 DIAGNOSIS — Z12.31 VISIT FOR SCREENING MAMMOGRAM: ICD-10-CM

## 2025-03-10 PROCEDURE — 77067 SCR MAMMO BI INCL CAD: CPT | Mod: TC | Performed by: RADIOLOGY

## 2025-03-10 PROCEDURE — 77063 BREAST TOMOSYNTHESIS BI: CPT | Mod: TC | Performed by: RADIOLOGY

## 2025-03-22 ENCOUNTER — HEALTH MAINTENANCE LETTER (OUTPATIENT)
Age: 75
End: 2025-03-22

## 2025-03-27 ENCOUNTER — MYC MEDICAL ADVICE (OUTPATIENT)
Dept: FAMILY MEDICINE | Facility: CLINIC | Age: 75
End: 2025-03-27
Payer: COMMERCIAL

## 2025-03-27 DIAGNOSIS — L29.9 ITCHING: ICD-10-CM

## 2025-03-27 DIAGNOSIS — L30.9 DERMATITIS: ICD-10-CM

## 2025-03-27 RX ORDER — TRIAMCINOLONE ACETONIDE 1 MG/G
OINTMENT TOPICAL 2 TIMES DAILY
Qty: 30 G | Refills: 1 | Status: SHIPPED | OUTPATIENT
Start: 2025-03-27

## 2025-04-08 ENCOUNTER — OFFICE VISIT (OUTPATIENT)
Dept: PULMONOLOGY | Facility: CLINIC | Age: 75
End: 2025-04-08
Attending: INTERNAL MEDICINE
Payer: COMMERCIAL

## 2025-04-08 ENCOUNTER — TELEPHONE (OUTPATIENT)
Dept: PULMONOLOGY | Facility: CLINIC | Age: 75
End: 2025-04-08

## 2025-04-08 VITALS
DIASTOLIC BLOOD PRESSURE: 85 MMHG | HEART RATE: 65 BPM | WEIGHT: 209 LBS | SYSTOLIC BLOOD PRESSURE: 134 MMHG | OXYGEN SATURATION: 95 % | BODY MASS INDEX: 36.12 KG/M2

## 2025-04-08 DIAGNOSIS — R91.8 ABNORMAL FINDING ON LUNG IMAGING: ICD-10-CM

## 2025-04-08 DIAGNOSIS — R91.8 INTERSTITIAL LUNG ABNORMALITY (ILA): Primary | ICD-10-CM

## 2025-04-08 DIAGNOSIS — R06.02 SHORTNESS OF BREATH ON EXERTION: ICD-10-CM

## 2025-04-08 DIAGNOSIS — M35.00 SJOGREN'S SYNDROME, WITH UNSPECIFIED ORGAN INVOLVEMENT: ICD-10-CM

## 2025-04-08 DIAGNOSIS — R05.3 CHRONIC COUGH: ICD-10-CM

## 2025-04-08 PROCEDURE — 3079F DIAST BP 80-89 MM HG: CPT | Performed by: INTERNAL MEDICINE

## 2025-04-08 PROCEDURE — 3075F SYST BP GE 130 - 139MM HG: CPT | Performed by: INTERNAL MEDICINE

## 2025-04-08 PROCEDURE — 99215 OFFICE O/P EST HI 40 MIN: CPT | Mod: 25 | Performed by: INTERNAL MEDICINE

## 2025-04-08 NOTE — NURSING NOTE
Karis Youssef's goals for this visit include:   Chief Complaint   Patient presents with    Follow Up    Cough       She requests these members of her care team be copied on today's visit information: no     PCP: Sabrina Matthews    Referring Provider:  Referred Self, MD  No address on file    /85 (BP Location: Right arm, Patient Position: Chair, Cuff Size: Adult Large)   Pulse 65   Wt 94.8 kg (209 lb)   LMP  (LMP Unknown)   SpO2 95%   BMI 36.12 kg/m      Do you need any medication refills at today's visit? No     MINA Resendez   Neph/Pulm [facility]

## 2025-04-08 NOTE — TELEPHONE ENCOUNTER
Voicemail left for [facility] Radiology requesting that CT chest wo from 03/07/2023 be pushed to  PACS for review.    Brian Dyson LPN  Pulmonary Medicine:  [facility]  Phone: [phone] Fax: [phone]     <<-----Click here for Discharge Medication Review

## 2025-04-10 LAB
DLCOUNC-%PRED-PRE: 94 %
DLCOUNC-PRE: 18.18 ML/MIN/MMHG
DLCOUNC-PRED: 19.25 ML/MIN/MMHG
ERV-%PRED-PRE: 10 %
ERV-PRE: 0.11 L
ERV-PRED: 0.99 L
EXPTIME-PRE: 6.23 SEC
FEF2575-%PRED-PRE: 86 %
FEF2575-PRE: 1.48 L/SEC
FEF2575-PRED: 1.71 L/SEC
FEFMAX-%PRED-PRE: 98 %
FEFMAX-PRE: 5.51 L/SEC
FEFMAX-PRED: 5.57 L/SEC
FEV1-%PRED-PRE: 76 %
FEV1-PRE: 1.56 L
FEV1FEV6-PRE: 81 %
FEV1FEV6-PRED: 79 %
FEV1FVC-PRE: 79 %
FEV1FVC-PRED: 78 %
FEV1SVC-PRE: 75 %
FEV1SVC-PRED: 65 %
FIFMAX-PRE: 2.88 L/SEC
FRCPLETH-%PRED-PRE: 77 %
FRCPLETH-PRE: 2.32 L
FRCPLETH-PRED: 2.99 L
FVC-%PRED-PRE: 75 %
FVC-PRE: 1.99 L
FVC-PRED: 2.63 L
IC-%PRED-PRE: 99 %
IC-PRE: 1.99 L
IC-PRED: 1.99 L
RVPLETH-%PRED-PRE: 102 %
RVPLETH-PRE: 2.22 L
RVPLETH-PRED: 2.16 L
TLCPLETH-%PRED-PRE: 84 %
TLCPLETH-PRE: 4.31 L
TLCPLETH-PRED: 5.11 L
VA-%PRED-PRE: 76 %
VA-PRE: 3.59 L
VC-%PRED-PRE: 66 %
VC-PRE: 2.09 L
VC-PRED: 3.17 L

## 2025-04-11 NOTE — PROGRESS NOTES
Pulmonary Patient Follow Up Clinic Note   04/08/2025      PCP: Sabrina Matthews    Reason for visit: ALESSANDRO follow up    Pulmonary HPI:   Karis Youssef is a 74 year old female w/ h/o former smoking history (15 pack year, quit at age 32yo), Sjorgen's (not on any scheduled therapy, see Dr. Finesse Talamantes), osteoporosis, elevated BMI, OA  who presents for follow up of her progerssive NIX, nocturnal cough, and ALESSANDRO.   Pt has history of Sjorgen's with symptoms of dry eyes and mouth and ZELDA and SSA+ in 2011. No immunosuppression. Use artifical tears and water sips.     Last Pulmonary visit 3/19/2024.  During the previous dyspnea on exertion was related to deconditioning as represented by her February 2024 stress echo test and by history.  She also has been on substantial weight and was referred to weight management (though has not followed up with him).  She also had nocturnal cough attributed likely to postnasal drip and reflux.  Encouraged treatment of these to see if cough was reduced (Flonase, famotidine/lifestyle changes).  She also had biapical fibrotic changes, very mild and likely fit into more of an ALESSANDRO pattern considering her normal PFTs.    Pulm visit 4/8/2025: Since last appointment, patient had an unfortunate accident in which she fractured her wrist (she also fell and broke her arm in 2023). No surgery was required and she was able to remove the cast before a big trip to [place].  When in [place] ([place], [place], [place]), spent a lot of time walking the majority of the day with her group and friends.  She found that she still had to slow down when going uphill and with the day going on she would have also slow down-however she felt more that this was related to knee pain and not necessarily shortness of breath.  She also got a new puppy and so she is walking more (albeit she is at times tired out of the own dog).  Compared to last year, she has lost about 6 pounds.  Did not schedule with weight loss  management.  Coughing has also improved.  Overall, she feels better than she did last year she become more more active.  Likely, her wrist doing okay and she has had no further falls.    Review of systems: a complete 12-point ROS conducted, & found to be negative w/ exceptions as noted in the HPI.    Reviewed PMH, PSH, FH, SH    Medications:  Current Outpatient Medications   Medication Sig Dispense Refill    Calcium Carbonate-Vitamin D (CALCIUM 600 + D OR) Take 1,200 mg by mouth daily       carboxymethylcellulose (CELLUVISC/REFRESH LIQUIGEL) 1 % ophthalmic solution Place 1 drop into both eyes 3 times daily      cetirizine (ZYRTEC) 10 MG tablet Take 10 mg by mouth daily      diclofenac (VOLTAREN) 1 % topical gel Place onto the skin 4 times daily 100 g 6    fluticasone (FLONASE) 50 MCG/ACT nasal spray       MAGNESIUM PO Take 250 mg by mouth daily       Multiple Vitamins-Minerals (ICAPS AREDS 2 PO) Take 1 tablet by mouth 2 times daily      NEW MED Nettle tincture      Polypodium Leucotomos (HELIOCARE) 240 MG CAPS       SUDAFED 30 MG OR TABS Take by mouth.      triamcinolone (KENALOG) 0.1 % external ointment Apply topically 2 times daily. (Use topical or oral steroid, not both) 30 g 1    VITAMIN C 500 MG OR TABS       Vitamin D, Cholecalciferol, 25 MCG (1000 UT) TABS daily       VITAMIN E 400 UNIT PO CAPS       zoledronic Acid (RECLAST) 5 MG/100ML SOLN infusion        No current facility-administered medications for this visit.       Allergies:  Allergies   Allergen Reactions    Erythromycin Nausea     Tongue turned black.    Nsaids GI Disturbance     Gastritis          Physical examination:  /85 (BP Location: Right arm, Patient Position: Chair, Cuff Size: Adult Large)   Pulse 65   Wt 94.8 kg (209 lb)   LMP  (LMP Unknown)   SpO2 95%   BMI 36.12 kg/m      General: NAD  Eyes: Anicteric sclera  Lymphatics: No significant cervical or supraclavicular LNs  CV: RR, no m/c/r  Lungs: CTAB  Abd: Soft, NT,  "protruding  Ext: WWP, no BLE edema.  No clubbing  Skin: No rashes, cyanosis, or jaundice  Neuro: No focal deficits  Psych: Euthymic, normal affect, good eye contact       Labs: reviewed in [facility] & personally interpreted.     Abs eosinophil count 3025-3087: 0.3, 0.2, 0.4 K cells/uL  HCO3- previously 27 in 2010 and 2019; more recently 27 and 24       Imaging: reviewed in [facility] & personally interpreted. Below are the interpretations from the formal Radiology review.    2/23/2024- Echo stress test- normal TTE and heart rate response. Stoppeed due to fatgiue, not dyspnea (though she was having WOB)     OSH CT Chest report 3/2023:  \"FINDINGS:   LUNGS AND PLEURA: No pleural effusion or pneumothorax. Biapical scarring. No acute appearing infiltrates. No significant pulmonary nodule or mass. Scattered calcified granulomas.     MEDIASTINUM/AXILLAE: Normal heart size. No pericardial effusion. Normal caliber thoracic aorta. No thoracic lymphadenopathy. Calcified thoracic lymph nodes.     CORONARY ARTERY CALCIFICATION: Mild.     UPPER ABDOMEN: Calcified splenic granulomas.     MUSCULOSKELETAL: Mildly displaced proximal right humeral fracture involving the greater tuberosity. No acute displaced rib fractures. Chronic T9 compression fracture with progression of vertebral body height loss since 05/18/2021. Postoperative changes distal right radius.\"     Personal review in PACS- no previous CT chest.  Mild biapical fibrosis, scattered calcified granulomas, potential areas of reactive fibrosis related to adjacent vertebral bone spur in RLL, atelectasis present. Potential scattered peripheral reticulation thickening.     PFT:  Most Recent [facility] Pulmonary Function Testing (FVC/FEV1 only)  FVC-Pre   Date Value Ref Range Status   04/08/2025 1.99 L    02/20/2024 2.01 L    01/11/2018 2.60 L      FVC-%Pred-Pre   Date Value Ref Range Status   04/08/2025 75 %    02/20/2024 75 %    01/11/2018 82 %      FEV1-Pre   Date Value Ref Range Status "   04/08/2025 1.56 L    02/20/2024 1.58 L    01/11/2018 2.10 L      FEV1-%Pred-Pre   Date Value Ref Range Status   04/08/2025 76 %    02/20/2024 76 %    01/11/2018 85 %      4/8/2025- Normal PFTs  Similar to 2/2024 2/20/2024- Normal PFTs.  Compared to 1/2018, significant decline in TLC, borderline significant decline in FEV1    Impression & recommendations:    Karis was seen today for follow up and cough.    Diagnoses and all orders for this visit:    Interstitial lung abnormality (ALESSANDRO)  -     General PFT Lab (Please always keep checked); Future  -     Pulmonary Function Test; Future  -     Adult Pulmonology Clinic Follow-Up Order (Blank); Future    Sjogren's syndrome, with unspecified organ involvement    Chronic cough    Shortness of breath on exertion    Patient with history of Sjogren's (no immunosuppressive meds) and family history with multiple family members having autoimmune diseases presents for evaluation for dyspnea on exertion.    Patient presents for 1 year follow-up.    PFTs stable.  Monitoring yearly in the setting of ALESSANDRO.  Considering normal PFTs and stability of PFTs, still needs ALESSANDRO definition.  Will continue to follow yearly for few years to ensure no progression.  May obtain a CT chest 4 to 5 years after last 1 if PFTs remain stable for imaging confirmation of PFT stability.    Patient's dyspnea on exertion previously was highly attributed to deconditioning and BMI.  Patient has been more active in the setting of adopting a new dog who is fairly active.  Her overall fitness has improved and she was able to do long walks while on vacation in [place] with her friends on a tour group.  Her main issues were going uphill still (though was able to keep up with part of her group) as well as knee pain from osteoarthritis which also limited her ability to exert herself.  She likely still has some deconditioning and would also benefit from weight loss and a more regular aerobic exercise plan.    Her  chronic cough also is relatively stable with improvement with the treatment of the postnasal drip and reflux symptoms       RTC in 1 year       40 minutes excluding the time spent on cigarette cessation was  spent on the date of the encounter doing chart review, history and exam, documentation and further activities as noted above.    These conclusions are made at the best of one's knowledge and belief based on the provided evidence such as patient's history and allergy test results and they can change over time or can be incomplete because of missing information's.    I explained the lab values, imagings and findings to the patient.  Patient expressed understanding I did not recognize any barriers to the understanding of the patient.    The above note was dictated using voice recognition software and may include typographical errors. Please contact the author for any clarifications.    Kamari Glaser MD  , Division of Pulmonary/Critical Care

## 2025-04-12 ENCOUNTER — PATIENT OUTREACH (OUTPATIENT)
Dept: CARE COORDINATION | Facility: CLINIC | Age: 75
End: 2025-04-12
Payer: COMMERCIAL

## 2025-06-02 ENCOUNTER — MYC MEDICAL ADVICE (OUTPATIENT)
Dept: RHEUMATOLOGY | Facility: CLINIC | Age: 75
End: 2025-06-02
Payer: COMMERCIAL

## 2025-06-02 ENCOUNTER — PATIENT SELF-TRIAGE (OUTPATIENT)
Dept: URGENT CARE | Facility: CLINIC | Age: 75
End: 2025-06-02
Payer: COMMERCIAL

## 2025-06-02 DIAGNOSIS — Z12.11 SPECIAL SCREENING FOR MALIGNANT NEOPLASMS, COLON: Primary | ICD-10-CM

## 2025-06-02 NOTE — PROGRESS NOTES
"Self-triage colonoscopy order reviewed by [facility] RN and is approved for co-signature.    [facility] Screening Colonoscopy Referral Review    Patient meets the inclusion criteria for screening colonoscopy standing order.    Ordering/Referring Provider:  Sabrina Matthews      BMI: Estimated body mass index is 36.12 kg/m  as calculated from the following:    Height as of 1/30/25: 1.62 m (5' 3.78\").    Weight as of 4/8/25: 94.8 kg (209 lb).     Sedation:  Does patient have any of the following conditions affecting sedation?  No medical conditions affecting sedation.    Previous Scopes:  Any previous recommendations or follow up needs based on previous scope?  na / No recommendations.    Medical Concerns to Postpone Order:  Does patient have any of the following medical concerns that should postpone/delay colonoscopy referral?  No medical conditions affecting colonoscopy referral.    Final Referral Details:  Based on patient's medical history patient is appropriate for referral order with moderate sedation. If patient's BMI > 50 do not schedule in [facility].   "

## 2025-06-16 ENCOUNTER — TELEPHONE (OUTPATIENT)
Dept: GASTROENTEROLOGY | Facility: CLINIC | Age: 75
End: 2025-06-16
Payer: COMMERCIAL

## 2025-06-16 DIAGNOSIS — Z12.11 SPECIAL SCREENING FOR MALIGNANT NEOPLASMS, COLON: Primary | ICD-10-CM

## 2025-06-16 RX ORDER — BISACODYL 5 MG/1
TABLET, DELAYED RELEASE ORAL
Qty: 4 TABLET | Refills: 0 | Status: SHIPPED | OUTPATIENT
Start: 2025-06-16 | End: 2025-06-17

## 2025-06-16 RX ORDER — BISACODYL 5 MG/1
TABLET, DELAYED RELEASE ORAL
Qty: 4 TABLET | Refills: 0 | Status: CANCELLED | OUTPATIENT
Start: 2025-06-16

## 2025-06-16 NOTE — TELEPHONE ENCOUNTER
Bowel Prep Review:  Disclaimer: No call was made to the patient.     Extended Golytely bowel prep. Bowel prep sent to    [facility] [place] - [address] [facility].   Recommended due to poor prep/inadequate bowel prep in the past.   Instructions were sent via [employer] Contin[employer] Larsen LPN  Endoscopy Procedure Pre Assessment

## 2025-06-16 NOTE — TELEPHONE ENCOUNTER
Pre visit planning completed.      Procedure details:    Patient scheduled for Colonoscopy on 7/8/25.     Approximate arrival time: 0645. Procedure time 0730.   *Ensure patient is aware that endoscopy team will be calling about 2 days prior to procedure date to confirm arrival time as this may change.     Facility location: [facility]; [address], [place] [address]. Check in location: 2nd Floor at Surgery desk.  *Disclaimer: Drivers are to check in with patient and stay on campus during procedure.     Sedation type: Conscious sedation     Pre op exam needed? No.    Indication for procedure: screening      Chart review:     Electronic implanted devices? No    Recent diagnosis of diverticulitis within the last 6 weeks? No      Medication review:    Diabetic? No    Anticoagulants? No    Weight loss medication/injectable? No GLP-1 medication per patient's medication list. Nursing to verify with pre-assessment call.    Other medication HOLDING recommendations:  N/A      Prep for procedure:     Bowel prep recommendation: Extended Golytely. Bowel prep sent to      [facility] - [address] [facility].    Due to: poor prep/inadequate bowel prep in the past    Procedure information and instructions sent via [employer]         Corinne Kliber, RN  Endoscopy Procedure Pre Assessment   [phone] option 3

## 2025-06-17 RX ORDER — ONDANSETRON 4 MG/1
4 TABLET, ORALLY DISINTEGRATING ORAL EVERY 8 HOURS PRN
Qty: 3 TABLET | Refills: 0 | Status: SHIPPED | OUTPATIENT
Start: 2025-06-17

## 2025-06-17 RX ORDER — BISACODYL 5 MG/1
TABLET, DELAYED RELEASE ORAL
Qty: 4 TABLET | Refills: 0 | Status: SHIPPED | OUTPATIENT
Start: 2025-06-17

## 2025-06-17 RX ORDER — POLYETHYLENE GLYCOL 3350 17 G/17G
POWDER, FOR SOLUTION ORAL
Qty: 238 G | Refills: 0 | Status: SHIPPED | OUTPATIENT
Start: 2025-06-17

## 2025-06-17 NOTE — TELEPHONE ENCOUNTER
Pre assessment completed for upcoming procedure.   (Please see previous telephone encounter notes for complete details)    I called and spoke with patient       Procedure details:    Procedure date 7/8, approximate arrival time 0645 and facility location reviewed.   Patient is aware that endoscopy team will be calling about 2 days prior to confirm arrival time.    Designated  policy reviewed and that site requests drivers to check in and stay on campus. Instructed to have someone stay 6  hours post procedure.   *Disclaimer - please notify the [facility] RN GI staff with any  issues/concerns.    Medication review:    Medications reviewed. Please see supporting documentation below. Holding recommendations discussed (if applicable).       Prep for procedure:     Procedure prep instructions reviewed.        Any additional information needed:  Patient states that she had a hard time with her last prep and keeping it down. I explained to her that there is a low volume extended prep that would help with the volume of liquids she has to drink. I also informed her that an anti nausea medication can be prescribed for her to take along with the prep as needed. She is requesting the disintegrating Zofran tablets be sent to her pharmacy. New prep instructions have been sent to her [employer] account and updated prescriptions have been sent to her requested pharmacy.      Patient verbalized understanding and had no questions or concerns at this time.      Josee Larsen LPN  Endoscopy Procedure Pre Assessment   [phone] option 3

## 2025-07-02 ENCOUNTER — OFFICE VISIT (OUTPATIENT)
Dept: URGENT CARE | Facility: URGENT CARE | Age: 75
End: 2025-07-02
Payer: COMMERCIAL

## 2025-07-02 VITALS
OXYGEN SATURATION: 96 % | SYSTOLIC BLOOD PRESSURE: 125 MMHG | RESPIRATION RATE: 16 BRPM | TEMPERATURE: 98.5 F | BODY MASS INDEX: 36.61 KG/M2 | HEART RATE: 79 BPM | WEIGHT: 211.8 LBS | DIASTOLIC BLOOD PRESSURE: 77 MMHG

## 2025-07-02 DIAGNOSIS — T63.441A BEE STING REACTION, ACCIDENTAL OR UNINTENTIONAL, INITIAL ENCOUNTER: Primary | ICD-10-CM

## 2025-07-02 DIAGNOSIS — M80.00XD AGE-RELATED OSTEOPOROSIS WITH CURRENT PATHOLOGICAL FRACTURE WITH ROUTINE HEALING: Primary | ICD-10-CM

## 2025-07-02 DIAGNOSIS — Z92.29 HISTORY OF BISPHOSPHONATE THERAPY: ICD-10-CM

## 2025-07-02 PROCEDURE — 3074F SYST BP LT 130 MM HG: CPT

## 2025-07-02 PROCEDURE — 99213 OFFICE O/P EST LOW 20 MIN: CPT

## 2025-07-02 PROCEDURE — 3078F DIAST BP <80 MM HG: CPT

## 2025-07-02 NOTE — PROGRESS NOTES
Urgent Care Clinic Visit  Chief Complaint   Patient presents with    Insect Bites     Bee sting to right thigh earlier today. Redness, raised skin, itching, warm to the touch.                7/2/2025     6:41 PM   Additional Questions   Roomed by Joanna   Accompanied by Self

## 2025-07-03 ENCOUNTER — TELEPHONE (OUTPATIENT)
Dept: GASTROENTEROLOGY | Facility: CLINIC | Age: 75
End: 2025-07-03
Payer: COMMERCIAL

## 2025-07-03 NOTE — PATIENT INSTRUCTIONS
- Benadryl as needed at night for itching  -Monitor wound for signs of infection  -Return if having signs of infection or if you would like your wound reevaluated  -Reasonable to try topical steroid that you have at home

## 2025-07-03 NOTE — TELEPHONE ENCOUNTER
Left voicemail of arrival time of 6:45 AM.     [employer] message sent with updated arrival time.

## 2025-07-03 NOTE — PROGRESS NOTES
Assessment & Plan:      Problem List Items Addressed This Visit    None  Visit Diagnoses         Bee sting reaction, accidental or unintentional, initial encounter    -  Primary          Medical Decision Making    Bee sting  Patient presenting with a bee sting injury after she was mowing the lawn today.  Patient had no signs of anaphylaxis.  Patient has previous insect bites that have developed into cellulitis.  Erythema and lesion today not consistent with infection.  Through shared decision making opted for outpatient observation by patient.  Patient demonstrated understanding of care plan and return precautions as per below.  Patient discharged in stable condition, all questions answered.  -Benadryl as needed at night for itching  -Monitor wound for signs of infection  -Return if having signs of infection or if you would like your wound reevaluated  -Reasonable to try topical steroid that you have at home    Patient verbalizes understanding of the treatment regimen and will follow up as needed for recheck and evaluation if symptoms worsen or do not improve. Patient states understanding and agreement with the plan.    Jordan Tang MD  Internal Medicine-Pediatrics  [facility]     Subjective:      Karis Youssef is a 75 year old female here for evaluation of bee sting.    Patient was mowing her lawn today and got caught in a thick portion of the grass and got off the mower to evaluate.  Felt something on the inside of her legs and then realized that 2 small piece were flying away.  Initially the area of her leg was small round and red.  She jewels a [place] around it as she has done before for an episode of cellulitis after bug bite.  The area has increased in size but is not painful or having an discharge.  She has iced the area and taken oral antihistamines.  She is otherwise in her normal state of health.  She has swelling of her bilateral ankles at night sometimes.     The following portions of the  patient's history were reviewed and updated as appropriate: allergies, current medications, and problem list.     Review of Systems  Pertinent items are noted in HPI.  All other systems are negative.    Allergies  Allergies   Allergen Reactions    Erythromycin Nausea     Tongue turned black.    Nsaids GI Disturbance     Gastritis          Family History   Problem Relation Age of Onset    C.A.D. Mother     Osteoporosis Mother     Hypertension Mother     Heart Disease Mother         CAD, CHF    Coronary Artery Disease Mother     Hyperlipidemia Mother     Diabetes Father     Hypertension Father     Cancer Brother         liver    Diabetes Brother     Diabetes Sister     Breast Cancer Sister 50    Hypertension Sister     Cancer Sister 72        non-hodgkins lymphoma    Cerebrovascular Disease Maternal Grandmother     Diabetes Paternal Grandfather     Diabetes Daughter         type 2    Connective Tissue Disorder Other         2 cousins and niece with MS; aunt with lupus    Genetic Disorder Other         Lupus    Rheumatoid Arthritis Cousin     Genetic Disorder Cousin         MS    Diabetes Niece     Hyperlipidemia Sister     Hypertension Sister     Breast Cancer Sister     Other Cancer Brother         Liver    Diabetes Brother     Other Cancer Sister         non-hodgkins lymphoma    Thyroid Disease Sister     Diabetes Sister     Hypertension Sister     Osteoporosis Sister     Cerebrovascular Disease Sister     Hypertension Sister     Asthma Nephew     Asthma Other     Genetic Disorder Cousin         MS    Genetic Disorder Niece         MS/Lupus       Social History     Tobacco Use    Smoking status: Former     Current packs/day: 0.00     Types: Cigarettes     Quit date: 1984     Years since quittin.5     Passive exposure: Past    Smokeless tobacco: Never    Tobacco comments:     nonsmoking hosuehold   Substance Use Topics    Alcohol use: Yes     Comment: 1 glass of wine one/two times a week        Objective:       /77 (BP Location: Left arm, Cuff Size: Adult Large)   Pulse 79   Temp 98.5  F (36.9  C) (Tympanic)   Resp 16   Wt 96.1 kg (211 lb 12.8 oz)   LMP  (LMP Unknown)   SpO2 96%   BMI 36.61 kg/m    General appearance - alert, well appearing, and in no distress  Mental status - normal mood, behavior, speech, dress, motor activity, and thought processes  Chest - breathing comfortably on room air no audible wheezing or respiratory distress  Heart - appears well-perfused  Extremities - lymphedema in the thighs and trace ankle and pedal edema  Skin - 1 circular 10 cm erythematous raised plaque on the right medial thigh without tenderness to palpation or purulence     Lab & Imaging Results    No results found for this or any previous visit (from the past 24 hours).

## 2025-07-08 ENCOUNTER — HOSPITAL ENCOUNTER (OUTPATIENT)
Facility: AMBULATORY SURGERY CENTER | Age: 75
Discharge: HOME | End: 2025-07-08
Attending: STUDENT IN AN ORGANIZED HEALTH CARE EDUCATION/TRAINING PROGRAM | Admitting: STUDENT IN AN ORGANIZED HEALTH CARE EDUCATION/TRAINING PROGRAM
Payer: COMMERCIAL

## 2025-07-08 VITALS
DIASTOLIC BLOOD PRESSURE: 73 MMHG | TEMPERATURE: 97.7 F | HEART RATE: 71 BPM | RESPIRATION RATE: 16 BRPM | OXYGEN SATURATION: 95 % | SYSTOLIC BLOOD PRESSURE: 118 MMHG

## 2025-07-08 LAB — COLONOSCOPY: NORMAL

## 2025-07-08 PROCEDURE — 45385 COLONOSCOPY W/LESION REMOVAL: CPT | Mod: PT

## 2025-07-08 PROCEDURE — G8907 PT DOC NO EVENTS ON DISCHARG: HCPCS

## 2025-07-08 PROCEDURE — G8918 PT W/O PREOP ORDER IV AB PRO: HCPCS

## 2025-07-08 PROCEDURE — 88305 TISSUE EXAM BY PATHOLOGIST: CPT | Performed by: PATHOLOGY

## 2025-07-08 RX ORDER — FENTANYL CITRATE 50 UG/ML
INJECTION, SOLUTION INTRAMUSCULAR; INTRAVENOUS PRN
Status: DISCONTINUED | OUTPATIENT
Start: 2025-07-08 | End: 2025-07-08 | Stop reason: HOSPADM

## 2025-07-08 NOTE — H&P
Endoscopy H&P    Karis Youssef  [id number]  female  75 year old      Reason for procedure/surgery: Surveillance for history of polyps. Last scope in 2023 with removal of multiple sessile serrated lesions.    Patient Active Problem List   Diagnosis    Thigh pain    Osteopenia    Sjogren's syndrome    CARDIOVASCULAR SCREENING; LDL GOAL LESS THAN 160    Palpitations    Acute conjunctivitis    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    Choroidal nevus, left    Acute pain of left shoulder    Displaced fracture of neck of right radius    Compression fracture of T9 vertebra, initial encounter (H)    Age-related osteoporosis with current pathological fracture with routine healing    Shortness of breath on exertion    Abnormal finding on lung imaging    History of bisphosphonate therapy    Chronic pain of both knees    Rash       Past Surgical History:    Past Surgical History:   Procedure Laterality Date    BIOPSY  2000    right breast lumpectomy    BREAST LUMPECTOMY, RT/LT      right breast, benign    CATARACT IOL, RT/LT      bilateral    COLONOSCOPY N/A 5/12/2023    Procedure: COLONOSCOPY, WITH POLYPECTOMY AND BIOPSY;  Surgeon: Geneva Newton DO;  Location: [facility] OR    COLONOSCOPY N/A 5/12/2023    Procedure: COLONOSCOPY, FLEXIBLE, WITH LESION REMOVAL USING SNARE;  Surgeon: Geneva Newton DO;  Location: [facility] OR    COLONOSCOPY WITH CO2 INSUFFLATION N/A 5/12/2023    Procedure: Colonoscopy with CO2 insufflation;  Surgeon: Geneva Newton DO;  Location: [facility] OR    ORTHOPEDIC SURGERY  01/2018    right radius        Past Medical History:   Past Medical History:   Diagnosis Date    Abnormal Pap smear 1995    had colpo 1995-hyperplasia, nl since    Arthritis     COPD (chronic obstructive pulmonary disease) (H)     Disturbance of skin sensation     Osteopenia     Osteopenia     Seasonal allergies     Sjogren's syndrome        Social History:   Social History     Tobacco Use    Smoking status: Former     Current packs/day: 0.00      Types: Cigarettes     Quit date: 1984     Years since quittin.5     Passive exposure: Past    Smokeless tobacco: Never    Tobacco comments:     nonsmoking hosuenichol   Substance Use Topics    Alcohol use: Yes     Comment: 1 glass of wine one/two times a week       Family History:   Family History   Problem Relation Age of Onset    C.A.D. Mother     Osteoporosis Mother     Hypertension Mother     Heart Disease Mother         CAD, CHF    Coronary Artery Disease Mother     Hyperlipidemia Mother     Diabetes Father     Hypertension Father     Cancer Brother         liver    Diabetes Brother     Diabetes Sister     Breast Cancer Sister 50    Hypertension Sister     Cancer Sister 72        non-hodgkins lymphoma    Cerebrovascular Disease Maternal Grandmother     Diabetes Paternal Grandfather     Diabetes Daughter         type 2    Connective Tissue Disorder Other         2 cousins and niece with MS; aunt with lupus    Genetic Disorder Other         Lupus    Rheumatoid Arthritis Cousin     Genetic Disorder Cousin         MS    Diabetes Niece     Hyperlipidemia Sister     Hypertension Sister     Breast Cancer Sister     Other Cancer Brother         Liver    Diabetes Brother     Other Cancer Sister         non-hodgkins lymphoma    Thyroid Disease Sister     Diabetes Sister     Hypertension Sister     Osteoporosis Sister     Cerebrovascular Disease Sister     Hypertension Sister     Asthma Nephew     Asthma Other     Genetic Disorder Cousin         MS    Genetic Disorder Niece         MS/Lupus       Allergies:   Allergies   Allergen Reactions    Erythromycin Nausea     Tongue turned black.    Nsaids GI Disturbance     Gastritis          Active Medications:   Current Outpatient Medications   Medication Sig Dispense Refill    Calcium Carbonate-Vitamin D (CALCIUM 600 + D OR) Take 1,200 mg by mouth daily       cetirizine (ZYRTEC) 10 MG tablet Take 10 mg by mouth daily      diclofenac (VOLTAREN) 1 % topical gel Place onto  the skin 4 times daily 100 g 6    fluticasone (FLONASE) 50 MCG/ACT nasal spray       MAGNESIUM PO Take 250 mg by mouth daily       Multiple Vitamins-Minerals (ICAPS AREDS 2 PO) Take 1 tablet by mouth 2 times daily      ondansetron (ZOFRAN ODT) 4 MG ODT tab Take 1 tablet (4 mg) by mouth every 8 hours as needed for nausea. 3 tablet 0    SUDAFED 30 MG OR TABS Take by mouth.      triamcinolone (KENALOG) 0.1 % external ointment Apply topically 2 times daily. (Use topical or oral steroid, not both) 30 g 1    VITAMIN C 500 MG OR TABS       Vitamin D, Cholecalciferol, 25 MCG (1000 UT) TABS daily       VITAMIN E 400 UNIT PO CAPS       bisacodyl (DULCOLAX) 5 MG EC tablet One day prior to procedure at 3PM take two (2) tablets. If your procedure is BEFORE 11AM, take two (2) tablets at 11PM. If your procedure is AFTER 11AM, day of procedure take (2) tablets at 6AM. 4 tablet 0    carboxymethylcellulose (CELLUVISC/REFRESH LIQUIGEL) 1 % ophthalmic solution Place 1 drop into both eyes 3 times daily      NEW MED Nettle tincture      polyethylene glycol (GOLYTELY) 236 g suspension One day prior to procedure at 3 pm fill container with water. Cover and shake until mixed well. Drink an 8 oz. glass of mixture every 10-15 minutes until the 1st half of the jug is gone. Place the remainder of the Golytely in the refrigerator. At 8 pm, drink the 2nd half of the jug of Golytely bowel prep. Drink an 8 oz. glass of Golytely every 10-15 minutes until the container of Golytely is gone. 4000 mL 0    polyethylene glycol (MIRALAX) 17 GM/Dose powder Take 1 capful (17g) of Miralax mixed with 8 oz of a clear liquid twice daily for 7 days prior to your scheduled procedure. 238 g 0    Polypodium Leucotomos (HELIOCARE) 240 MG CAPS       zoledronic Acid (RECLAST) 5 MG/100ML SOLN infusion          Systemic Review:   CONSTITUTIONAL: NEGATIVE for fever, chills, change in weight  ENT/MOUTH: NEGATIVE for ear, mouth and throat problems  RESP: NEGATIVE for  significant cough or SOB  CV: NEGATIVE for chest pain, palpitations or peripheral edema    Physical Examination:   Vital Signs: BP (!) 148/83 (Cuff Size: Adult Large)   Pulse 80   Temp 97.1  F (36.2  C) (Temporal)   Resp 16   LMP  (LMP Unknown)   SpO2 96%   GENERAL: healthy, alert and no distress  NECK: no adenopathy, no asymmetry, masses, or scars  RESP: lungs clear to auscultation - no rales, rhonchi or wheezes  CV: regular rate and rhythm, normal S1 S2, no S3 or S4, no murmur, click or rub, no peripheral edema and peripheral pulses strong  ABDOMEN: soft, nontender, no hepatosplenomegaly, no masses and bowel sounds normal  MS: no gross musculoskeletal defects noted, no edema    Plan: Appropriate to proceed as scheduled.      Myrna Mejia MD  7/8/2025    PCP:  Sabrina Matthews

## 2025-07-31 ENCOUNTER — OFFICE VISIT (OUTPATIENT)
Dept: RHEUMATOLOGY | Facility: CLINIC | Age: 75
End: 2025-07-31
Payer: COMMERCIAL

## 2025-07-31 VITALS
DIASTOLIC BLOOD PRESSURE: 77 MMHG | HEART RATE: 83 BPM | SYSTOLIC BLOOD PRESSURE: 123 MMHG | BODY MASS INDEX: 36.57 KG/M2 | WEIGHT: 211.6 LBS | RESPIRATION RATE: 16 BRPM | OXYGEN SATURATION: 96 %

## 2025-07-31 DIAGNOSIS — M89.9 DISORDER OF BONE, UNSPECIFIED: ICD-10-CM

## 2025-07-31 DIAGNOSIS — Z92.29 HISTORY OF BISPHOSPHONATE THERAPY: ICD-10-CM

## 2025-07-31 DIAGNOSIS — M35.01 SJOGREN'S SYNDROME WITH KERATOCONJUNCTIVITIS SICCA: Primary | ICD-10-CM

## 2025-07-31 DIAGNOSIS — M80.00XD AGE-RELATED OSTEOPOROSIS WITH CURRENT PATHOLOGICAL FRACTURE WITH ROUTINE HEALING: ICD-10-CM

## 2025-07-31 LAB
ALBUMIN SERPL BCG-MCNC: 4.2 G/DL (ref 3.5–5.2)
ALP SERPL-CCNC: 75 U/L (ref 40–150)
ALT SERPL W P-5'-P-CCNC: 18 U/L (ref 0–50)
ANION GAP SERPL CALCULATED.3IONS-SCNC: 8 MMOL/L (ref 7–15)
AST SERPL W P-5'-P-CCNC: 26 U/L (ref 0–45)
BASOPHILS # BLD AUTO: 0 10E3/UL (ref 0–0.2)
BASOPHILS NFR BLD AUTO: 1 %
BILIRUB SERPL-MCNC: 0.2 MG/DL
BUN SERPL-MCNC: 17.7 MG/DL (ref 8–23)
CALCIUM SERPL-MCNC: 9.8 MG/DL (ref 8.8–10.4)
CALCIUM SERPL-MCNC: 9.8 MG/DL (ref 8.8–10.4)
CHLORIDE SERPL-SCNC: 103 MMOL/L (ref 98–107)
CREAT SERPL-MCNC: 0.66 MG/DL (ref 0.51–0.95)
EGFRCR SERPLBLD CKD-EPI 2021: >90 ML/MIN/1.73M2
EOSINOPHIL # BLD AUTO: 0.2 10E3/UL (ref 0–0.7)
EOSINOPHIL NFR BLD AUTO: 3 %
ERYTHROCYTE [DISTWIDTH] IN BLOOD BY AUTOMATED COUNT: 12.8 % (ref 10–15)
GLUCOSE SERPL-MCNC: 95 MG/DL (ref 70–99)
HCO3 SERPL-SCNC: 26 MMOL/L (ref 22–29)
HCT VFR BLD AUTO: 41.4 % (ref 35–47)
HGB BLD-MCNC: 12.9 G/DL (ref 11.7–15.7)
IMM GRANULOCYTES # BLD: 0 10E3/UL
IMM GRANULOCYTES NFR BLD: 0 %
LYMPHOCYTES # BLD AUTO: 2.7 10E3/UL (ref 0.8–5.3)
LYMPHOCYTES NFR BLD AUTO: 42 %
MCH RBC QN AUTO: 28.7 PG (ref 26.5–33)
MCHC RBC AUTO-ENTMCNC: 31.2 G/DL (ref 31.5–36.5)
MCV RBC AUTO: 92 FL (ref 78–100)
MONOCYTES # BLD AUTO: 0.7 10E3/UL (ref 0–1.3)
MONOCYTES NFR BLD AUTO: 10 %
NEUTROPHILS # BLD AUTO: 2.8 10E3/UL (ref 1.6–8.3)
NEUTROPHILS NFR BLD AUTO: 44 %
PLATELET # BLD AUTO: 303 10E3/UL (ref 150–450)
POTASSIUM SERPL-SCNC: 4.4 MMOL/L (ref 3.4–5.3)
PROT SERPL-MCNC: 7 G/DL (ref 6.4–8.3)
RBC # BLD AUTO: 4.5 10E6/UL (ref 3.8–5.2)
SODIUM SERPL-SCNC: 137 MMOL/L (ref 135–145)
VIT D+METAB SERPL-MCNC: 44 NG/ML (ref 20–50)
WBC # BLD AUTO: 6.4 10E3/UL (ref 4–11)

## 2025-07-31 RX ORDER — METHYLPREDNISOLONE SODIUM SUCCINATE 40 MG/ML
40 INJECTION INTRAMUSCULAR; INTRAVENOUS
Start: 2025-09-05

## 2025-07-31 RX ORDER — EPINEPHRINE 1 MG/ML
0.3 INJECTION, SOLUTION, CONCENTRATE INTRAVENOUS EVERY 5 MIN PRN
OUTPATIENT
Start: 2025-09-05

## 2025-07-31 RX ORDER — ALBUTEROL SULFATE 90 UG/1
1-2 INHALANT RESPIRATORY (INHALATION)
Start: 2025-09-05

## 2025-07-31 RX ORDER — HEPARIN SODIUM (PORCINE) LOCK FLUSH IV SOLN 100 UNIT/ML 100 UNIT/ML
5 SOLUTION INTRAVENOUS
OUTPATIENT
Start: 2025-09-05

## 2025-07-31 RX ORDER — HEPARIN SODIUM,PORCINE 10 UNIT/ML
5-20 VIAL (ML) INTRAVENOUS DAILY PRN
OUTPATIENT
Start: 2025-09-05

## 2025-07-31 RX ORDER — DIPHENHYDRAMINE HYDROCHLORIDE 50 MG/ML
25 INJECTION, SOLUTION INTRAMUSCULAR; INTRAVENOUS
Start: 2025-09-05

## 2025-07-31 RX ORDER — ZOLEDRONIC ACID 0.05 MG/ML
5 INJECTION, SOLUTION INTRAVENOUS ONCE
Start: 2025-09-05

## 2025-07-31 RX ORDER — ALBUTEROL SULFATE 0.83 MG/ML
2.5 SOLUTION RESPIRATORY (INHALATION)
OUTPATIENT
Start: 2025-09-05

## 2025-07-31 RX ORDER — DIPHENHYDRAMINE HYDROCHLORIDE 50 MG/ML
50 INJECTION, SOLUTION INTRAMUSCULAR; INTRAVENOUS
Start: 2025-09-05

## 2025-07-31 RX ORDER — MEPERIDINE HYDROCHLORIDE 25 MG/ML
25 INJECTION INTRAMUSCULAR; INTRAVENOUS; SUBCUTANEOUS
OUTPATIENT
Start: 2025-09-05

## 2025-07-31 NOTE — PATIENT INSTRUCTIONS
RHEUMATOLOGY    [facility]  [address] [place] [address]  [place] [address]    Phone number: [phone]  Fax number: [phone]    If you need a medication refill, please contact us as you may need lab work and/or a follow up visit prior to your refill.      Thank you for choosing [facility]!    Anne Costello CMA Rheumatology

## 2025-07-31 NOTE — PROGRESS NOTES
Rheumatology Clinic Visit      Karis Youssef MRN# [id number]   YOB: 1950 Age: 75 year old      Date of visit: 7/31/25   PCP: Sabrina Matthews    Chief Complaint   Patient presents with:  Sjogren's Syndrome    Assessment and Plan     1. Sjogren's Syndrome (ZELDA+, SSA+, sicca symptoms): Diagnosed in 2009 by Dr. Delatorre. Currently doing well with frequent sips of water and artificial tears.    - Labs now: CBC, CMP    2.  History of bilateral knee pain:  Osteoarthritis and meniscal tears based on patient history and following with orthopedic surgeon at [facility].  Symptoms are degenerative in nature.  She does not limit her activities because of knee pain.      3.  Osteoporosis: Vertebral compression fracture seen on 5/18/2021 MRI of the lumbar spine performed at [facility].  Previously had a diagnosis of osteopenia based on 2017 DEXA.   She has a history of being on Fosamax in the past but had significant heartburn; she also reports having easy heartburn with any NSAID.  Therefore, avoided oral bisphosphonates and started zoledronic acid with the first dose on 7/1/2021; subsequently received on 8/22/2022, 8/25/2023, 9/5/2024.  Continue Reclast until 2026.  6/14/2023 DEXA stable; plan to repeat DEXA in 2026.  - Continue zoledronic acid 5 mg IV once yearly, next due on or shortly after 8/25/2024  - Continue vitamin D 1000 IU daily  - Continue calcium 1200 mg daily  - Labs now: Calcium, vitamin D    4. R>L dorsal midfoot pain: No swelling.  Worse after increased activity.  Consistent with degenerative arthritis of the midfoot.  Improved with wearing supportive shoes whenever ambulatory, indoors and outdoors.  Has seen podiatry and will follow-up with podiatry as needed.     Total minutes spent in evaluation with patient, documentation, , and review of pertinent studies and chart notes: 22  The longitudinal plan of care for the rheumatology problem(s) were addressed during this  visit.  Due to added complexity of care, we will continue to support the patient and the subsequent management of this condition with ongoing continuity of care.      Ms. Youssef verbalized agreement with and understanding of the rational for the diagnosis and treatment plan.  All questions were answered to best of my ability and the patient's satisfaction. Ms. Youssef was advised to contact the clinic with any questions that may arise after the clinic visit.      Thank you for involving me in the care of the patient    Return to clinic: 1 year    HPI   Karis Youssef is a 75 year old female with a past medical history significant for Sjogren's syndrome and osteoporosis who is seen for rheumatology follow-up    4/23/2013 rheumatology clinic note by Dr. Kwabena Delatorre documents that the patient was following up for a positive SSA and sicca symptoms..  Positive ZELDA.  Positive SSB.  Does not have evidence of other connective tissue disease.  No arthritis, vasculitis, and her review of systems for respiratory, cardiovascular, and CNS is normal.  No rash or parotid swelling.  No new neurologic symptoms.  No lymphadenopathy.  He advised lip biopsy to confirm.    10/4/2018 clinic note by Dr. Carpenter documents Sjogren's with possibly increasing symptoms so was referred to rheumatology.    2/19/2019: Ms. Youssef reports that she has had joint issues most of her life. TMJ tx'd by not chewing gum.  Bilateral knee pain: started 20 yrs ago with torn meniscus, and now bone on bone and was told she may need TKA. Left knee sometimes gives out so she uses hiking poles; was able to hike all over [place] going up and down stairs with worsening knee pain that resolved with rest and acupuncture.  Plans to retire in May 2019.  Fell last year with distal radius and distal ulna fracture s/p ORIF.   Some stiffness in the dorsal midfeet for 10 minutes; and sometimes in the left shoulder that resolves within 10 minutes. Hx of left frozen  shoulder that resolved with physical therapy. There was a concern for COPD at one point but she saw a pulmonologist who told her that she does not have COPD.  Thicker respiratory secretions in the morning that takes a little bit of time to get them going; she was told that she may have postnasal drip by her allergist and is taking Claritin for this.  Dry mouth is treated with frequent sips of water; she reports seeing a dentist twice yearly and has not had recent cavities.  Dry eyes are treated with artificial tears 3 times daily; she does not notice a difference between the ones that are preservative-free versus the ones that have preservative.  She tells me that her ophthalmologist recommended she take Claritin for allergies.    6/3/2021: She reports that she is coming in today because of osteoporosis.  Regarding Sjogren's, she says that everything is stable.  She continues to use frequent sips of water and reports having fairly good oral dentition but does note having a history of TMJ in the past and does grind her teeth.  She follows with the dentist regularly.  She also continues to use artificial tears 1-2 times per day with good control of her dry eyes most recently she was installing a fence and bending over to put screws in at the bottom of the fence; she felt some pain in her back that was evaluated and found to be a vertebral compression fracture.  She has followed with a surgeon who told her that the compression fracture have so far appeared stable and therefore no intervention from surgical or interventional radiology perspective is needed at this time.  She is currently on calcitonin that has helped some with the pain.  She finds it Tylenol 1000 mg twice daily is also effective for her back pain.  She is here to discuss osteoporosis treatment.  She notes in the past she has had significant heartburn with Fosamax and any NSAID.    6/2/2022: Currently doing well.  Did not feel well after last Reclast  infusion but hoping the next infusion is better tolerated.  Dry eye well controlled with eyedrops as needed.  Dry mouth controlled with frequent sips of water.  Following with a dentist regularly and has not had recent dental caries.  No night sweats.  No unintentional weight loss.    7/10/2023: Doing well.  Fell and broke her right humerus and left patella; both treated nonoperatively and completely healed at this point per patient.  Due for Reclast infusion in August 2023.  Taking calcium and vitamin D supplementations.  Dry eye and dry mouth controlled, stable.  Occasional dorsal midfoot pain, worse on the right, worse after increased activity and improves with rest.  No chest pain or pressure or shortness of breath.  No nausea, vomiting, constipation, or diarrhea.  No fevers or chills.  No pain or burning with urination.  No increased urinary frequency.  No UTI symptoms.    7/8/2024: Currently doing well.  Reclast infusions without side effects.  Taking calcium and vitamin D supplementation.  Dry eye and dry mouth controlled/stable; frequent sips of water and artificial tears as needed are effective.  Has had 5 ticks on her this summer and would like Lyme testing.  Has seen podiatry for midfoot pain, improved with shoe wear.  No UTI symptoms.    Today, 7/31/2025: Sjogren's symptoms controlled with frequent sips of water and artificial tears as needed.  Anticipating Reclast infusion later this summer.  Cramping in her legs at she suspects is due to not drinking enough water.  She says she has outdoors gardening more frequently.  Wearing shoes whenever ambulatory that has helped and bothered her feet some primarily because of putting more pressure on the outside of her feet now that has caused callus formation at those locations.    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 [employer]    ROS   12 point review of system was  completed and negative except as noted in the HPI     Active Problem List     Patient Active Problem List   Diagnosis    Thigh pain    Osteopenia    Sjogren's syndrome    CARDIOVASCULAR SCREENING; LDL GOAL LESS THAN 160    Palpitations    Acute conjunctivitis    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    Choroidal nevus, left    Acute pain of left shoulder    Displaced fracture of neck of right radius    Compression fracture of T9 vertebra, initial encounter (H)    Age-related osteoporosis with current pathological fracture with routine healing    Shortness of breath on exertion    Abnormal finding on lung imaging    History of bisphosphonate therapy    Chronic pain of both knees    Rash     Past Medical History     Past Medical History:   Diagnosis Date    Abnormal Pap smear 1995    had colpo 1995-hyperplasia, nl since    Arthritis     COPD (chronic obstructive pulmonary disease) (H)     Disturbance of skin sensation     Osteopenia     Osteopenia     Seasonal allergies     Sjogren's syndrome      Past Surgical History     Past Surgical History:   Procedure Laterality Date    BIOPSY  2000    right breast lumpectomy    BREAST LUMPECTOMY, RT/LT      right breast, benign    CATARACT IOL, RT/LT      bilateral    COLONOSCOPY N/A 5/12/2023    Procedure: COLONOSCOPY, WITH POLYPECTOMY AND BIOPSY;  Surgeon: Geneva Newton DO;  Location: [facility] OR    COLONOSCOPY N/A 5/12/2023    Procedure: COLONOSCOPY, FLEXIBLE, WITH LESION REMOVAL USING SNARE;  Surgeon: Geneva Newton DO;  Location: [facility] OR    COLONOSCOPY N/A 7/8/2025    Procedure: COLONOSCOPY, FLEXIBLE, WITH LESION REMOVAL USING SNARE;  Surgeon: Myrna Mejia MD;  Location: [facility] OR    COLONOSCOPY N/A 7/8/2025    Procedure: COLONOSCOPY, WITH POLYPECTOMY AND BIOPSY;  Surgeon: Myrna Mejia MD;  Location: [facility] OR    COLONOSCOPY WITH CO2 INSUFFLATION N/A 5/12/2023    Procedure: Colonoscopy with CO2 insufflation;  Surgeon: Geneva Newton DO;  Location: [facility] OR     COLONOSCOPY, SCREENING, HIGH RISK N/A 7/8/2025    Procedure: Colonoscopy, Screening, High Risk;  Surgeon: Myrna Mejia MD;  Location: [facility] OR    ORTHOPEDIC SURGERY  01/2018    right radius      Allergy     Allergies   Allergen Reactions    Erythromycin Nausea     Tongue turned black.    Nsaids GI Disturbance     Gastritis        Current Medication List     Current Outpatient Medications   Medication Sig Dispense Refill    Calcium Carbonate-Vitamin D (CALCIUM 600 + D OR) Take 1,200 mg by mouth daily       carboxymethylcellulose (CELLUVISC/REFRESH LIQUIGEL) 1 % ophthalmic solution Place 1 drop into both eyes 3 times daily      cetirizine (ZYRTEC) 10 MG tablet Take 10 mg by mouth daily      diclofenac (VOLTAREN) 1 % topical gel Place onto the skin 4 times daily 100 g 6    fluticasone (FLONASE) 50 MCG/ACT nasal spray       MAGNESIUM PO Take 250 mg by mouth daily       Multiple Vitamins-Minerals (ICAPS AREDS 2 PO) Take 1 tablet by mouth 2 times daily      NEW MED Nettle tincture      Polypodium Leucotomos (HELIOCARE) 240 MG CAPS       SUDAFED 30 MG OR TABS Take by mouth.      triamcinolone (KENALOG) 0.1 % external ointment Apply topically 2 times daily. (Use topical or oral steroid, not both) 30 g 1    VITAMIN C 500 MG OR TABS       Vitamin D, Cholecalciferol, 25 MCG (1000 UT) TABS daily       VITAMIN E 400 UNIT PO CAPS       zoledronic Acid (RECLAST) 5 MG/100ML SOLN infusion        No current facility-administered medications for this visit.     Social History   See HPI    Family History     Family History   Problem Relation Age of Onset    C.A.D. Mother     Osteoporosis Mother     Hypertension Mother     Heart Disease Mother         CAD, CHF    Coronary Artery Disease Mother     Hyperlipidemia Mother     Diabetes Father     Hypertension Father     Cancer Brother         liver    Diabetes Brother     Diabetes Sister     Breast Cancer Sister 50    Hypertension Sister     Cancer Sister 72        non-hodgkins  "lymphoma    Cerebrovascular Disease Maternal Grandmother     Diabetes Paternal Grandfather     Diabetes Daughter         type 2    Connective Tissue Disorder Other         2 cousins and niece with MS; aunt with lupus    Genetic Disorder Other         Lupus    Rheumatoid Arthritis Cousin     Genetic Disorder Cousin         MS    Diabetes Niece     Hyperlipidemia Sister     Hypertension Sister     Breast Cancer Sister     Other Cancer Brother         Liver    Diabetes Brother     Other Cancer Sister         non-hodgkins lymphoma    Thyroid Disease Sister     Diabetes Sister     Hypertension Sister     Osteoporosis Sister     Cerebrovascular Disease Sister     Hypertension Sister     Asthma Nephew     Asthma Other     Genetic Disorder Cousin         MS    Genetic Disorder Niece         MS/Lupus       Physical Exam     Temp Readings from Last 3 Encounters:   07/08/25 97.7  F (36.5  C) (Temporal)   07/02/25 98.5  F (36.9  C) (Tympanic)   01/30/25 97.5  F (36.4  C) (Temporal)     BP Readings from Last 5 Encounters:   07/31/25 123/77   07/08/25 118/73   07/02/25 125/77   04/08/25 134/85   01/30/25 114/72     Pulse Readings from Last 1 Encounters:   07/31/25 83     Resp Readings from Last 1 Encounters:   07/31/25 16     Estimated body mass index is 36.57 kg/m  as calculated from the following:    Height as of 1/30/25: 1.62 m (5' 3.78\").    Weight as of this encounter: 96 kg (211 lb 9.6 oz).    GEN: NAD. Healthy appearing adult.   HEENT: Dry mucous membranes.  Anicteric, noninjected sclera. No obvious external lesions of the ear and nose. Hearing intact.  CV: S1, S2. RRR. No m/r/g  PULM: No increased work of breathing. CTA bilaterally   MSK: MCPs, PIPs, DIPs without swelling or tenderness to palpation.  Wrists without swelling or tenderness to palpation.  Feet without swelling or tenderness to palpation.   SKIN: No rash or jaundice seen  PSYCH: Alert. Appropriate.      Labs / Imaging (select studies)     ZELDA by EIA positive " in 2009    CBC  Recent Labs   Lab Test 01/30/25  0849 07/08/24  1037 07/10/23  1059   WBC 6.7 6.7 7.1   RBC 4.52 4.38 4.47   HGB 13.1 12.8 13.2   HCT 41.3 40.0 39.8   MCV 91 91 89   RDW 12.9 13.1 13.4    310 358   MCH 29.0 29.2 29.5   MCHC 31.7 32.0 33.2   NEUTROPHIL 43 51 50   LYMPH 43 36 37   MONOCYTE 9 10 9   EOSINOPHIL 4 3 3   BASOPHIL 1 0 1   ANEU 2.9 3.4 3.6   ALYM 2.9 2.4 2.6   MATHEUS 0.6 0.7 0.7   AEOS 0.3 0.2 0.2   ABAS 0.1 0.0 0.1     CMP  Recent Labs   Lab Test 01/30/25  0849 09/05/24  0827 07/08/24  1037 01/23/24  0804 07/10/23  1059 01/17/23  0759 08/22/22  1015 06/02/22  1326 06/02/22  1326 12/22/21  0917 06/03/21  1328 10/08/19  1003 02/19/19  0951   NA  --   --  139  --  140  --   --   --   --   --   --   --  141   POTASSIUM  --   --  4.4  --  4.4  --   --   --   --   --   --   --  4.4   CHLORIDE  --   --  104  --  105  --   --   --   --   --   --   --  107   CO2  --   --  27  --  24  --   --   --   --   --   --   --  27   ANIONGAP  --   --  8  --  11  --   --   --   --   --   --   --  7   *  --  113* 96 97 107*  --   --   --  103*  --    < > 99   BUN  --   --  18.6  --  18.2  --   --   --   --   --   --   --  17   CR  --  0.70 0.61  --  0.57  --  0.65   < > 0.65  --  0.75  --  0.64   GFRESTIMATED  --  >90 >90  --  >90  --  >90   < > >90  --  81  --  >90   GFRESTBLACK  --   --   --   --   --   --   --   --   --   --  >90  --  >90   VINNY  --  9.6 9.8  --  9.8  --  9.4   < > 9.9  --  9.4  --  9.6   BILITOTAL  --   --  0.3  --  0.3  --   --   --   --   --   --   --  0.3   ALBUMIN  --   --  4.4  --  4.4  --   --   --  3.9  --   --   --  3.6   PROTTOTAL  --   --  7.3  --  7.3  --   --   --   --   --   --   --  7.1   ALKPHOS  --   --  75  --  81  --   --   --   --   --   --   --  103   AST  --   --  22  --  23  --   --   --   --   --   --   --  21   ALT  --   --  17  --  16  --   --   --   --   --   --   --  28    < > = values in this interval not displayed.       Calcium/VitaminD  Recent Labs    Lab Test 09/05/24  0827 07/08/24  1037 07/10/23  1059 08/22/22  1015 06/02/22  1326   VINNY 9.6 9.8 9.8   < > 9.9   VITDT  --  43 51  --  44    < > = values in this interval not displayed.     TSH/T4  Recent Labs   Lab Test 01/30/25  0849 01/23/24  0804 01/17/23  0759   TSH 1.98 4.05 4.68*   T4  --   --  1.05     Lipid Panel  Recent Labs   Lab Test 01/30/25  0849 01/23/24  0804 01/17/23  0759   CHOL 231* 217* 210*   TRIG 61 93 90   HDL 74 63 68   * 135* 124*   NHDL 157* 154* 142*     Lyme ab screening  Recent Labs   Lab Test 07/08/24  1037   LYMEGM 0.56     UA  Recent Labs   Lab Test 07/08/24  1037 07/10/23  1059 02/19/19  1003   COLOR Yellow Yellow Yellow   APPEARANCE Clear Clear Clear   URINEGLC Negative Negative Negative   URINEBILI Negative Negative Negative   SG 1.020 1.020 1.020   URINEPH 8.0* 6.0 6.0   PROTEIN Negative Negative Negative   UROBILINOGEN 0.2 0.2 0.2   NITRITE Negative Negative Negative   UBLD Trace* Trace* Negative   LEUKEST Negative Negative Negative   WBCU 0-5 0-5  --    RBCU 0-2 0-2  --      Urine Microscopic  Recent Labs   Lab Test 07/08/24  1037 07/10/23  1059   WBCU 0-5 0-5   RBCU 0-2 0-2         Immunization History     Immunization History   Administered Date(s) Administered    COVID-19 12+ (Pfizer) 10/05/2023, 09/26/2024    COVID-19 Bivalent 12+ (Pfizer) 09/22/2022    COVID-19 Monovalent 18+ (Moderna) 11/12/2021    COVID-19 Vaccine ([place]) 03/10/2021    Influenza (intradermal) 11/28/2011    Pneumo Conj 13-V (2010&after) 01/11/2018    Pneumococcal 23 valent 10/04/2018    TD,PF 7+ (Tenivac) 08/01/2005    TDAP (Adacel,Boostrix) 06/09/2022    TDAP Vaccine (Adacel) 04/18/2012          Chart documentation done in part with Dragon Voice recognition Software. Although reviewed after completion, some word and grammatical error may remain.    Finesse Saunders MD

## (undated) DEVICE — PREP CHLORAPREP 26ML TINTED ORANGE  260815

## (undated) DEVICE — LIFTER SURGICAL ASCENDO SUBMUCOSAL LIFT AGENT BX00712934

## (undated) DEVICE — KIT ENDO FIRST STEP DISINFECTANT 200ML W/POUCH EP-4

## (undated) DEVICE — PAD CHUX UNDERPAD 23X24" 7136

## (undated) RX ORDER — FENTANYL CITRATE 50 UG/ML
INJECTION, SOLUTION INTRAMUSCULAR; INTRAVENOUS
Status: DISPENSED
Start: 2025-07-08

## (undated) RX ORDER — FENTANYL CITRATE 50 UG/ML
INJECTION, SOLUTION INTRAMUSCULAR; INTRAVENOUS
Status: DISPENSED
Start: 2023-05-12